# Patient Record
Sex: FEMALE | Race: WHITE | NOT HISPANIC OR LATINO | Employment: OTHER | URBAN - METROPOLITAN AREA
[De-identification: names, ages, dates, MRNs, and addresses within clinical notes are randomized per-mention and may not be internally consistent; named-entity substitution may affect disease eponyms.]

---

## 2017-09-26 ENCOUNTER — RECORDS - HEALTHEAST (OUTPATIENT)
Dept: LAB | Facility: CLINIC | Age: 37
End: 2017-09-26

## 2017-09-26 LAB — HIV 1+2 AB+HIV1 P24 AG SERPL QL IA: NEGATIVE

## 2018-11-05 ENCOUNTER — RECORDS - HEALTHEAST (OUTPATIENT)
Dept: LAB | Facility: CLINIC | Age: 38
End: 2018-11-05

## 2018-11-05 LAB — HIV 1+2 AB+HIV1 P24 AG SERPL QL IA: NEGATIVE

## 2018-11-06 LAB
HPV SOURCE: ABNORMAL
HUMAN PAPILLOMA VIRUS 16 DNA: NEGATIVE
HUMAN PAPILLOMA VIRUS 18 DNA: NEGATIVE
HUMAN PAPILLOMA VIRUS FINAL DIAGNOSIS: ABNORMAL
HUMAN PAPILLOMA VIRUS OTHER HR: POSITIVE
SPECIMEN DESCRIPTION: ABNORMAL

## 2018-11-14 LAB
BKR LAB AP ABNORMAL BLEEDING: NO
BKR LAB AP BIRTH CONTROL/HORMONES: NORMAL
BKR LAB AP CERVICAL APPEARANCE: NORMAL
BKR LAB AP GYN ADEQUACY: NORMAL
BKR LAB AP GYN INTERPRETATION: NORMAL
BKR LAB AP HPV REFLEX: NORMAL
BKR LAB AP LMP: NORMAL
BKR LAB AP PATIENT STATUS: NORMAL
BKR LAB AP PREVIOUS ABNORMAL: NORMAL
BKR LAB AP PREVIOUS NORMAL: 2015
HIGH RISK?: NO
PATH REPORT.COMMENTS IMP SPEC: NORMAL
RESULT FLAG (HE HISTORICAL CONVERSION): NORMAL

## 2019-11-27 LAB
HPV SOURCE: NORMAL
HUMAN PAPILLOMA VIRUS 16 DNA: NEGATIVE
HUMAN PAPILLOMA VIRUS 18 DNA: NEGATIVE
HUMAN PAPILLOMA VIRUS FINAL DIAGNOSIS: NORMAL
HUMAN PAPILLOMA VIRUS OTHER HR: NEGATIVE
SPECIMEN DESCRIPTION: NORMAL

## 2019-12-04 ENCOUNTER — RECORDS - HEALTHEAST (OUTPATIENT)
Dept: ADMINISTRATIVE | Facility: OTHER | Age: 39
End: 2019-12-04

## 2019-12-04 LAB
BKR LAB AP ABNORMAL BLEEDING: NO
BKR LAB AP BIRTH CONTROL/HORMONES: NORMAL
BKR LAB AP CERVICAL APPEARANCE: NORMAL
BKR LAB AP GYN ADEQUACY: NORMAL
BKR LAB AP GYN INTERPRETATION: NORMAL
BKR LAB AP HPV REFLEX: NORMAL
BKR LAB AP LMP: NORMAL
BKR LAB AP PATIENT STATUS: NO
BKR LAB AP PREVIOUS ABNORMAL: NORMAL
BKR LAB AP PREVIOUS NORMAL: NORMAL
HIGH RISK?: NO
PATH REPORT.COMMENTS IMP SPEC: NORMAL
RESULT FLAG (HE HISTORICAL CONVERSION): NORMAL

## 2020-11-23 ENCOUNTER — RECORDS - HEALTHEAST (OUTPATIENT)
Dept: ADMINISTRATIVE | Facility: OTHER | Age: 40
End: 2020-11-23

## 2020-11-27 ENCOUNTER — RECORDS - HEALTHEAST (OUTPATIENT)
Dept: ADMINISTRATIVE | Facility: OTHER | Age: 40
End: 2020-11-27

## 2020-11-27 ENCOUNTER — RECORDS - HEALTHEAST (OUTPATIENT)
Dept: LAB | Facility: CLINIC | Age: 40
End: 2020-11-27

## 2020-11-27 LAB
ALBUMIN SERPL-MCNC: 4.3 G/DL (ref 3.5–5)
ALP SERPL-CCNC: 56 U/L (ref 45–120)
ALT SERPL W P-5'-P-CCNC: 19 U/L (ref 0–45)
ANION GAP SERPL CALCULATED.3IONS-SCNC: 11 MMOL/L (ref 5–18)
AST SERPL W P-5'-P-CCNC: 16 U/L (ref 0–40)
BILIRUB SERPL-MCNC: 0.4 MG/DL (ref 0–1)
BUN SERPL-MCNC: 21 MG/DL (ref 8–22)
CALCIUM SERPL-MCNC: 8.9 MG/DL (ref 8.5–10.5)
CHLORIDE BLD-SCNC: 105 MMOL/L (ref 98–107)
CHOLEST SERPL-MCNC: 173 MG/DL
CO2 SERPL-SCNC: 23 MMOL/L (ref 22–31)
CREAT SERPL-MCNC: 0.71 MG/DL (ref 0.6–1.1)
FASTING STATUS PATIENT QL REPORTED: NORMAL
GFR SERPL CREATININE-BSD FRML MDRD: >60 ML/MIN/1.73M2
GLUCOSE BLD-MCNC: 101 MG/DL (ref 70–125)
HDLC SERPL-MCNC: 75 MG/DL
HIV 1+2 AB+HIV1 P24 AG SERPL QL IA: NEGATIVE
LDLC SERPL CALC-MCNC: 81 MG/DL
POTASSIUM BLD-SCNC: 3.9 MMOL/L (ref 3.5–5)
PROT SERPL-MCNC: 7.4 G/DL (ref 6–8)
SODIUM SERPL-SCNC: 139 MMOL/L (ref 136–145)
TRIGL SERPL-MCNC: 85 MG/DL

## 2020-11-28 LAB — T PALLIDUM AB SER QL: NEGATIVE

## 2020-12-01 LAB
C TRACH DNA SPEC QL PROBE+SIG AMP: NEGATIVE
N GONORRHOEA DNA SPEC QL NAA+PROBE: NEGATIVE

## 2020-12-03 ENCOUNTER — RECORDS - HEALTHEAST (OUTPATIENT)
Dept: ADMINISTRATIVE | Facility: OTHER | Age: 40
End: 2020-12-03

## 2021-01-29 ENCOUNTER — RECORDS - HEALTHEAST (OUTPATIENT)
Dept: LAB | Facility: CLINIC | Age: 41
End: 2021-01-29

## 2021-01-29 ENCOUNTER — RECORDS - HEALTHEAST (OUTPATIENT)
Dept: ADMINISTRATIVE | Facility: OTHER | Age: 41
End: 2021-01-29

## 2021-01-29 LAB — HIV 1+2 AB+HIV1 P24 AG SERPL QL IA: NEGATIVE

## 2021-01-30 LAB — T PALLIDUM AB SER QL: NEGATIVE

## 2021-02-02 LAB
C TRACH DNA SPEC QL PROBE+SIG AMP: NEGATIVE
N GONORRHOEA DNA SPEC QL NAA+PROBE: NEGATIVE

## 2021-05-25 ENCOUNTER — RECORDS - HEALTHEAST (OUTPATIENT)
Dept: RADIOLOGY | Facility: CLINIC | Age: 41
End: 2021-05-25

## 2021-05-25 ENCOUNTER — RECORDS - HEALTHEAST (OUTPATIENT)
Dept: ADMINISTRATIVE | Facility: OTHER | Age: 41
End: 2021-05-25

## 2021-05-25 ENCOUNTER — AMBULATORY - HEALTHEAST (OUTPATIENT)
Dept: ADMINISTRATIVE | Facility: CLINIC | Age: 41
End: 2021-05-25
Payer: COMMERCIAL

## 2021-05-25 DIAGNOSIS — C50.919 INFILTRATING DUCT CARCINOMA (H): ICD-10-CM

## 2021-05-28 ENCOUNTER — HOSPITAL ENCOUNTER (OUTPATIENT)
Dept: SURGERY | Facility: CLINIC | Age: 41
Discharge: HOME | End: 2021-05-28
Attending: SPECIALIST
Payer: COMMERCIAL

## 2021-05-28 ENCOUNTER — AMBULATORY - HEALTHEAST (OUTPATIENT)
Dept: SURGERY | Facility: AMBULATORY SURGERY CENTER | Age: 41
End: 2021-05-28

## 2021-05-28 DIAGNOSIS — Z11.59 ENCOUNTER FOR SCREENING FOR OTHER VIRAL DISEASES: ICD-10-CM

## 2021-05-28 DIAGNOSIS — F12.11 MILD CANNABIS ABUSE IN SUSTAINED REMISSION IN CONTROLLED ENVIRONMENT: ICD-10-CM

## 2021-05-28 DIAGNOSIS — D22.5 PIGMENTED HAIRY EPIDERMAL NEVUS: ICD-10-CM

## 2021-05-28 DIAGNOSIS — C50.112 MALIGNANT NEOPLASM OF CENTRAL PORTION OF LEFT BREAST IN FEMALE, ESTROGEN RECEPTOR POSITIVE (H): ICD-10-CM

## 2021-05-28 DIAGNOSIS — Z17.0 MALIGNANT NEOPLASM OF CENTRAL PORTION OF LEFT BREAST IN FEMALE, ESTROGEN RECEPTOR POSITIVE (H): ICD-10-CM

## 2021-05-28 ASSESSMENT — MIFFLIN-ST. JEOR: SCORE: 1359.88

## 2021-06-01 ENCOUNTER — COMMUNICATION - HEALTHEAST (OUTPATIENT)
Dept: SURGERY | Facility: CLINIC | Age: 41
End: 2021-06-01

## 2021-06-07 ENCOUNTER — COMMUNICATION - HEALTHEAST (OUTPATIENT)
Dept: TELEHEALTH | Facility: CLINIC | Age: 41
End: 2021-06-07

## 2021-06-07 ENCOUNTER — AMBULATORY - HEALTHEAST (OUTPATIENT)
Dept: LAB | Facility: CLINIC | Age: 41
End: 2021-06-07

## 2021-06-07 DIAGNOSIS — Z11.59 ENCOUNTER FOR SCREENING FOR OTHER VIRAL DISEASES: ICD-10-CM

## 2021-06-07 LAB
SARS-COV-2 PCR COMMENT: NORMAL
SARS-COV-2 RNA SPEC QL NAA+PROBE: NEGATIVE
SARS-COV-2 VIRUS SPECIMEN SOURCE: NORMAL

## 2021-06-08 ENCOUNTER — ANESTHESIA - HEALTHEAST (OUTPATIENT)
Dept: SURGERY | Facility: AMBULATORY SURGERY CENTER | Age: 41
End: 2021-06-08

## 2021-06-08 ENCOUNTER — COMMUNICATION - HEALTHEAST (OUTPATIENT)
Dept: SCHEDULING | Facility: CLINIC | Age: 41
End: 2021-06-08

## 2021-06-08 ENCOUNTER — RECORDS - HEALTHEAST (OUTPATIENT)
Dept: ADMINISTRATIVE | Facility: OTHER | Age: 41
End: 2021-06-08

## 2021-06-08 ASSESSMENT — MIFFLIN-ST. JEOR: SCORE: 1359.42

## 2021-06-09 ENCOUNTER — RECORDS - HEALTHEAST (OUTPATIENT)
Dept: ADMINISTRATIVE | Facility: OTHER | Age: 41
End: 2021-06-09

## 2021-06-09 ENCOUNTER — HOSPITAL ENCOUNTER (OUTPATIENT)
Dept: MAMMOGRAPHY | Facility: CLINIC | Age: 41
Discharge: HOME | End: 2021-06-09
Attending: SPECIALIST
Payer: COMMERCIAL

## 2021-06-09 ENCOUNTER — SURGERY - HEALTHEAST (OUTPATIENT)
Dept: SURGERY | Facility: AMBULATORY SURGERY CENTER | Age: 41
End: 2021-06-09
Payer: COMMERCIAL

## 2021-06-09 ENCOUNTER — HOSPITAL ENCOUNTER (OUTPATIENT)
Dept: NUCLEAR MEDICINE | Facility: HOSPITAL | Age: 41
Discharge: HOME | End: 2021-06-09
Attending: SPECIALIST
Payer: COMMERCIAL

## 2021-06-09 DIAGNOSIS — C50.112 MALIGNANT NEOPLASM OF CENTRAL PORTION OF LEFT BREAST IN FEMALE, ESTROGEN RECEPTOR POSITIVE (H): ICD-10-CM

## 2021-06-09 DIAGNOSIS — Z17.0 MALIGNANT NEOPLASM OF CENTRAL PORTION OF LEFT BREAST IN FEMALE, ESTROGEN RECEPTOR POSITIVE (H): ICD-10-CM

## 2021-06-09 ASSESSMENT — MIFFLIN-ST. JEOR: SCORE: 1359.42

## 2021-06-10 ENCOUNTER — OFFICE VISIT - HEALTHEAST (OUTPATIENT)
Dept: ONCOLOGY | Facility: CLINIC | Age: 41
End: 2021-06-10

## 2021-06-10 ENCOUNTER — RECORDS - HEALTHEAST (OUTPATIENT)
Dept: ADMINISTRATIVE | Facility: OTHER | Age: 41
End: 2021-06-10

## 2021-06-10 DIAGNOSIS — Z80.0 FAMILY HISTORY OF PANCREATIC CANCER: ICD-10-CM

## 2021-06-10 DIAGNOSIS — Z80.3 FAMILY HISTORY OF MALIGNANT NEOPLASM OF BREAST: ICD-10-CM

## 2021-06-10 DIAGNOSIS — Z17.0 MALIGNANT NEOPLASM OF CENTRAL PORTION OF LEFT BREAST IN FEMALE, ESTROGEN RECEPTOR POSITIVE (H): ICD-10-CM

## 2021-06-10 DIAGNOSIS — C50.112 MALIGNANT NEOPLASM OF CENTRAL PORTION OF LEFT BREAST IN FEMALE, ESTROGEN RECEPTOR POSITIVE (H): ICD-10-CM

## 2021-06-10 DIAGNOSIS — Z71.83 ENCOUNTER FOR NONPROCREATIVE GENETIC COUNSELING: ICD-10-CM

## 2021-06-10 DIAGNOSIS — Z80.0 FAMILY HISTORY OF COLON CANCER: ICD-10-CM

## 2021-06-11 ENCOUNTER — AMBULATORY - HEALTHEAST (OUTPATIENT)
Dept: SURGERY | Facility: CLINIC | Age: 41
End: 2021-06-11

## 2021-06-11 ENCOUNTER — COMMUNICATION - HEALTHEAST (OUTPATIENT)
Dept: ADMINISTRATIVE | Facility: HOSPITAL | Age: 41
End: 2021-06-11

## 2021-06-14 ENCOUNTER — SURGERY - HEALTHEAST (OUTPATIENT)
Dept: SURGERY | Facility: AMBULATORY SURGERY CENTER | Age: 41
End: 2021-06-14
Payer: COMMERCIAL

## 2021-06-14 ENCOUNTER — ANESTHESIA - HEALTHEAST (OUTPATIENT)
Dept: SURGERY | Facility: AMBULATORY SURGERY CENTER | Age: 41
End: 2021-06-14

## 2021-06-14 ENCOUNTER — RECORDS - HEALTHEAST (OUTPATIENT)
Dept: ADMINISTRATIVE | Facility: OTHER | Age: 41
End: 2021-06-14

## 2021-06-14 ENCOUNTER — HOSPITAL ENCOUNTER (EMERGENCY)
Dept: EMERGENCY MEDICINE | Facility: CLINIC | Age: 41
Discharge: HOME | End: 2021-06-14
Attending: EMERGENCY MEDICINE
Payer: COMMERCIAL

## 2021-06-14 ENCOUNTER — AMBULATORY - HEALTHEAST (OUTPATIENT)
Dept: INFUSION THERAPY | Facility: CLINIC | Age: 41
End: 2021-06-14

## 2021-06-14 ENCOUNTER — COMMUNICATION - HEALTHEAST (OUTPATIENT)
Dept: SURGERY | Facility: CLINIC | Age: 41
End: 2021-06-14

## 2021-06-14 ENCOUNTER — HOSPITAL ENCOUNTER (OUTPATIENT)
Dept: SURGERY | Facility: CLINIC | Age: 41
Discharge: HOME | End: 2021-06-14
Attending: SPECIALIST
Payer: COMMERCIAL

## 2021-06-14 DIAGNOSIS — C50.112 MALIGNANT NEOPLASM OF CENTRAL PORTION OF LEFT BREAST IN FEMALE, ESTROGEN RECEPTOR POSITIVE (H): ICD-10-CM

## 2021-06-14 DIAGNOSIS — Z17.0 MALIGNANT NEOPLASM OF CENTRAL PORTION OF LEFT BREAST IN FEMALE, ESTROGEN RECEPTOR POSITIVE (H): ICD-10-CM

## 2021-06-14 DIAGNOSIS — M96.840 POSTOPERATIVE HEMATOMA OF MUSCULOSKELETAL STRUCTURE FOLLOWING MUSCULOSKELETAL PROCEDURE: ICD-10-CM

## 2021-06-14 LAB
ATRIAL RATE - MUSE: 83 BPM
DIASTOLIC BLOOD PRESSURE - MUSE: NORMAL
ERYTHROCYTE [DISTWIDTH] IN BLOOD BY AUTOMATED COUNT: 12.4 % (ref 11–14.5)
HCT VFR BLD AUTO: 39.5 % (ref 35–47)
HGB BLD-MCNC: 13.4 G/DL (ref 12–16)
INTERPRETATION ECG - MUSE: NORMAL
MCH RBC QN AUTO: 30.7 PG (ref 27–34)
MCHC RBC AUTO-ENTMCNC: 33.9 G/DL (ref 32–36)
MCV RBC AUTO: 91 FL (ref 80–100)
P AXIS - MUSE: 48 DEGREES
PLATELET # BLD AUTO: 244 THOU/UL (ref 140–440)
PMV BLD AUTO: 10 FL (ref 8.5–12.5)
PR INTERVAL - MUSE: 128 MS
QRS DURATION - MUSE: 84 MS
QT - MUSE: 388 MS
QTC - MUSE: 455 MS
R AXIS - MUSE: 59 DEGREES
RBC # BLD AUTO: 4.36 MILL/UL (ref 3.8–5.4)
SYSTOLIC BLOOD PRESSURE - MUSE: NORMAL
T AXIS - MUSE: 27 DEGREES
VENTRICULAR RATE- MUSE: 83 BPM
WBC: 4.1 THOU/UL (ref 4–11)

## 2021-06-14 ASSESSMENT — MIFFLIN-ST. JEOR
SCORE: 1364.42
SCORE: 1359.42

## 2021-06-17 ENCOUNTER — AMBULATORY - HEALTHEAST (OUTPATIENT)
Dept: INFUSION THERAPY | Facility: CLINIC | Age: 41
End: 2021-06-17

## 2021-06-17 ENCOUNTER — TRANSFERRED RECORDS (OUTPATIENT)
Dept: HEALTH INFORMATION MANAGEMENT | Facility: CLINIC | Age: 41
End: 2021-06-17

## 2021-06-17 ENCOUNTER — COMMUNICATION - HEALTHEAST (OUTPATIENT)
Dept: SURGERY | Facility: CLINIC | Age: 41
End: 2021-06-17
Payer: COMMERCIAL

## 2021-06-17 ENCOUNTER — HOSPITAL ENCOUNTER (OUTPATIENT)
Dept: SURGERY | Facility: CLINIC | Age: 41
Discharge: HOME | End: 2021-06-17
Attending: SPECIALIST
Payer: COMMERCIAL

## 2021-06-17 DIAGNOSIS — Z80.0 FAMILY HISTORY OF PANCREATIC CANCER: ICD-10-CM

## 2021-06-17 DIAGNOSIS — Z80.0 FAMILY HISTORY OF COLON CANCER: ICD-10-CM

## 2021-06-17 DIAGNOSIS — Z48.89 POSTOPERATIVE VISIT: ICD-10-CM

## 2021-06-17 DIAGNOSIS — C50.112 MALIGNANT NEOPLASM OF CENTRAL PORTION OF LEFT BREAST IN FEMALE, ESTROGEN RECEPTOR POSITIVE (H): ICD-10-CM

## 2021-06-17 DIAGNOSIS — Z17.0 MALIGNANT NEOPLASM OF CENTRAL PORTION OF LEFT BREAST IN FEMALE, ESTROGEN RECEPTOR POSITIVE (H): ICD-10-CM

## 2021-06-17 DIAGNOSIS — Z80.3 FAMILY HISTORY OF MALIGNANT NEOPLASM OF BREAST: ICD-10-CM

## 2021-06-18 ENCOUNTER — COMMUNICATION - HEALTHEAST (OUTPATIENT)
Dept: ONCOLOGY | Facility: HOSPITAL | Age: 41
End: 2021-06-18

## 2021-06-19 ENCOUNTER — COMMUNICATION - HEALTHEAST (OUTPATIENT)
Dept: SURGERY | Facility: CLINIC | Age: 41
End: 2021-06-19
Payer: COMMERCIAL

## 2021-06-21 ENCOUNTER — COMMUNICATION - HEALTHEAST (OUTPATIENT)
Dept: SURGERY | Facility: CLINIC | Age: 41
End: 2021-06-21
Payer: COMMERCIAL

## 2021-06-21 ENCOUNTER — RECORDS - HEALTHEAST (OUTPATIENT)
Dept: ADMINISTRATIVE | Facility: OTHER | Age: 41
End: 2021-06-21

## 2021-06-22 ENCOUNTER — COMMUNICATION - HEALTHEAST (OUTPATIENT)
Dept: SURGERY | Facility: CLINIC | Age: 41
End: 2021-06-22
Payer: COMMERCIAL

## 2021-06-24 ENCOUNTER — COMMUNICATION - HEALTHEAST (OUTPATIENT)
Dept: SURGERY | Facility: CLINIC | Age: 41
End: 2021-06-24

## 2021-06-25 NOTE — TELEPHONE ENCOUNTER
Patient returned call to schedule genetics labs.  Scheduled on 6/14/21 at 9:15 at [facility].

## 2021-06-25 NOTE — PROGRESS NOTES
"Antonella presents to [facility] today for a surgical consult with Dr. Ramirez  regarding her newly diagnosed left breast cancer.  She is accompanied by her sister in law for consult.  Patient states she has a 7 year old daughter.  Talked with her about resources for young survivors as well as resources for her daughter.  RN assessment and EMR update. Resp 16   Ht 5' 9\" (1.753 m)   Wt 139 lb (63 kg)   Breastfeeding No   BMI 20.53 kg/m    Patient given a Breast Cancer Packet, contents reviewed.  She met with Dr. Ramirez  see dictation for details of visit. She will plan wire loc lumpectomy with sentinel node biopsy .  Pre and post op teaching complete.   Lili to call her for surgery scheduling.  Support provided, invited calls.  RN time 22 mins.  "

## 2021-06-25 NOTE — TELEPHONE ENCOUNTER
Patient called, has a history of a hairy nevus as a child that was removed from her check when she was little.  I added this to her medical history list and will update Dr. Ramirez.

## 2021-06-25 NOTE — PROGRESS NOTES
"This is a 40 y.o.  female who I'm asked to see by Rosemary Mata MD for evaluation of a left breast cancer.  This was picked up  on screening mammogram.  The patient cannot feel a mass.  A needle biopsy was done which shows an invasive ductal carcinoma.  It is estrogen receptor positive, progesterone receptor positive and HER-2 negative.    She has a significant family history and that her aunt had breast cancer and her mother  of pancreatic cancer.      PAST MEDICAL HISTORY:  Patient is otherwise healthy.  Medications:  None    Allergies:  Allergies   Allergen Reactions     Cat Dander      Dog Dander        Social History:  Patient does not smoke        Physical Exam  Resp 16   Ht 5' 9\" (1.753 m)   Wt 139 lb (63 kg)   Breastfeeding No   BMI 20.53 kg/m    General:alert, appears stated age and cooperative  Lungs:clear to auscultation bilaterally  CV:regular rate and rhythm, S1, S2 normal, no murmur, click, rub or gallop  Breasts: Small palpable mass in the somewhat superior lateral but central left breast.  Measures no more than about 1 to 1-1/2 cm in size.  No skin changes.  Lymph Nodes:no axillary nodes palpated  Neuro:Grossly normal  Musculoskeletal: Normal range of motion of her upper extremities.  Skin: Normal skin turgor.    Reviewed her mammograms and ultrasound and pathology.     Impression: Left Breast Cancer. Clinically T1, N0.  Discussed the surgical options for treatment of breast cancer which generally are a lumpectomy (partial mastectomy) combined with radiation versus a mastectomy.  Explained that the survival benefit is the same for both.  The difference is in local recurrence risk.  The patient is a Excellent candidate for a lumpectomy.  The only thing that gives me any pause at all is her family history.  However, this is a low-grade tumor so it does not fit that this would be a genetic type cancer.  I think she should get genetic testing but I think we can proceed with a  lumpectomy if " that is what she prefers to do.  Discussed SLN biopsy.  The procedure and rationale were explained.  Discussed that at this point we do not know yet whether or not she will need chemotherapy and we may not know until we get all of the results of surgery back.      Plan: We'll schedule for a left  lumpectomy with sentinel lymph node biopsy.  This is typically an outpatient procedure under local MAC anesthetic.  The risks and benefits of surgery were explained.  Also talked about expected recovery time.  I have also put in a referral to genetic counseling.        55 minutes spent on the date of the encounter doing chart review, history and exam, documentation and further activities per the note

## 2021-06-25 NOTE — ED TRIAGE NOTES
Left breast lumpectomy last Wednesday.  Here at cancer [facility] for a blood draw today and began having left chest pain and swelling at incision site. Denies nausea, vomiting or shortness of breath. Did have a headache this am and took ibuprofen this morning. GCS 15

## 2021-06-25 NOTE — TELEPHONE ENCOUNTER
Antonella called, was in the ER this am after developing a sudden onset hematoma.  Said she was just waiting to have her blood drawn for genetic testing and she suddenly felt increased pain, swelling and a lump forming in her breast.  She was stable in the ER and is now home. States she is laying down. Feels that her hematoma is stable, no increased pain or swelling for now. Was given Percocet in the ER and that is helping. Said the size of the hematoma is larger than a plum but smaller than an orange.   Told her to keep pressure and an ice pack on her breast and I will connect with Dr. Ramirez for a plan.      Called patient to tell her Dr. Ramirez wanted to  her appointment up to tomorrow, but patient states she is with her nurse friend and her hematoma seems to be increasing again. She is getting swelling up towards her clavicle.  Dr. Ramirez notified again. Wants patient to come to office for eval right now.  Patient said her friend can drive her.  Once again, told her to keep pressure over the breast and use her icepack.

## 2021-06-26 ENCOUNTER — HEALTH MAINTENANCE LETTER (OUTPATIENT)
Age: 41
End: 2021-06-26

## 2021-06-26 NOTE — ANESTHESIA CARE TRANSFER NOTE
Last vitals:   Vitals:    06/14/21 1521   BP: 105/65   Pulse: 85   Resp: 16   Temp: 36.2  C (97.2  F)   SpO2: 98%     Pt brought to phase 2 on room air. Monitors applied. VSS.    Patient's level of consciousness is drowsy  Spontaneous respirations: yes  Maintains airway independently: yes  Dentition unchanged: yes  Oropharynx: oropharynx clear of all foreign objects    QCDR Measures:  ASA# 20 - Surgical Safety Checklist: WHO surgical safety checklist completed prior to induction    PQRS# 430 - Adult PONV Prevention: 4558F - Pt received => 2 anti-emetic agents (different classes) preop & intraop  ASA# 8 - Peds PONV Prevention: NA - Not pediatric patient, not GA or 2 or more risk factors NOT present  PQRS# 424 - Florecita-op Temp Management: NA - MAC anesthesia or case < 60 minutes  PQRS# 426 - PACU Transfer Protocol: - Transfer of care checklist used  ASA# 14 - Acute Post-op Pain: ASA14B - Patient did NOT experience pain >= 7 out of 10

## 2021-06-26 NOTE — PROGRESS NOTES
Antonella presents to [facility] for a post op appointment with Dr. Ramirez .  She is accompanied by her friend for appointment.  She states she is doing well, minimal pain.  She has good ROM, reviewed ROM exercises with her.  Patient met with Dr. Ramirez .  See dictation for details of visit.  Oncotype has been ordered and results are pending.  She will plan to be referred onto Medical Oncology and Radiation Oncology and will plan to follow up with Dr. Ramirez  in one year with bilateral mammograms.  Invited calls sooner if she has any questions. Support provided.  RN time 15 mins.

## 2021-06-26 NOTE — ANESTHESIA PREPROCEDURE EVALUATION
Anesthesia Evaluation      Patient summary reviewed     Airway   Mallampati: II  Neck ROM: full   Pulmonary - negative ROS and normal exam                          Cardiovascular - negative ROS and normal exam   Neuro/Psych - negative ROS     Endo/Other - negative ROS      GI/Hepatic/Renal            Dental - normal exam                          Anesthesia Plan  Planned anesthetic: MAC    ASA 1   Induction: intravenous   Anesthetic plan and risks discussed with: patient    Post-op plan: routine recovery

## 2021-06-26 NOTE — ED PROVIDER NOTES
10:05 AM.  This patient was seen by the physician's assistant, Sabra Aparicio.  She discussed the patient's history and physical examination, plan evaluation, and disposition and possible treatment.  I also saw the patient performed history physical examination myself.  Patient is status post left breast lumpectomy and axillary node dissection.  She notes increased swelling in the area of the breast incision consistent with possible swelling or hematoma.  This has the appearance of a possible hematoma near.  We will check a CBC and chest x-ray to start with.  Patient may need further evaluation after discussion with the surgeon, Dr. Sigrid Ramirez.     Sohail Huggins MD  06/14/21 8848

## 2021-06-26 NOTE — ED PROVIDER NOTES
"EMERGENCY DEPARTMENT ENCOUNTER      NAME: Antonella Jamison  AGE: 40 y.o. female  YOB: 1980  MRN: [id number]  EVALUATION DATE & TIME: 6/14/2021  9:41 AM    PCP: Rosemary Mata MD    ED PROVIDER: Sabra Aparicio PA-C      Chief Complaint   Patient presents with     Chest Pain         FINAL IMPRESSION:  1. Postoperative hematoma of musculoskeletal structure following musculoskeletal procedure          MEDICAL DECISION MAKING:    Pertinent Labs & Imaging studies reviewed. (See chart for details)  40 y.o. female with a h/o ER+ br CA with recent left breast lumpectomy and sentinel node biopsy 5 days ago by Dr. Ramirez presents to the Emergency Department for evaluation of left chest pain with area of swelling     On exam she is alert, uncomfortable appearing but no acute distress.  Vital signs are WNL.  The area of swelling overlies her surgical incision which is tender to palpation and fluctuant no surrounding erythema, warmth. Diameter of swelling measures approximately 4 cm across. The surgical incision is well-appearing without any active drainage.    Area appears to be postsurgical hematoma, as there is no sign of infection to suggest cutaneous abscess or surgical site compromise. Patient feels increased pressure with leaning forward, and notes that the area seems that it is increasing over the last couple hours.  CBC does not reveal anemia or leukocytosis. CXR without effusion, infiltrates or pneumothorax.    Dr. Haney team at the breast center was contacted, I spoke to Dr. Cain who recommends pain control and follow-up in the breast clinic as scheduled in 3 days for consideration of hematoma drainage.  Recommended she refrain from leaning forward or any motion that exacerbates the pain.  We had a long discussion regarding the nature of the swelling, and reassured her that it is not dangerous and that there is nothing to \"pop\" as was her fear.      She is concerned that she will be in " significant pain for 3 days until her follow-up in the breast clinic, I encouraged her to call their triage line for possibility of being seen sooner.  I offered to provide more oral pain medication, patient received never took her Vicodin after her recent procedure and she does have all of this at home.  I did give a Percocet during her ED course which helped to relieve some of the pain.    There is no evidence of acute or emergent process requiring intervention at this time. Pt is appropriate for outpatient management. Provisional nature of today's diagnosis was discussed and strict return precautions were given. Pt expressed understanding and She was discharged to home in good condition.     0 minutes of critical care time     ED COURSE  10:00 AM  Met and evaluated patient. Discussed ED plan.   10:24 AM  Staffed the patient with Dr. Huggins  10:28 AM Spoke with Dr. Key  11:00 AM Updated the patient.  11:20 AM discharged to home in good condition by RN.      MEDICATIONS GIVEN IN THE EMERGENCY:  Medications   oxyCODONE-acetaminophen 5-325 mg 1 tablet (PERCOCET/ENDOCET) (1 tablet Oral Given 6/14/21 1055)       NEW PRESCRIPTIONS STARTED AT TODAY'S ER VISIT  Discharge Medication List as of 6/14/2021 11:20 AM             =================================================================    HPI    Patient information was obtained from: Patient    Use of Intrepreter: N/A         Antonella Jamison is a 40 y.o. female with past medical history of breast cancer, who presents to the emergency department for evaluation of left sided breast pain.    Per chart review, patient completed Left Lumpectomy (With MSC u/s): [place] Lymph Node Biopsy surgery last Wednesday (6/9/21) with Dr. Ramirez at [facility].     Patient reports her left breast has felt fine his her surgery last Wednesday, but beginning this morning she felt sudden, constant pain near her incision site and removed the tape. Upon removing tape, she reports  "she felt a \"hard ball forming.\" Prior to sudden onset of pain, patient reports normal breast tissue on both breasts. She reports 30 minutes prior to ED arrival, \"ball\" in her left breast felt like it was increasing in size while she was in the waiting room at the cancer center so she came to the ED.     Patient reports taking ibuprofen for pain this morning for her headache, which she mentions is common for her. She denies nausea. Vomiting, abdominal pain, fever, and any other recent illness. She has no other complaints at this time.    REVIEW OF SYSTEMS   Review of Systems   Constitutional: Negative for activity change, fatigue, fever and unexpected weight change.   HENT: Negative for congestion, sore throat and voice change.    Eyes: Negative for visual disturbance.   Respiratory: Negative for cough, chest tightness, shortness of breath and wheezing.    Cardiovascular: Positive for chest pain (left breast). Negative for palpitations and leg swelling.   Gastrointestinal: Negative for abdominal pain, blood in stool, constipation, diarrhea, nausea and vomiting.   Genitourinary: Negative for difficulty urinating, dysuria and hematuria.   Musculoskeletal: Negative for arthralgias and joint swelling.   Skin: Negative for pallor and rash.   Neurological: Negative for dizziness, tremors, weakness, light-headedness and headaches.   Hematological: Negative for adenopathy.   Psychiatric/Behavioral: Negative for agitation and confusion. The patient is not nervous/anxious.    All other systems reviewed and are negative.        PAST MEDICAL HISTORY:  Past Medical History:   Diagnosis Date     Cancer (H)      Infectious viral hepatitis        PAST SURGICAL HISTORY:  Past Surgical History:   Procedure Laterality Date     Hairy nevis       TX MASTECTOMY, PARTIAL Left 6/9/2021    Procedure: Left Lumpectomy (With MSC u/s): [place] Lymph Node Biopsy;  Surgeon: Sigrid Ramirez MD;  Location: [facility];  Service: General     " US SENTINEL NODE INJECTION  2021     VAGINAL DELIVERY       WISDOM TOOTH EXTRACTION             CURRENT MEDICATIONS:    No current facility-administered medications on file prior to encounter.      Current Outpatient Medications on File Prior to Encounter   Medication Sig     HYDROcodone-acetaminophen 5-325 mg per tablet Take 1 tablet by mouth every 4 (four) hours as needed for pain.     loratadine (CLARITIN) 10 mg tablet Take 10 mg by mouth.       ALLERGIES:  Allergies   Allergen Reactions     Cat Dander      Dog Dander        FAMILY HISTORY:  History reviewed. No pertinent family history.    SOCIAL HISTORY:   Social History     Socioeconomic History     Marital status:      Spouse name: Not on file     Number of children: Not on file     Years of education: Not on file     Highest education level: Not on file   Occupational History     Not on file   Social Needs     Financial resource strain: Not on file     Food insecurity     Worry: Not on file     Inability: Not on file     Transportation needs     Medical: Not on file     Non-medical: Not on file   Tobacco Use     Smoking status: Former Smoker     Quit date:      Years since quittin.4     Smokeless tobacco: Never Used   Substance and Sexual Activity     Alcohol use: Yes     Comment: 5 X week     Drug use: Never     Sexual activity: Not on file   Lifestyle     Physical activity     Days per week: Not on file     Minutes per session: Not on file     Stress: Not on file   Relationships     Social connections     Talks on phone: Not on file     Gets together: Not on file     Attends Nondenominational service: Not on file     Active member of club or organization: Not on file     Attends meetings of clubs or organizations: Not on file     Relationship status: Not on file     Intimate partner violence     Fear of current or ex partner: Not on file     Emotionally abused: Not on file     Physically abused: Not on file     Forced sexual activity: Not on  "file   Other Topics Concern     Not on file   Social History Narrative     Not on file         VITALS:  Patient Vitals for the past 24 hrs:   BP Temp Temp src Pulse Resp SpO2 Height Weight   06/14/21 0948 131/64 98.5  F (36.9  C) Oral 84 20 97 % 5' 9\" (1.753 m) 140 lb (63.5 kg)       PHYSICAL EXAM      Physical Exam   Constitutional: She is oriented to person, place, and time. She appears well-developed and well-nourished. No distress.   Uncomfortable appearing. Thin.    HENT:   Head: Normocephalic and atraumatic.   Eyes: Conjunctivae are normal. No scleral icterus.   Neck: Normal range of motion.   Cardiovascular: Normal rhythm.   No murmur heard.  Pulmonary/Chest: Effort normal and breath sounds normal. No respiratory distress. She has no wheezes.   Abdominal: Soft. She exhibits no distension. There is no abdominal tenderness. There is no rebound.   Musculoskeletal: Normal range of motion.         General: No tenderness, deformity or edema.      Comments: Upper outer quadrant of left breast area of swelling, induration about surgical incident.    Neurological: She is alert and oriented to person, place, and time. No cranial nerve deficit.   Skin: Skin is warm and dry. No rash noted. She is not diaphoretic. No erythema.   Psychiatric: She has a normal mood and affect. Her behavior is normal.   Vitals reviewed.      LAB:  All pertinent labs reviewed and interpreted.    Labs Reviewed   HM2(CBC W/O DIFFERENTIAL) - Normal       Result Value    WBC 4.1      RBC 4.36      Hemoglobin 13.4      Hematocrit 39.5      MCV 91      MCH 30.7      MCHC 33.9      RDW 12.4      Platelets 244      MPV 10.0     RAINBOW DRAW    Narrative:     The following orders were created for panel order [facility] Draw.  Procedure                               Abnormality         Status                     ---------                               -----------         ------                     Light Green Top[[id number]]                                 "  Final result                 Please view results for these tests on the individual orders.   GREEN TOP(LI HEP 4ML)         RADIOLOGY:  Reviewed all pertinent imaging. Please see official radiology report    XR Chest 2 Views   Final Result   Negative chest.             EKG:    Performed at: 09:33  Impression: normal sinus  Dr. Juan Huggins and I have independently reviewed and interpreted the EKG(s) documented above.    PROCEDURES:   None      I, Rachelle Bset, am serving as a scribe to document services personally performed by Sabra Aparicio PA-C based on my observation and the provider's statements to me. I, Sabra Aparicio PA-C attest that Rachelle Best is acting in a scribe capacity, has observed my performance of the services and has documented them in accordance with my direction.      Sabra Aparicio PA-C  Emergency Medicine  [facility] EMERGENCY ROOM  [address] [place] [address]  Dept: [phone]  Loc: [phone]       Sabra Aparicio PA-C  06/15/21 0758

## 2021-06-26 NOTE — PROGRESS NOTES
"6/10/2021     Antonella Jamison is a 40 y.o. female who is being evaluated via a billable telephone visit.      The patient has been notified of following:     \"This telephone visit will be conducted via a call between you and your physician/provider. We have found that certain health care needs can be provided without the need for a physical exam.  This service lets us provide the care you need with a short phone conversation.  If a prescription is necessary we can send it directly to your pharmacy.  If lab work is needed we can place an order for that and you can then stop by our lab to have the test done at a later time.    Telephone visits are billed at different rates depending on your insurance coverage. During this emergency period, for some insurers they may be billed the same as an in-person visit.  Please reach out to your insurance provider with any questions.    If during the course of the call the physician/provider feels a telephone visit is not appropriate, you will not be charged for this service.\"    Patient has given verbal consent to a Telephone visit? Yes    What phone number would you like to be contacted at? [phone]    Patient would like to receive their AVS by AVS Preference: Mail a copy.    Referring Provider: Sigrid Ramirez MD    Present for Today's Visit: Antonella    Presenting Information:   I met with Antonella Jamison today for genetic counseling (telephone visit due to covid19) to discuss her personal and family history of breast and gastrointestinal cancer.  She is here today to review this history, cancer screening recommendations, and available genetic testing options.    Personal History:  Antonella is a 40 y.o. female. She was recently diagnosed with a left breast cancer picked up on screening mammogram. Biopsy completed 5/19/2021 revealed an invasive ductal carcinoma with associated DCIS; ER positive, DC positive, and HER2 negative. She underwent a left lumpectomy on 6/9/2021 and has " follow-up scheduled with Dr. Harvey scheduled for 6/17/2021 to review final pathology and next steps.     She reports that she has a history of a precancerous hairy nevus removed from her right cheek when she was a child (<10 years).       She had her first menstrual period at age 11 or 12, her first child at age 33, and is premenopausal.  Antonella has her ovaries, fallopian tubes and uterus in place, and she has had no ovarian cancer screening to date.  She reports past history of oral contraceptive use on and off from approximately ages 17 to 31. and that she has never been on hormone replacement therapy.      Her most recent OB-GYN exam and Pap smear in November 2020 were normal. She has not yet had a colonoscopy given her age. She does not regularly do any other cancer screening at this time.  Antonella reported past social tobacco use (in college; quit in 2007), and consuming alcohol about 5 times per week.    Family History: (Please see scanned pedigree for detailed family history information)  Children/Siblings    Daughter, age 7, healthy.    Two brothers, ages 45 and 47, both healthy with no known cancer histories.  Her 45-year-old brother's son, age 13, has a history of precancerous cells removed from his leg at age 7; treated with cream per Antonella.  Maternal    Mother passed at age 55 from pancreatic cancer diagnosed at age 54.  Arely reports that her mother had a hysterectomy but is unsure of that reason or status of the ovaries.    Maternal aunt, in her early 60s, with a history of a breast cancer (IDC) diagnosed in her late 50s; she underwent a unilateral mastectomy and is on hormone therapy.    Maternal aunt, age 73, with a history of skin cancer (unknown type/location) diagnosed in her 60s.    No cancer history reported in either of her maternal grandparents or any of her maternal first cousins.  Paternal    Father, age 73, healthy with no cancer history.    Paternal grandmother passed in her 70s from colon  cancer diagnosed in her 70s.    No cancer history reported in two paternal uncles, one paternal aunt, her paternal grandfather, or any of her paternal first cousins.  She does report one paternal first cousin, in his 30's, with recent history of precancerous colon polyps.      Her maternal ethnicity is Croatian, [place], Estonian, and English. Her paternal ethnicity is Guyanese.  There is no known Ashkenazi Anglican ancestry on either side of her family. There is no reported consanguinity.    Discussion:    Antonella's personal and family history of breast cancer and family history of pancreatic cancer is suggestive of a hereditary cancer syndrome.    We reviewed the features of sporadic, familial, and hereditary cancers. In looking at Antonella's family history, it is possible that a cancer susceptibility gene is present due to her early onset breast cancer diagnosis, her mother's pancreatic cancer history, and her maternal aunt's breast cancer history.    We discussed the natural history and genetics of hereditary cancers. A detailed handout regarding the information we discussed will be sent to Antonella in [place] mail. Topics included: inheritance pattern, cancer risks, cancer screening recommendations, and also risks, benefits and limitations of testing.    We discussed the BRCA1 and BRCA2 genes, which are associated with a condition known as Hereditary Breast and Ovarian Cancer syndrome (HBOC). Women with a mutation in either of these genes are at increased risk for breast and ovarian cancer. There is also an increased risk for a second primary breast cancer for women. Men with a mutation in either BRCA1 or BRCA2 are at increased risk for male breast and prostate cancer. Both women and men may also be at increased risk for pancreatic cancer and melanoma.     Based on her personal and family history, Antonella meets current National Comprehensive Cancer Network (NCCN) criteria for genetic testing of high penetrance breast and/or ovarian  cancer susceptibility genes as well as pancreatic cancer susceptibility genes.      We discussed that there are additional genes that could cause increased risk for breast and gastrointestinal cancers. As many of these genes present with overlapping features in a family and accurate cancer risk cannot always be established based upon the pedigree analysis alone, it would be reasonable for Antonella to consider panel genetic testing to analyze multiple genes at once.    We discussed genetic testing options for Antonella given her personal and family history including genes associated with breast cancer risk ([facility] Breast and Gyn Cancer Panel; 23 genes) and including genes associated with breast and/or gastrointestinal cancer risk  ([facility] Common Hereditary Cancers Panel; 47 genes).  After discussion Arely opted to proceed with genetic testing via the Common Hereditary Cancers panel through [facility].  Genetic testing is available for 47 genes associated with cancers of the breast, ovary, uterus, prostate and gastrointestinal system: [facility] Common Hereditary Cancers panel (APC, TRAVIS, AXIN2, BARD1, BMPR1A, BRCA1, BRCA2, BRIP1, CDH1, CDK4, CDKN2A, CHEK2, CTNNA1, DICER1, EPCAM, GREM1, HOXB13, KIT, MEN1, MLH1, MSH2, MSH3, MSH6, MUTYH, NBN, NF1, NTHL1, PALB2, PDGFRA, PMS2, POLD1, POLE, PTEN,RAD50, RAD51C, RAD51D, SDHA, SDHB, SDHC, SDHD, SMAD4, SMARCA4, STK11, TP53,TSC1, TSC2, VHL).    We discussed that many of these genes are associated with specific hereditary cancer syndromes and published management guidelines: Hereditary Breast and Ovarian Cancer syndrome (BRCA1, BRCA2), Ludwig syndrome (MLH1, MSH2, MSH6, PMS2, EPCAM), Familial Adenomatous Polyposis (APC), Hereditary Diffuse Gastric Cancer (CDH1), Familial Atypical Multiple Mole Melanoma syndrome (CDK4, CDKN2A), Multiple Endocrine Neoplasia type 1 (MEN1), Juvenile Polyposis syndrome (BMPR1A, SMAD4), Cowden syndrome (PTEN), Li Fraumeni syndrome (TP53), Hereditary Paraganglioma and  Pheochromocytoma syndrome (SDHA, SDHB, SDHC, SDHD), Peutz-Jeghers syndrome (STK11), MUTYH Associated Polyposis (MUTYH), Tuberous sclerosis complex (TSC1, TSC2), Von Hippel-Lindau disease (VHL), and Neurofibromatosis type 1 (NF1).   The TRAVIS, AXIN2, BRIP1, CHEK2, GREM1, MSH3, NBN, NTHL1, PALB2, POLD1, POLE, RAD51C, and RAD51D genes are associated with increased cancer risk and have published management guidelines for certain cancers.   The remaining genes (BARD1, CTNNA1, DICER1, HOXB13, KIT, PDGFRA, RAD50, and SMARCA4) are associated with increased cancer risk and may allow us to make medical recommendations when mutations are identified.     Consent was obtained over the phone with no witness required due to the current covid19 global pandemic.    Medical Management: For Antonella, we reviewed that the information from genetic testing may determine:    additional cancer screening for which Antonella may qualify (i.e. mammogram and breast MRI, more frequent colonoscopies, more frequent dermatologic exams, etc.),    options for risk reducing surgeries Antonella could consider (i.e. bilateral mastectomy, surgery to remove her ovaries and/or uterus, etc.),      and targeted chemotherapies for Antonella's active cancer, or if she were to develop certain cancers in the future (i.e. immunotherapy for individuals with Ludwig syndrome, PARP inhibitors, etc.).     These recommendations and possible targeted chemotherapies will be discussed in detail once genetic testing is completed.    I explained that Antonella will be contacted by our scheduling team to set up a lab appointment to get her blood drawn for genetic testing at either [facility] cancer care lab or [facility] cancer care lab at her earliest convenience.   We discussed that Antonella's treatment planning are pending genetic testing results. Follow-up from her lumpectomy is schedule 6/17. For that reason, I will place a STAT request on the genes that may impact further surgical consideration  (TRAVIS, BRCA1, BRCA2, CDH1, CHEK2, PALB2, PTEN, STK11 and TP53) in order to get these results back as soon as possible.     Plan:  1) Today Antonella elected to proceed with Common Hereditary Cancers panel genetic testing (47 genes) through [facility].  2) A STAT request was placed on the 9 genes that may impact treatment planning (including BRCA1 and BRCA2) and we will get these results back in 7-10 days. I will call Antonella with these results as soon as they are available.     Time spent over the phone: 55 minutes    Jadyn Carbajal MS, [facility]  Licensed Genetic Counselor  [facility]  [phone]

## 2021-06-26 NOTE — PROGRESS NOTES
Antonella comes in today because of increased swelling in her left breast.  She states that she was doing great until she was actually sitting at the lab this morning waiting to get her blood drawn for her genetic testing when she only noticed pain and swelling in her left breast.  She went to the emergency room and they basically did nothing.  They checked a hemoglobin and did a chest x-ray.  They did not even wrap her.  She went home and then felt like she noticed the swelling was increasing.  She then wrapped in an Ace bandage and came to my office.    Physical exam:  Looks well.  Mild swelling in the left breast.  Its not massive by any means.    Impression: Mild to moderate hematoma in the left breast.  I gave her the option of just leaving this alone versus evacuation.  The problem is that on her pathology from her lumpectomy, she does have a positive margin and so needs a reexcision lumpectomy.  She feels like this is still growing and so was uncomfortable just watching it so for that reason I will take her back and evacuate the hematoma and then if the tissues are relatively healthy will go ahead and do a reexcision of the medial margin that was positive.  The other margin that was positive was the deep margin but this is basically chest wall and so I do not think there is anything more I can do there.    Plan: She will go over to [facility] right now for reexcision lumpectomy and evacuation of the hematoma.

## 2021-06-26 NOTE — TELEPHONE ENCOUNTER
I called Antonella back and explained that she is cancer free via pathology and that she is due to see RO and MO and they will use words like adjuvant and I explained what that means. She expressed understanding. We talked about even though cancer treatment is pretty black and white there still can be different schools of thought or opinions about terms, cancer free, cured, remission. I told her that when she sees RO and MO that they will explain why they would recommend hormone therapy or radiation. As well as, how long she would be on HRT and how long she would follow with MO after her 5 years on HRT. She thanked me for the information and welcomed her visit with RO and MO. I encouraged her to call if she had more questions. NABILA Tierney OCN, CBCN

## 2021-06-26 NOTE — PROGRESS NOTES
In for follow-up of her left lumpectomy  with sentinel lymph node biopsy followed by a reexcision lumpectomy with hematoma evacuation 3 days ago.  She is feeling well.  She is having very minimal pain.      Physical exam:  Appears well.  Does not appear in any discomfort.  Breasts: Incisions are healing nicely with no signs of infection.  No swelling.  No bruising.    Pathology: The tumor was 1.5 cm.  The margins were negative on the reexcision, there was no further tumor seen.  Her sentinel lymph node is negative.    Impression: Postop visit. Doing well.  Reviewed her final pathology.  At this point everything looks really good.  Her margins are widely negative.  Her tumor has already been sent for Oncotype and explained how that is used to help determine whether or not she needs chemotherapy or not.  We talked about the options for oncologist and she feels [facility] would be the best location for her.    Plan:  [facility].  Follow-up with me in 1 year with bilateral mammograms.  I will also call her when the Oncotype is back.

## 2021-06-26 NOTE — ANESTHESIA CARE TRANSFER NOTE
Last vitals:   Vitals:    06/09/21 0909   BP: 93/52   Pulse: 60   Resp: 14   Temp: 37  C (98.6  F)   SpO2: 100%     Patient's level of consciousness is drowsy  Spontaneous respirations: yes  Maintains airway independently: yes  Dentition unchanged: yes  Oropharynx: oropharynx clear of all foreign objects    QCDR Measures:  ASA# 20 - Surgical Safety Checklist: WHO surgical safety checklist completed prior to induction    PQRS# 430 - Adult PONV Prevention: 4558F - Pt received => 2 anti-emetic agents (different classes) preop & intraop  ASA# 8 - Peds PONV Prevention: NA - Not pediatric patient, not GA or 2 or more risk factors NOT present  PQRS# 424 - Florecita-op Temp Management: 4559F - At least one body temp DOCUMENTED => 35.5C or 95.9F within required timeframe  PQRS# 426 - PACU Transfer Protocol: - Transfer of care checklist used  ASA# 14 - Acute Post-op Pain: ASA14B - Patient did NOT experience pain >= 7 out of 10

## 2021-06-26 NOTE — PROGRESS NOTES
Per Dr. Ramirez's  request, order for Oncotype submitted through [email or web address] online portal.

## 2021-06-26 NOTE — TELEPHONE ENCOUNTER
New Patient Oncology Nurse Navigator Note     Referring provider: Dr. Sigrid Ramirez     Referring Clinic/Organization: [facility]     Referred to (specialty): Medical and radiation oncology    Requested provider (if applicable): N/A     Date Referral Received: 6/18/2021     Evaluation for : Breast cancer     Clinical History (per Nurse review of records provided):      5/18/21 - [facility]-Bilateral diagnostic mammogram performed at [facility] to evaluate palpable lump in the left breast.  Left superior posterior breast 12 o clock palpable area of concern corresponds with a spiculated mass measuring approximately 9 mm in greatest dimension  No other area of concern in either breast.  The ultrasound also confirmed palpable lump left breast 12 o clock location corresponds with a taller than wide hypoechoic mass with internal vascularity and irregular margins.  Dimensions at sonography approximately 6 x 8 x 6 mm.  Left axillary lymph nodes appear sonographically normal.    5/19/21 - Left breast US-guided core biopsy performed at [facility].  Is was analyzed to be invasive ductal carcinoma, measuring 0.7 cm in core biopsy, Grade 1, ER/NC+, HER2 neg, and DCIS, Grade 2    6/9/2021 Dr Ramirez performed left lumpectomy with sentinel lymph node biopsy  A) LEFT BREAST, ORIENTED LUMPECTOMY:        1) INVASIVE DUCTAL CARCINOMA             a) Grade: Mathis Grade I (of III)             b) Size:  15 x 11 x 9 mm (measured and calculated in slides)   c) Margins: Uninvolved, but very close to, at 0.8 mm from the nearest posterior margin, and 4 mm from anterior margin        2) DUCTAL CARCINOMA IN SITU: EIC Negative (SEE COMMENT)             a) Nuclear grade: Low to intermediate             b) Patterns: Cribriform and solid, with focal comedonecrosis             c) Size:  20% of the tumor, and measured up to 18 x 4 mm in sections away from invasive carcinoma             d) Margins:  Positive, at posterior (multifocal) and medial (unifocal) margins        3) ADDITIONAL FINDINGS:             Background breast with focal secretory activities and duct ectasia, no atypia             Focal lymph-vascular tumor involvement in block A10        4) Previous biopsy site present, with organizing changes and foreign body giant cell reaction     B) LEFT SENTINEL LYMPH NODE, BIOPSY:        - TWO BENIGN LYMPH NODES (0/2) WITH NO EVIDENCE OF METASTATIC CARCINOMA     PATHOLOGIC STAGE: pT1c, (sn)pN0, pM-Not applicable     COMMENT:   The CD34 and HMWC/p63 immunostains were performed in multiple representative sections to investigate the proportion of in situ and   invasive carcinoma, and possibilities of lymph-vascular invasion.  There is no convincing evidence of lymph-vascular tumor involvement identified   in blocks A14 and A17.  However, focal lymph-vascular invasion is noted in block A10.     The HMWC/p63 highlights good portion of DCIS adjacent to the invasive carcinoma, and the DCIS is also identified away from the invasive   carcinoma.  Although the amount of DCIS identified in the area associated with invasive carcinoma does not fit the criteria (less than   25%) of extensive presence of DCIS, there are scattered foci of DCIS with low grade nuclei in multiple sections (primarily inferior to the   invasive ductal carcinoma lesion).  The DCIS measured up to 18 x 4 mm in block A20.  DCIS is at the posterior margin in multiple sections and at   the medial margin in at least one section.  Appropriate clinical correlation and follow up is recommended.    6/10/21 - Patient met with Jadyn Carbajal, Genetic Counselor and Antonella elected to proceed with Common Hereditary Cancers panel genetic testing (47 genes) through [email or web address].  A STAT request was placed on the 9 genes that may impact treatment planning (including BRCA1 and BRCA2) and we will get these results back in 7-10 days.  Lab draw occurred on 6/17/21 and  results predicted to be available 6/25-6/28.       6/14/21 - Dr. Ramirez performed a re-excision lumpectomy and evacuation of hematoma.  A) Left breast, additional medial margin, oriented biopsy:        - no evidence of in situ or invasive carcinoma        - focal changes consistent with previous biopsy cavity   - Benign adipose tissue and breast with focal adenosis and duct ectasia, no atypia        - benign additional biopsy margins   B) Left breast, deep fascia, biopsy:        - no evidence of in situ or invasive carcinoma        - focal changes consistent with previous lumpectomy cavity, associated with inflammation, histiocytic infiltrate and foreign body giant cell reaction      Clinical Assessment / Barriers to Care (Per Nurse):N/A     Records Location (Care Everywhere, Media, etc.): Media     Records Needed: Oncotype is pending with estimated report date of 6/29/21.  Genetic testing results are pending and should be available 6/25-6/28.     Additional testing needed prior to consult: N/A    Telephoned spoke with Antonella and consults scheduled with Dr. Rosa and Dr. Mayer on 6/30/21.  Antonella verbalized understanding of appointment location, details, and visitor policy.  She gave permission for welcome letters for these appointments to be sent via [employer] and those have gone out.  Antonella has writer's number and colleague Juanis Galdamez RN if questions arise before her appointments. Gretchen Maradiaga RN

## 2021-06-26 NOTE — ANESTHESIA POSTPROCEDURE EVALUATION
Patient: Antonella Jamison  Procedure(s):  Left Lumpectomy (With [facility] u/s): [place] Lymph Node Biopsy (Left)  Anesthesia type: MAC    Patient location: PACU  Last vitals:   Vitals Value Taken Time   /67 06/09/21 0930   Temp 37  C (98.6  F) 06/09/21 0909   Pulse 76 06/09/21 0932   Resp 16 06/09/21 0930   SpO2 100 % 06/09/21 0932   Vitals shown include unvalidated device data.  Post vital signs: stable  Level of consciousness: awake and responds to simple questions  Post-anesthesia pain: pain controlled  Post-anesthesia nausea and vomiting: no  Pulmonary: unassisted, return to baseline  Cardiovascular: stable and blood pressure at baseline  Hydration: adequate  Anesthetic events: no    QCDR Measures:  ASA# 11 - Florecita-op Cardiac Arrest: ASA11B - Patient did NOT experience unanticipated cardiac arrest  ASA# 12 - Florecita-op Mortality Rate: ASA12B - Patient did NOT die  ASA# 13 - PACU Re-Intubation Rate: ASA13B - Patient did NOT require a new airway mgmt  ASA# 10 - Composite Anes Safety: ASA10A - No serious adverse event    Additional Notes:

## 2021-06-26 NOTE — PROGRESS NOTES
Pt waiting to be checked in for her lab only visit. Pt verbalized chest pain to IC. IC alerted nurse who came to assess pt. Pt c/o pain in her left breast and hard lump at lumpectomy site. She states pain is intense and came on all of a sudden. Dr. Traore arrived to assess pt and felt pt should be brought to ED. Pt escorted to ED via WC for evaluation.

## 2021-06-26 NOTE — ANESTHESIA POSTPROCEDURE EVALUATION
Patient: Antonella Jamison  Procedure(s):  Evacuation Hematoma, Re-excision Lumpectomy (Left)  Anesthesia type: MAC    Patient location: PACU  Last vitals:   Vitals Value Taken Time   /65 06/14/21 1521   Temp 36.2  C (97.2  F) 06/14/21 1521   Pulse 85 06/14/21 1521   Resp 16 06/14/21 1521   SpO2 98 % 06/14/21 1521     Post vital signs: stable  Level of consciousness: awake and responds to simple questions  Post-anesthesia pain: pain controlled  Post-anesthesia nausea and vomiting: no  Pulmonary: unassisted, return to baseline  Cardiovascular: stable and blood pressure at baseline  Hydration: adequate  Anesthetic events: no    QCDR Measures:  ASA# 11 - Florecita-op Cardiac Arrest: ASA11B - Patient did NOT experience unanticipated cardiac arrest  ASA# 12 - Florecita-op Mortality Rate: ASA12B - Patient did NOT die  ASA# 13 - PACU Re-Intubation Rate: ASA13B - Patient did NOT require a new airway mgmt  ASA# 10 - Composite Anes Safety: ASA10A - No serious adverse event    Additional Notes:

## 2021-06-26 NOTE — PROGRESS NOTES
Antonella presents to [facility] today to see Dr. Ramirez for a post op with sudden onset, enlarging hematoma.  She went to the ER early today after suddenly developing symptoms and was discharged to home when it was stable.  She and her nurse friend were concerned it was continuing to feel that it was getting bigger. RN assessment and EMR update.  Patient met with Dr. Ramirez, see dictation for details of visit.  Walked patient and her dad over to the [facility] for hematoma evacuation. RN time 10 mins

## 2021-06-26 NOTE — TELEPHONE ENCOUNTER
Epic Records    6/17/21 - General Surgery progress note - Dr. Ramirez  6/14/21 - Operative note - Dr. Ramirez  6/14/21 - General Surgery progress note - Dr. Ramirez  6/9/21 - Operative note - Dr. Ramirez  5/28/21 - General Surgery consult note - Dr. Ramirez    6/14/21 - ED provider note - Dr. Sohail Huggins  6/14/21 - ED Encounter note - Sabra Aparicio PA-C    6/10/21 - Genetics telephone encounter - Jadyn Kowalski    Lab/Pathology  6/14/21 - Surgical Pathology  6/9/21 - Surgical Pathology    Radiology -  All images available in Nil  6/14/21 - CXR  6/9/21 - [place] lymph node injection    Epic Media   6/21/21 - Oncotype results    Radiology - CDI - All images available in Nil  5/19/21 - Left Breast US guided core biopsy  5/18/21 - Bilateral breast diagnostic Mammo with Left Breast US.    1/29/21, 11/27/20 & 11/23/20 - [facility] PCP office notes.

## 2021-07-01 ENCOUNTER — COMMUNICATION - HEALTHEAST (OUTPATIENT)
Dept: ONCOLOGY | Facility: CLINIC | Age: 41
End: 2021-07-01
Payer: COMMERCIAL

## 2021-07-04 NOTE — LETTER
Letter by Gretchen Maradiaga RN at      Author: Gretchen Maradiaga RN Service: -- Author Type: --    Filed:  Encounter Date: 6/18/2021 Status: (Other)       Dear Antonella:   MEDICAL ONCOLOGY CONSULT    Thank you for choosing [facility] for your care.  We are committed to providing you with the highest quality and compassionate healthcare services.  The following information pertains to your first medical oncology appointment with our clinic.    Date/Time of appointment:  Wednesday, June 30, 2021--please arrive no later than 10:30am for your 11:00am consult with Dr. Rosa    Name of your Physician:  Sheri Rosa MD    What to bring to your appointment    Your current insurance card(s).    One visitor may accompany you to this appointment    Parking:    Please refer to the map included below to direct you to [facility]    The Cancer Center parking lot is west of the main hospital entrance. This is free parking and is located right next to the Cancer Center entrance.    Come in the Cancer Center entrance and check in at the .    We hope these instructions are helpful to you.  If you have any questions or concerns, please call us at [phone].  It is our pleasure to assist you.    Warm Regards,  Gretchen Maradiaga RN, BSN, OCN, CBCN  Cancer Care Navigator  [phone]

## 2021-07-04 NOTE — ADDENDUM NOTE
Addendum Note by Lombardi, Susan L, RN at 6/14/2021  2:00 PM     Author: Lombardi, Susan L, RN Service: -- Author Type: RN, Care Manager    Filed: 6/14/2021  2:47 PM Date of Service: 6/14/2021  2:00 PM Status: Signed    : Lombardi, Susan L, RN (RN, Care Manager)    Encounter addended by: Lombardi, Susan L, RN on: 6/14/2021  2:47 PM      Actions taken: Chief Complaint modified, Visit Navigator SmartForm   Flowsheet section accepted, Allergies reviewed, Order Reconciliation   Section accessed, Medication List reviewed, Clinical Note Signed, Charge   Capture section accepted

## 2021-07-04 NOTE — LETTER
"Letter by Jadyn Carbajal, Genetic Counselor at      Author: Jadyn Carbajal, Genetic Counselor Service: -- Author Type: --    Filed:  Encounter Date: 6/10/2021 Status: (Other)         Sigrid Ramirez MD  [address] [address]                                  June 11, 2021    Patient: Antonella Jamison   MR Number: [id number]   YOB: 1980   Date of Visit: 6/10/2021     Dear Dr. Ashley MD:    Thank you for referring Antonella Jamison to me for evaluation. Below are the relevant portions of my assessment and plan of care.    If you have questions, please do not hesitate to call me. I look forward to following Antonella along with you.    Sincerely,        Jadyn Carbajal, Genetic Counselor          CC  MD Raven Lopez Anna R, Genetic Counselor  6/11/2021 11:50 AM  Sign when Signing Visit  6/10/2021     Antonella Jamison is a 40 y.o. female who is being evaluated via a billable telephone visit.      The patient has been notified of following:     \"This telephone visit will be conducted via a call between you and your physician/provider. We have found that certain health care needs can be provided without the need for a physical exam.  This service lets us provide the care you need with a short phone conversation.  If a prescription is necessary we can send it directly to your pharmacy.  If lab work is needed we can place an order for that and you can then stop by our lab to have the test done at a later time.    Telephone visits are billed at different rates depending on your insurance coverage. During this emergency period, for some insurers they may be billed the same as an in-person visit.  Please reach out to your insurance provider with any questions.    If during the course of the call the physician/provider feels a telephone visit is not appropriate, you will not be charged for this service.\"    Patient has given verbal consent to a Telephone visit? Yes    What phone " number would you like to be contacted at? [phone]    Patient would like to receive their AVS by AVS Preference: Mail a copy.    Referring Provider: Sigrid Ramirez MD    Present for Today's Visit: Antonella    Presenting Information:   I met with Antonella Jamison today for genetic counseling (telephone visit due to covid19) to discuss her personal and family history of breast and gastrointestinal cancer.  She is here today to review this history, cancer screening recommendations, and available genetic testing options.    Personal History:  Antonella is a 40 y.o. female. She was recently diagnosed with a left breast cancer picked up on screening mammogram. Biopsy completed 5/19/2021 revealed an invasive ductal carcinoma with associated DCIS; ER positive, AL positive, and HER2 negative. She underwent a left lumpectomy on 6/9/2021 and has follow-up scheduled with Dr. Harvey scheduled for 6/17/2021 to review final pathology and next steps.     She reports that she has a history of a precancerous hairy nevus removed from her right cheek when she was a child (<10 years).       She had her first menstrual period at age 11 or 12, her first child at age 33, and is premenopausal.  Antonella has her ovaries, fallopian tubes and uterus in place, and she has had no ovarian cancer screening to date.  She reports past history of oral contraceptive use on and off from approximately ages 17 to 31. and that she has never been on hormone replacement therapy.      Her most recent OB-GYN exam and Pap smear in November 2020 were normal. She has not yet had a colonoscopy given her age. She does not regularly do any other cancer screening at this time.  Antonella reported past social tobacco use (in college; quit in 2007), and consuming alcohol about 5 times per week.    Family History: (Please see scanned pedigree for detailed family history information)  Children/Siblings    Daughter, age 7, healthy.    Two brothers, ages 45 and 47, both healthy with no  known cancer histories.  Her 45-year-old brother's son, age 13, has a history of precancerous cells removed from his leg at age 7; treated with cream per Antonella.  Maternal    Mother passed at age 55 from pancreatic cancer diagnosed at age 54.  Arely reports that her mother had a hysterectomy but is unsure of that reason or status of the ovaries.    Maternal aunt, in her early 60s, with a history of a breast cancer (IDC) diagnosed in her late 50s; she underwent a unilateral mastectomy and is on hormone therapy.    Maternal aunt, age 73, with a history of skin cancer (unknown type/location) diagnosed in her 60s.    No cancer history reported in either of her maternal grandparents or any of her maternal first cousins.  Paternal    Father, age 73, healthy with no cancer history.    Paternal grandmother passed in her 70s from colon cancer diagnosed in her 70s.    No cancer history reported in two paternal uncles, one paternal aunt, her paternal grandfather, or any of her paternal first cousins.  She does report one paternal first cousin, in his 30's, with recent history of precancerous colon polyps.      Her maternal ethnicity is English, Telugu, Portuguese, and English. Her paternal ethnicity is [place].  There is no known Ashkenazi Spiritism ancestry on either side of her family. There is no reported consanguinity.    Discussion:    Antonella's personal and family history of breast cancer and family history of pancreatic cancer is suggestive of a hereditary cancer syndrome.    We reviewed the features of sporadic, familial, and hereditary cancers. In looking at Antonella's family history, it is possible that a cancer susceptibility gene is present due to her early onset breast cancer diagnosis, her mother's pancreatic cancer history, and her maternal aunt's breast cancer history.    We discussed the natural history and genetics of hereditary cancers. A detailed handout regarding the information we discussed will be sent to Antonella in [place]  mail. Topics included: inheritance pattern, cancer risks, cancer screening recommendations, and also risks, benefits and limitations of testing.    We discussed the BRCA1 and BRCA2 genes, which are associated with a condition known as Hereditary Breast and Ovarian Cancer syndrome (HBOC). Women with a mutation in either of these genes are at increased risk for breast and ovarian cancer. There is also an increased risk for a second primary breast cancer for women. Men with a mutation in either BRCA1 or BRCA2 are at increased risk for male breast and prostate cancer. Both women and men may also be at increased risk for pancreatic cancer and melanoma.     Based on her personal and family history, Antonella meets current National Comprehensive Cancer Network (NCCN) criteria for genetic testing of high penetrance breast and/or ovarian cancer susceptibility genes as well as pancreatic cancer susceptibility genes.      We discussed that there are additional genes that could cause increased risk for breast and gastrointestinal cancers. As many of these genes present with overlapping features in a family and accurate cancer risk cannot always be established based upon the pedigree analysis alone, it would be reasonable for Antonella to consider panel genetic testing to analyze multiple genes at once.    We discussed genetic testing options for Antonella given her personal and family history including genes associated with breast cancer risk ([facility] Breast and Gyn Cancer Panel; 23 genes) and including genes associated with breast and/or gastrointestinal cancer risk  ([facility] Common Hereditary Cancers Panel; 47 genes).  After discussion Arely opted to proceed with genetic testing via the Common Hereditary Cancers panel through [facility].  Genetic testing is available for 47 genes associated with cancers of the breast, ovary, uterus, prostate and gastrointestinal system: [facility] Common Hereditary Cancers panel (APC, TRAVIS, AXIN2, BARD1, BMPR1A,  BRCA1, BRCA2, BRIP1, CDH1, CDK4, CDKN2A, CHEK2, CTNNA1, DICER1, EPCAM, GREM1, HOXB13, KIT, MEN1, MLH1, MSH2, MSH3, MSH6, MUTYH, NBN, NF1, NTHL1, PALB2, PDGFRA, PMS2, POLD1, POLE, PTEN,RAD50, RAD51C, RAD51D, SDHA, SDHB, SDHC, SDHD, SMAD4, SMARCA4, STK11, TP53,TSC1, TSC2, VHL).    We discussed that many of these genes are associated with specific hereditary cancer syndromes and published management guidelines: Hereditary Breast and Ovarian Cancer syndrome (BRCA1, BRCA2), Ludwig syndrome (MLH1, MSH2, MSH6, PMS2, EPCAM), Familial Adenomatous Polyposis (APC), Hereditary Diffuse Gastric Cancer (CDH1), Familial Atypical Multiple Mole Melanoma syndrome (CDK4, CDKN2A), Multiple Endocrine Neoplasia type 1 (MEN1), Juvenile Polyposis syndrome (BMPR1A, SMAD4), Cowden syndrome (PTEN), Li Fraumeni syndrome (TP53), Hereditary Paraganglioma and Pheochromocytoma syndrome (SDHA, SDHB, SDHC, SDHD), Peutz-Jeghers syndrome (STK11), MUTYH Associated Polyposis (MUTYH), Tuberous sclerosis complex (TSC1, TSC2), Von Hippel-Lindau disease (VHL), and Neurofibromatosis type 1 (NF1).   The TRAVIS, AXIN2, BRIP1, CHEK2, GREM1, MSH3, NBN, NTHL1, PALB2, POLD1, POLE, RAD51C, and RAD51D genes are associated with increased cancer risk and have published management guidelines for certain cancers.   The remaining genes (BARD1, CTNNA1, DICER1, HOXB13, KIT, PDGFRA, RAD50, and SMARCA4) are associated with increased cancer risk and may allow us to make medical recommendations when mutations are identified.     Consent was obtained over the phone with no witness required due to the current covid19 global pandemic.    Medical Management: For Antonella, we reviewed that the information from genetic testing may determine:    additional cancer screening for which Antonella may qualify (i.e. mammogram and breast MRI, more frequent colonoscopies, more frequent dermatologic exams, etc.),    options for risk reducing surgeries Antonella could consider (i.e. bilateral mastectomy, surgery  to remove her ovaries and/or uterus, etc.),      and targeted chemotherapies for Antonella's active cancer, or if she were to develop certain cancers in the future (i.e. immunotherapy for individuals with Ludwig syndrome, PARP inhibitors, etc.).     These recommendations and possible targeted chemotherapies will be discussed in detail once genetic testing is completed.    I explained that Antonella will be contacted by our scheduling team to set up a lab appointment to get her blood drawn for genetic testing at either [facility] cancer care lab or [facility] cancer [facility] lab at her earliest convenience.   We discussed that Antonella's treatment planning are pending genetic testing results. Follow-up from her lumpectomy is schedule 6/17. For that reason, I will place a STAT request on the genes that may impact further surgical consideration (TRAVIS, BRCA1, BRCA2, CDH1, CHEK2, PALB2, PTEN, STK11 and TP53) in order to get these results back as soon as possible.     Plan:  1) Today Antonella elected to proceed with Common Hereditary Cancers panel genetic testing (47 genes) through [facility].  2) A STAT request was placed on the 9 genes that may impact treatment planning (including BRCA1 and BRCA2) and we will get these results back in 7-10 days. I will call Antonella with these results as soon as they are available.     Time spent over the phone: 55 minutes    Jadyn Carbajal MS, [facility]  Licensed Genetic Counselor  [facility]  [phone]

## 2021-07-04 NOTE — LETTER
Letter by Gretchen Maradiaga RN at      Author: Gretchen Maradiaga RN Service: -- Author Type: --    Filed:  Encounter Date: 6/18/2021 Status: (Other)       Dear Antonella:   RADIATION ONCOLOGY CONSULT    Thank you for choosing [facility] for your care.  We are committed to providing you with the highest quality and compassionate healthcare services.  The following information pertains to your first radiation oncology appointment with our clinic.    Date/Time of appointment:  Wednesday, June 30, 2021--Please arrive no later than 1:00pm for your 1:30pm consult with Dr. Mayer    Name of your Physician:  Kim Mayer MD    What to bring to your appointment:    Your current insurance card(s).    One visitor may accompany you to this appointment    Parking:    Please refer to the map included below to direct you to [facility]    The Cancer Center parking lot is west of the main hospital entrance. This is free parking and is located right next to the Cancer Center entrance.    Come in the Cancer Center entrance and check in at the .    We hope these instructions are helpful to you.  If you have any questions or concerns, please call us at [phone].  It is our pleasure to assist you.    Warm Regards,  Gretchen Maradiaga RN, BSN, OCN, CBCN  Cancer Care Navigator  [phone]

## 2021-07-04 NOTE — LETTER
Letter by Jadyn Carbajal, Genetic Counselor at      Author: Jadyn Carbajal, Genetic Counselor Service: -- Author Type: --    Filed:  Encounter Date: 6/10/2021 Status: (Other)         Antonella Jamison  [address] [facility] [address]      June 11, 2021      Dear MsJames Jamison,    It was a pleasure speaking with you on the phone on 6/10/2021.  Here is a copy of the progress note from our discussion.  If you have any additional questions, please feel free to call.    Referring Provider: Sigrid Ramirez MD    Present for Today's Visit: Antonella    Presenting Information:   I met with Antonella Gerardbrennoncathy today for genetic counseling (telephone visit due to covid19) to discuss her personal and family history of breast and gastrointestinal cancer.  She is here today to review this history, cancer screening recommendations, and available genetic testing options.    Personal History:  Antonella is a 40 y.o. female. She was recently diagnosed with a left breast cancer picked up on screening mammogram. Biopsy completed 5/19/2021 revealed an invasive ductal carcinoma with associated DCIS; ER positive, LA positive, and HER2 negative. She underwent a left lumpectomy on 6/9/2021 and has follow-up scheduled with Dr. Harvey scheduled for 6/17/2021 to review final pathology and next steps.     She reports that she has a history of a precancerous hairy nevus removed from her right cheek when she was a child (<10 years).       She had her first menstrual period at age 11 or 12, her first child at age 33, and is premenopausal.  Antonella has her ovaries, fallopian tubes and uterus in place, and she has had no ovarian cancer screening to date.  She reports past history of oral contraceptive use on and off from approximately ages 17 to 31. and that she has never been on hormone replacement therapy.      Her most recent OB-GYN exam and Pap smear in November 2020 were normal. She has not yet had a colonoscopy given her age. She does not regularly do any  other cancer screening at this time.  Antonella reported past social tobacco use (in college; quit in 2007), and consuming alcohol about 5 times per week.    Family History: (Please see scanned pedigree for detailed family history information)  Children/Siblings    Daughter, age 7, healthy.    Two brothers, ages 45 and 47, both healthy with no known cancer histories.  Her 45-year-old brother's son, age 13, has a history of precancerous cells removed from his leg at age 7; treated with cream per Antonella.  Maternal    Mother passed at age 55 from pancreatic cancer diagnosed at age 54.  Arely reports that her mother had a hysterectomy but is unsure of that reason or status of the ovaries.    Maternal aunt, in her early 60s, with a history of a breast cancer (IDC) diagnosed in her late 50s; she underwent a unilateral mastectomy and is on hormone therapy.    Maternal aunt, age 73, with a history of skin cancer (unknown type/location) diagnosed in her 60s.    No cancer history reported in either of her maternal grandparents or any of her maternal first cousins.  Paternal    Father, age 73, healthy with no cancer history.    Paternal grandmother passed in her 70s from colon cancer diagnosed in her 70s.    No cancer history reported in two paternal uncles, one paternal aunt, her paternal grandfather, or any of her paternal first cousins.  She does report one paternal first cousin, in his 30's, with recent history of precancerous colon polyps.      Her maternal ethnicity is Palestinian, Filipino, [place], and English. Her paternal ethnicity is Martiniquais.  There is no known Ashkenazi Catholic ancestry on either side of her family. There is no reported consanguinity.    Discussion:    Antonella's personal and family history of breast cancer and family history of pancreatic cancer is suggestive of a hereditary cancer syndrome.    We reviewed the features of sporadic, familial, and hereditary cancers. In looking at Antonella's family history, it is  possible that a cancer susceptibility gene is present due to her early onset breast cancer diagnosis, her mother's pancreatic cancer history, and her maternal aunt's breast cancer history.    We discussed the natural history and genetics of hereditary cancers. A detailed handout regarding the information we discussed will be sent to Antonella in [place] mail. Topics included: inheritance pattern, cancer risks, cancer screening recommendations, and also risks, benefits and limitations of testing.    We discussed the BRCA1 and BRCA2 genes, which are associated with a condition known as Hereditary Breast and Ovarian Cancer syndrome (HBOC). Women with a mutation in either of these genes are at increased risk for breast and ovarian cancer. There is also an increased risk for a second primary breast cancer for women. Men with a mutation in either BRCA1 or BRCA2 are at increased risk for male breast and prostate cancer. Both women and men may also be at increased risk for pancreatic cancer and melanoma.     Based on her personal and family history, Antonella meets current National Comprehensive Cancer Network (NCCN) criteria for genetic testing of high penetrance breast and/or ovarian cancer susceptibility genes as well as pancreatic cancer susceptibility genes.      We discussed that there are additional genes that could cause increased risk for breast and gastrointestinal cancers. As many of these genes present with overlapping features in a family and accurate cancer risk cannot always be established based upon the pedigree analysis alone, it would be reasonable for Antonella to consider panel genetic testing to analyze multiple genes at once.    We discussed genetic testing options for Antonella given her personal and family history including genes associated with breast cancer risk ([facility] Breast [facility] G[facility] Cancer Panel; 23 genes) and including genes associated with breast and/or gastrointestinal cancer risk  ([facility] Common Hereditary Cancers  Panel; 47 genes).  After discussion Arely opted to proceed with genetic testing via the Common Hereditary Cancers panel through [facility].  Genetic testing is available for 47 genes associated with cancers of the breast, ovary, uterus, prostate and gastrointestinal system: [facility] Common Hereditary Cancers panel (APC, TRAVIS, AXIN2, BARD1, BMPR1A, BRCA1, BRCA2, BRIP1, CDH1, CDK4, CDKN2A, CHEK2, CTNNA1, DICER1, EPCAM, GREM1, HOXB13, KIT, MEN1, MLH1, MSH2, MSH3, MSH6, MUTYH, NBN, NF1, NTHL1, PALB2, PDGFRA, PMS2, POLD1, POLE, PTEN,RAD50, RAD51C, RAD51D, SDHA, SDHB, SDHC, SDHD, SMAD4, SMARCA4, STK11, TP53,TSC1, TSC2, VHL).    We discussed that many of these genes are associated with specific hereditary cancer syndromes and published management guidelines: Hereditary Breast and Ovarian Cancer syndrome (BRCA1, BRCA2), Ludwig syndrome (MLH1, MSH2, MSH6, PMS2, EPCAM), Familial Adenomatous Polyposis (APC), Hereditary Diffuse Gastric Cancer (CDH1), Familial Atypical Multiple Mole Melanoma syndrome (CDK4, CDKN2A), Multiple Endocrine Neoplasia type 1 (MEN1), Juvenile Polyposis syndrome (BMPR1A, SMAD4), Cowden syndrome (PTEN), Li Fraumeni syndrome (TP53), Hereditary Paraganglioma and Pheochromocytoma syndrome (SDHA, SDHB, SDHC, SDHD), Peutz-Jeghers syndrome (STK11), MUTYH Associated Polyposis (MUTYH), Tuberous sclerosis complex (TSC1, TSC2), Von Hippel-Lindau disease (VHL), and Neurofibromatosis type 1 (NF1).   The TRAVIS, AXIN2, BRIP1, CHEK2, GREM1, MSH3, NBN, NTHL1, PALB2, POLD1, POLE, RAD51C, and RAD51D genes are associated with increased cancer risk and have published management guidelines for certain cancers.   The remaining genes (BARD1, CTNNA1, DICER1, HOXB13, KIT, PDGFRA, RAD50, and SMARCA4) are associated with increased cancer risk and may allow us to make medical recommendations when mutations are identified.     Consent was obtained over the phone with no witness required due to the current covid19 global pandemic.    Medical  Management: For Antonella, we reviewed that the information from genetic testing may determine:    additional cancer screening for which Antonella may qualify (i.e. mammogram and breast MRI, more frequent colonoscopies, more frequent dermatologic exams, etc.),    options for risk reducing surgeries Antonella could consider (i.e. bilateral mastectomy, surgery to remove her ovaries and/or uterus, etc.),      and targeted chemotherapies for Antonella's active cancer, or if she were to develop certain cancers in the future (i.e. immunotherapy for individuals with Ludwig syndrome, PARP inhibitors, etc.).     These recommendations and possible targeted chemotherapies will be discussed in detail once genetic testing is completed.    I explained that Antonella will be contacted by our scheduling team to set up a lab appointment to get her blood drawn for genetic testing at either [facility] cancer care lab or [facility] cancer [facility] lab at her earliest convenience.   We discussed that Antonella's treatment planning are pending genetic testing results. Follow-up from her lumpectomy is schedule 6/17. For that reason, I will place a STAT request on the genes that may impact further surgical consideration (TRAVIS, BRCA1, BRCA2, CDH1, CHEK2, PALB2, PTEN, STK11 and TP53) in order to get these results back as soon as possible.     Plan:  1) Today Antonella elected to proceed with Common Hereditary Cancers panel genetic testing (47 genes) through [facility].  2) A STAT request was placed on the 9 genes that may impact treatment planning (including BRCA1 and BRCA2) and we will get these results back in 7-10 days. I will call Antonella with these results as soon as they are available.     Jadyn Carbajal MS, [facility]  Licensed Genetic Counselor  [facility]  [phone]

## 2021-07-04 NOTE — ADDENDUM NOTE
Addendum Note by Lombardi, Susan L, RN at 5/28/2021  9:20 AM     Author: Lombardi, Susan L, RN Service: -- Author Type: RN, Care Manager    Filed: 6/1/2021  8:51 AM Date of Service: 5/28/2021  9:20 AM Status: Signed    : Lombardi, Susan L, RN (RN, Care Manager)    Encounter addended by: Lombardi, Susan L, RN on: 6/1/2021  8:51 AM      Actions taken: Visit diagnoses modified

## 2021-07-04 NOTE — LETTER
Letter by Jadyn Carbajal, Genetic Counselor at      Author: Jadyn Carbajal, Genetic Counselor Service: -- Author Type: --    Filed:  Encounter Date: 6/10/2021 Status: (Other)       [facility]  Hereditary Breast and Gynecologic Cancers  Assessing Cancer Risk  Only about 5-10% of cancers are thought to be due to an inherited cancer susceptibility gene.    These families often have:    Several people with the same or related types of cancer    Cancers diagnosed at a young age (before age 50)    Individuals with more than one primary cancer    Multiple generations of the family affected with cancer    Some people may be candidates for genetic testing of more than one gene.  For these families, genetic testing using a cancer panel may be offered.  These panels will test different genes known to increase the risk for breast, ovarian, uterine, and/or other cancers. All of the genes discussed below have published clinical management guidelines for individuals who are found to carry a mutation. The purpose of this handout is to serve as a brief summary of the genes analyzed by the panels used to inquire about hereditary breast and gynecologic cancer:  TRAVIS, BRCA1, BRCA2, BRIP1, CDH1, CHEK2, MLH1, MSH2, MSH6, PMS2, EPCAM, PTEN, PALB2, RAD51C, RAD51D, and TP53.  ______________________________________________________________________________  Hereditary Breast and Ovarian Cancer Syndrome   (BRCA1 and BRCA2)  A single mutation in one of the copies of BRCA1 or BRCA2 increases the risk for breast and ovarian cancer, among others.  The risk for pancreatic cancer and melanoma may also be slightly increased in some families.  The chart below shows the chance that someone with a BRCA mutation would develop cancer in his or her lifetime1,2,3,4.      Lifetime Cancer Risks    General Population BRCA   Breast 12% ~80%   Ovarian 1-2% 11-40%   Male Breast <1% 7-8%   Prostate 16% 20%       A persons ethnic background is  also important to consider, as individuals of Ashkenazi Mosque ancestry have a higher chance of having a BRCA gene mutation.  There are three BRCA mutations that occur more frequently in this population.    Ludwig Syndrome   (MLH1, MSH2, MSH6, PMS2, and EPCAM)  Currently five genes are known to cause Ludwig Syndrome: MLH1, MSH2, MSH6, PMS2, and EPCAM.  A single mutation in one of the Ludwig Syndrome genes increases the risk for colon, endometrial, ovarian, and stomach cancers.  Other cancers that occur less commonly in Ludwig Syndrome include urinary tract, skin, and brain cancers.  The chart below shows the chance that a person with Ludwig syndrome would develop cancer in his or her lifetime5.      Lifetime Cancer Risks    General Population Ludwig Syndrome   Colon 5.5% ~80%   Endometrial 2.7% 15-60%   Stomach <1% 1-13%   Ovarian 1-2% 4-24%       Cowden Syndrome   (PTEN)  Cowden syndrome is a hereditary condition that increases the risk for breast, thyroid, endometrial, colon, and kidney cancer.  Cowden syndrome is caused by a mutation in the PTEN gene.  A single mutation in one of the copies of PTEN causes Cowden syndrome and increases cancer risk.  The chart below shows the chance that someone with a PTEN mutation would develop cancer in their lifetime6,7.  Other benign features seen in some individuals with Cowden syndrome include benign skin lesions (facial papules, keratoses, lipomas), learning disability, autism, thyroid nodules, colon polyps, and larger head size.      Lifetime Cancer Risks    General Population Cowden Synrome   Breast 12% 25-50%   Thyroid 1% Up to 35%   Renal 1-2% Up to 35%   Endometrial 2.7% Up to 28%   Colon  5.5% 9%   Melanoma 2-3% 6%   ** One recent study found breast cancer risk to be increased to 85%     Li-Fraumeni Syndrome   (TP53)  Li-Fraumeni Syndrome (LFS) is a cancer predisposition syndrome caused by a mutation in the TP53 gene. A single mutation in one of the copies of TP53 increases  the risk for multiple cancers. Individuals with LFS are at an increased risk for developing cancer at a young age. The lifetime risk for development of a LFS-associated cancer is 50% by age 30 and 90% by age 60.   Core Cancers: Sarcomas, Breast, Brain, Lung, Leukemias/Lymphomas, Adrenocortical carcinomas  Other Cancers: Gastrointestinal, Thyroid, Skin, Genitourinary    Hereditary Diffuse Gastric Cancer   (CDH1)  Currently, one gene is known to cause hereditary diffuse gastric cancer (HDGC): CDH1.  Individuals with HDGC are at increased risk for diffuse gastric cancer and lobular breast cancer. Of people diagnosed with HDGC, 30-50% have a mutation in the CDH1 gene.  This suggests there are likely other genes that may cause HDGC that have not been identified yet.      Lifetime Cancer Risks    General Population HDGC    Diffuse Gastric  <1% ~80%   Breast 12% 39-52%         Additional Genes  TRAVIS  TRAVIS is a moderate-risk breast cancer gene. Women who have a mutation in TRAVIS can have between a 2-4 fold increased risk for breast cancer compared to the general population8. TRAVIS mutations have also been associated with increased risk for pancreatic cancer, however an estimate of this cancer risk is not well understood9. Individuals who inherit two TRAVIS mutations have a condition called ataxia-telangiectasia (AT).  This rare autosomal recessive condition affects the nervous system and immune system, and is associated with progressive cerebellar ataxia beginning in childhood.  Individuals with ataxia-telangiectasia often have a weakened immune system and have an increased risk for childhood cancers.    PALB2  Mutations in PALB2 have been shown to increase the risk of breast cancer up to 33-58% in some families; where individuals fall within this risk range is dependent upon family [id number]. PALB2 mutations have also been associated with increased risk for pancreatic cancer, although this risk has not been quantified yet.   Individuals who inherit two PALB2 mutations--one from their mother and one from their father--have a condition called Fanconi Anemia.  This rare autosomal recessive condition is associated with short stature, developmental delay, bone marrow failure, and increased risk for childhood cancers.    CHEK2   CHEK2 is a moderate-risk breast cancer gene.  Women who have a mutation in CHEK2 have around a 2-fold increased risk for breast cancer compared to the general population, and this risk may be higher depending upon family history.11,12,13 Mutations in CHEK2 have also been shown to increase the risk of a number of other cancers, including colon and prostate, however these cancer risks are currently not well understood.    BRIP1, RAD51C and RAD51D  Mutations in BRIP1, RAD51C, and RAD51D have been shown to increase the risk of ovarian cancer and possibly female breast cancer as well14,15 .       Lifetime Cancer Risk    General Population BRIP1 RAD51C RAD51D   Ovarian 1-2% ~5-8% ~5-9% ~7-15%         Inheritance  All of the cancer syndromes reviewed above are inherited in an autosomal dominant pattern.  This means that if a parent has a mutation, each of his or her children will have a 50% chance of inheriting that same mutation.  Therefore, each child--male or female--would have a 50% chance of being at increased risk for developing cancer.    Mutations in some genes can occur de juliocesar, which means that a persons mutation occurred for the first time in them and was not inherited from a parent.  Now that they have the mutation, however, it can be passed on to future generations.    Genetic Testing  Genetic testing involves a blood test and will look at the genetic information in the TRAVIS, BRCA1, BRCA2, BRIP1, CDH1, CHEK2, MLH1, MSH2, MSH6, PMS2, EPCAM, PTEN, PALB2, RAD51C, RAD51D, and TP53 genes for any harmful mutations that are associated with increased cancer risk.  If possible, it is recommended that the person(s) who has  had cancer be tested before other family members.  That person will give us the most useful information about whether or not a specific gene is associated with the cancer in the family.    Results  There are three possible results of genetic testing:    Positive--a harmful mutation was identified in one or more of the genes    Negative--no mutation was identified in any of the genes on this panel    Variant of unknown significance--a variation in one of the genes was identified, but it is unclear how this impacts cancer risk in the family    Advantages and Disadvantages   There are advantages and disadvantages to genetic testing.    Advantages    May clarify your cancer risk    Can help you make medical decisions    May explain the cancers in your family    May give useful information to your family members (if you share your results)    Disadvantages    Possible negative emotional impact of learning about inherited cancer risk    Uncertainty in interpreting a negative test result in some situations    Possible genetic discrimination concerns (see below)    Genetic Information Nondiscrimination Act (JOY)  JOY is a federal law that protects individuals from health insurance or employment discrimination based on a genetic test result alone.  Although rare, there are currently no legal discrimination protections in terms of life insurance, long term care, or disability insurances.  Visit the National Human Genome Research [place] website to learn more.    Reducing Cancer Risk  All of the genes described above have nationally recognized cancer screening guidelines that would be recommended for individuals who test positive.  In addition to increased cancer screening, surgeries may be offered or recommended to reduce cancer risk.  Recommendations are based upon an individuals genetic test result as well as their personal and family history of cancer.    Questions to Think About Regarding Genetic Testing:    What  effect will the test result have on me and my relationship with my family members if I have an inherited gene mutation?  If I dont have a gene mutation?    Should I share my test results, and how will my family react to this news, which may also affect them?    Are my children ready to learn new information that may one day affect their own health?    Hereditary Cancer Resources    FORCE: Facing Our Risk of Cancer Empowered facingourrisk.org   Bright Pink bebrightpink.org   Li-Fraumeni Syndrome Association lfsassociation.org   PTEN World PTENworld.com   No stomach for cancer, Inc. nostomachforcancer.org   Stomach cancer relief network Scrnet.org   Collaborative Group of the Americas on Inherited Colorectal Cancer (CGA) cgaicc.com    Cancer Care cancercare.org   American Cancer Society (ACS) cancer.org   National Cancer [place] (NCI) cancer.gov     Please call us if you have any questions or concerns.   Cancer Risk Management Program  q Jagdish Turner, MS, [facility] [phone]  q Bell Bang, MS, [facility] [phone]  q Cecilia James, MS, [facility] [phone]  q Teressa Brown, MS, [facility] [phone]  q Jadyn Carbajal, MS, [facility] [phone]  q Wu Johns, MS, [facility] [phone]  q Mildred Nascimento, MS, [facility] [phone]      References  1. Simone Ramon PDP, Humberto S, Ravindra SPRAGUE, Tyrel JE, Elo JL, Kalyaniman N, Nikita H, Yeny O, Klever A, Gudelia B, Jus P, Caprice S, Delma DM, Tello N, Toro E, Crow H, Quinn E, Gamainski J, Gronricardo J, Sukhdev B, Aria H, Idrislabee S, Ada H, Em H, Edgardo K, Luiza OP. Average risks of breast and ovarian cancer associated with BRCA1 or BRCA2 mutations detected in case series unselected for family history: a combined analysis of 222 studies. Am J Hum Paige. 2003;72:1117-30.  2. Paul Grecoy M, Joanne MAXWELL.  BRCA1 and BRCA2 Hereditary Breast and Ovarian Cancer. Gene Reviews online. 2013.  3. David YC, Rosa S, Aurora G, Shyann S. Breast cancer risk among male BRCA1 and  BRCA2 mutation carriers. J Natl Cancer Inst. 2007;99:1811-4.  4. Vince HILTON, Margot I, Nahun J, Otoniel E, Delfina ER, Jeff F. Risk of breast cancer in male BRCA2 carriers. J Med Paige. 2010;47:710-1.  5. National Comprehensive Cancer Network. Clinical practice guidelines in oncology, colorectal cancer screening. Available online (registration required). 2015.  6. Hartley MH, Gifty J, Joo J, Sylvia RAMESH, Jadyn MS, Julianne C. Lifetime cancer risks in individuals with germline PTEN mutations. Clin Cancer Res. 2012;18:400-7.  7. Pilarski R. Cowden Syndrome: A Critical Review of the Clinical Literature. J Paige . 2009:18:13-27.  8. Floridalma RASMUSSEN, Jaun D, Andrew S, Susan P, Sonia T, Brooklynn M, Ovi B, Miriam H, Hair R, Michell K, Milady L, Vince HILTON, Delma D, Yandel DF, Luanne MR, The Breast Cancer Susceptibility Collaboration (UK) & Fabio SIMMONS. TRAVIS mutations that cause ataxia-telangiectasia are breast cancer susceptibility alleles. Nature Genetics. 2006;38:873-875  9. Gilbert N , Coty Y, Shruthi J, Daphne L, Tamara GM , Paco ML, Gallinger S, Johnson AG, Syngal S, Lynsey ML, Chun J , Violette R, Shayan SZ, Estina JR, Yuri VE, Bisi M, Vogelstein B, Zaida N, Jeredn RH, Tiffany KW, and Miner AP. TRAVIS mutations in patients with hereditary pancreatic cancer. Cancer Discover. 2012;2:41-46  10. Gladis POSADAS, et al. Breast-Cancer Risk in Families with Mutations in PALB2. NEJM. 2014; 371(6):497-506.  11. CHEK2 Breast Cancer Case-Control Consortium. CHEK2*1100delC and susceptibility to breast cancer: A collaborative analysis involving 10,860 breast cancer cases and 9,065 controls from 10 studies. Am J Hum Paige, 74 (2004), pp. 8746-3397  12. Carisa T, Janee S, Mary K, et al. Spectrum of Mutations in BRCA1, BRCA2, CHEK2, and TP53 in Families at High Risk of Breast Cancer. JAIRO. 2006;295(12):8076-2010.   13. Ailyn RITTER, Kimani PRESLEY, Joshua RASMUSSEN, et al. Risk of breast cancer in women with a  CHEK2 mutation with and without a family history of breast cancer. J Clin Oncol. 2011;29:0343-7964.  14. Randall H, Neema E, Shaara SJ, et al. Contribution of germline mutations in the RAD51B, RAD51C, and RAD51D genes to ovarian cancer in the population. J Clin Oncol. 2015;33(26):5526-2348. Doi:10.1200/JCO.2015.61.2408.  15. Avila T, Isak MERINO, Susana P, et al. Mutations in BRIP1 confer high risk of ovarian cancer. Arlin Paige. 2011;43(11):6558-8755. doi:10.1038/ng.955.

## 2021-07-04 NOTE — ADDENDUM NOTE
Addendum Note by Sigrid Ramirez MD at 6/17/2021  9:20 AM     Author: Sigrdi Ramirez MD Service: -- Author Type: Physician    Filed: 6/25/2021  9:00 AM Date of Service: 6/17/2021  9:20 AM Status: Signed    : Sigrid Ramirez MD (Physician)    Encounter addended by: Sigrid Ramirez MD on: 6/25/2021  9:00 AM      Actions taken: Clinical Note Signed

## 2021-07-05 ENCOUNTER — COMMUNICATION - HEALTHEAST (OUTPATIENT)
Dept: ONCOLOGY | Facility: HOSPITAL | Age: 41
End: 2021-07-05

## 2021-07-05 DIAGNOSIS — Z80.3 FAMILY HISTORY OF MALIGNANT NEOPLASM OF BREAST: ICD-10-CM

## 2021-07-05 DIAGNOSIS — Z17.0 MALIGNANT NEOPLASM OF CENTRAL PORTION OF LEFT BREAST IN FEMALE, ESTROGEN RECEPTOR POSITIVE (H): ICD-10-CM

## 2021-07-05 DIAGNOSIS — Z71.83 ENCOUNTER FOR NONPROCREATIVE GENETIC COUNSELING: ICD-10-CM

## 2021-07-05 DIAGNOSIS — Z80.0 FAMILY HISTORY OF COLON CANCER: ICD-10-CM

## 2021-07-05 DIAGNOSIS — Z80.0 FAMILY HISTORY OF PANCREATIC CANCER: ICD-10-CM

## 2021-07-05 DIAGNOSIS — C50.112 MALIGNANT NEOPLASM OF CENTRAL PORTION OF LEFT BREAST IN FEMALE, ESTROGEN RECEPTOR POSITIVE (H): ICD-10-CM

## 2021-07-05 NOTE — TELEPHONE ENCOUNTER
Telephone Encounter by Jadyn Carbajal, Genetic Counselor at 7/5/2021  9:58 AM     Author: Jadyn Carbajal, Genetic Counselor Service: -- Author Type: Genetic Counselor    Filed: 7/5/2021 10:38 AM Encounter Date: 7/5/2021 Status: Signed    : Jadyn Carbajal, Genetic Counselor (Genetic Counselor)       7/5/2021    Referring Provider: Dr. Ramirez    Presenting Information:  I spoke to Antonella by phone today to discuss her genetic testing results. Her blood was drawn on 6/27/2021. The Common Hereditary Cancers panel was ordered  from [facility]. This testing was done because of Antonella's personal and family history of breast cancer and family history of pancreatic and colon cancer. We discussed that we initially planned on reviewing her initial BRCA STAT panel results first. However, I explained that in some cases, the laboratory reports the full results out with the STAT results if they are available. This was the case for Antonella's results, so full results were reviewed today.       Genetic Testing Result: NEGATIVE  Antonella is negative for mutations in APC, TRAVIS, AXIN2, BARD1, BMPR1A, BRCA1, BRCA2, BRIP1, CDH1, CDK4, CDKN2A, CHEK2, CTNNA1, DICER1, EPCAM, GREM1, HOXB13, KIT, MEN1, MLH1, MSH2, MSH3, MSH6, MUTYH, NBN, NF1, NTHL1, PALB2, PDGFRA, PMS2, POLD1, POLE, PTEN, RAD50, RAD51C, RAD51D, SDHA, SDHB, SDHC, SDHD, SMAD4, SMARCA4, STK11, TP53, TSC1, TSC2, and VHL. No mutations were found in any of the 47 genes analyzed. This test involved sequencing and deletion/duplication analysis of all genes with the exceptions of EPCAM and GREM1 (deletions/duplications only) and SDHA (sequencing only).    Testing did not detect an identifiable mutation associated with Hereditary Breast and Ovarian Cancer syndrome (BRCA1, BRCA2), Ludwig syndrome (MLH1, MSH2, MSH6, PMS2, EPCAM), Familial Adenomatous Polyposis (APC), Hereditary Diffuse Gastric Cancer (CDH1), Cowden syndrome (PTEN), Li Fraumeni syndrome (TP53), Peutz-Jeghers syndrome  "(STK11), Familial Atypical Multiple Mole Melanoma syndrome (CDK4, CDKN2A), Juvenile Polyposis syndrome (BMPR1A, SMAD4), MUTYH Associated Polyposis (MUTYH), Tuberous Sclerosis complex (TSC1, TSC2), Neurofibromatosis type 1 (NF1), Multiple Endocrine Neoplasia type 1 (MEN1), Hereditary Paraganglioma and Pheochromocytoma (SDHA, SDHB, SDHC, SDHD), von Hippel-Lindau (VHL), or Neurofibromatosis type 1 (NF1).    A copy of the test report can be found in the Media tab and named \"Genetics [facility]\". The report is scanned in as a linked document.    Interpretation:  We discussed several different interpretations of this negative test result.    1. One explanation may be that there is a different gene or combination of genes and environment that are associated with the cancers in Antonella and/or her relatives.    2. It is possible that her other relatives with breast or gastrointestinal cancers did have a mutation in one of the genes Antonella was tested for, and she did not inherit it.  3. There is also a small possibility that there is a mutation in one of these genes, and we could not find it with our current testing methods.       Screening:  Based on this negative test result, it is important for Antonella and her relatives to refer back to the family history for appropriate cancer screening.      Antonella should continue to follow her oncology teams' recommendations for the treatment and follow-up for her breast cancer. Given her negative genetic testing results, there is no genetic reason for Antonella to need to consider further breast surgery at this time. We did discuss that these negative genetic testing results do not eliminate a genetic risk factor all together as knowledge and technology continue to expand over time.     Julia close female relatives remain at increased risk for breast cancer given their family history. Breast cancer screening is generally recommended to begin approximately 10 years younger than the earliest age of " breast cancer diagnosis in the family, or at age 40, whichever comes first. In this family, screening may begin at age 31 given Antonella's diagnosis at age 40. Breast screening options should be discussed with an individual's primary care provider and a genetic counselor, to determine at what age to begin screening, what screening is appropriate, and if additional screening (such as breast MRI) is necessary based on personal/family history factors.    We discussed that Antonella likely has some increased risk for pancreatic cancer given her mother's history, but routine screening is typically not recommended.  Antonella is encouraged to discuss this history with her care providers.    Per National Comprehensive Cancer Network (NCCN) guidelines, individuals with a second-degree relative with colon cancer diagnosed at any age should start colonoscopy at age 45-50, and should be repeated every 10 years, or per colonoscopy findings. Per the American Cancer Society, colon screening in the average population should begin at age 45. The age at which to begin colon screening should be discussed with Antonella's/Antonella's relatives' physicians, who should make final screening recommendations.   Other population cancer screening options, such as those recommended by the American Cancer Society and the National Comprehensive Cancer Network (NCCN), are also appropriate for Antonella and her family. These screening recommendations may change if there are changes to Antonella's personal and/or family history. Final screening recommendations should be made by each individual's managing physician.    Inheritance:  We reviewed the autosomal dominant inheritance of mutations in these 47 genes.  We discussed that Antonella cannot/did not pass on an identifiable mutation in these genes to her children based on this test result.  Mutations in these genes do not skip generations.      Additional Testing Considerations:  Although Antonella's genetic testing result was negative,  other relatives may still carry a gene mutation associated with breast and/or gastrointestinal cancer. Genetic counseling is recommended for her bothers and maternal aunts to discuss genetic testing options. If any of these relatives do pursue genetic testing, Antonella is encouraged to contact me so that we may review the impact of their test results on her.     Summary:  We do not have an explanation for Antonella's personal and family history of cancer.  Because of that, it is important that she continue with cancer screening based on her personal and family history as discussed above.    Genetic testing is rapidly advancing, and new cancer susceptibility genes will most likely be identified in the future.  Therefore, I encouraged Antonella to contact me annually or if there are changes in her personal or family history.  This may change how we assess her cancer risk, screening, and the testing we would offer.    Plan:  1. A copy of the test results will be mailed to Antonella.  2. She plans to follow-up with Dr. Mayer for radiation therapy.  3. She should contact me annually, or sooner if her family history changes.    If Antonella has any further questions, I encouraged her to contact me at [phone].    Time spent on the phone: 10 minutes.    Jadyn Carbajal MS, [facility] [place]  Licensed Genetic Counselor  [facility]  [phone]

## 2021-07-06 VITALS
BODY MASS INDEX: 20.67 KG/M2 | HEIGHT: 69 IN | BODY MASS INDEX: 20.67 KG/M2 | WEIGHT: 140 LBS | HEIGHT: 69 IN | BODY MASS INDEX: 20.67 KG/M2 | BODY MASS INDEX: 20.67 KG/M2 | WEIGHT: 140 LBS

## 2021-07-06 VITALS — WEIGHT: 140 LBS | BODY MASS INDEX: 20.73 KG/M2 | HEIGHT: 69 IN

## 2021-07-06 VITALS — BODY MASS INDEX: 20.67 KG/M2 | WEIGHT: 140 LBS | BODY MASS INDEX: 20.67 KG/M2 | HEIGHT: 69 IN

## 2021-07-06 VITALS — WEIGHT: 139 LBS | HEIGHT: 69 IN | BODY MASS INDEX: 20.59 KG/M2

## 2021-07-12 ENCOUNTER — ALLIED HEALTH/NURSE VISIT (OUTPATIENT)
Dept: RADIATION ONCOLOGY | Facility: HOSPITAL | Age: 41
End: 2021-07-12
Attending: RADIOLOGY
Payer: COMMERCIAL

## 2021-07-12 DIAGNOSIS — Z17.0 MALIGNANT NEOPLASM OF CENTRAL PORTION OF LEFT BREAST IN FEMALE, ESTROGEN RECEPTOR POSITIVE (H): Primary | ICD-10-CM

## 2021-07-12 DIAGNOSIS — C50.112 MALIGNANT NEOPLASM OF CENTRAL PORTION OF LEFT BREAST IN FEMALE, ESTROGEN RECEPTOR POSITIVE (H): Primary | ICD-10-CM

## 2021-07-12 PROCEDURE — 77334 RADIATION TREATMENT AID(S): CPT | Mod: 26 | Performed by: RADIOLOGY

## 2021-07-12 PROCEDURE — 77334 RADIATION TREATMENT AID(S): CPT | Mod: 26

## 2021-07-12 PROCEDURE — 77290 THER RAD SIMULAJ FIELD CPLX: CPT | Mod: 26 | Performed by: RADIOLOGY

## 2021-07-12 PROCEDURE — 77290 THER RAD SIMULAJ FIELD CPLX: CPT | Mod: 26

## 2021-07-12 PROCEDURE — 77263 THER RADIOLOGY TX PLNG CPLX: CPT | Performed by: RADIOLOGY

## 2021-07-12 NOTE — PROCEDURES
SIMULATION NOTE:       DIAGNOSIS: Left breast cancer, pathologic stage T1cN0(sn) M0, ER/CO positive, HER-2 negative, Oncotype DX score 11, status post lumpectomy and sentinel resection with negative margin.     INDICATION:  Postoperative radiation therapy is recommended for patient as second part of the breast preservation protocol to further reduce the likelihood of cancer recurrence.    CONSENT:  The possible risks and the side effects of radiation therapy have been discussed with patient in detail and at great length.  Questions are answered to patient's satisfaction.  Written consent was obtained.    SIMULATION:  The patient is in a supine position with a wing breast board to help keep the same position during the daily radiation therapy.  Tentative isocenter is set up in the center of the thoracic region.  We will acquire CT information to help us to better locate target and design radiation therapy field.    We are also going to obtain 4-D CT and use respiratory gating (or breath holdidng) technique to help us to reduce the radiation dose to the heart and lung.        BLOCKS:  Custom blocks will be drawn to minimize radiation to normal tissues and to protect normal organs including, but not limited to, lungs, heart, liver, bone and soft tissue.    DOSAGE:  I plan to give her radiation therapy at 265 cGy each fraction to a total of 4240 cGy in 16 treatments targeted to the left breast only.  An additional 1000 cGy in 4 fractions will be given to the primary tumor bed using an electron. I will consider to use 3D conformer technique to help us better locate target and to protect normal tissue.          Kim Mayer MD, PhD  Department of Radiation Oncology   [facility]  Tel: [phone]  Page: [phone]    [facility]  [address] [place] [address]     [place] [id number] [facility]   [place] [address]

## 2021-07-12 NOTE — PROCEDURES
Clinical Treatment Planning Note    The complex radiotherapy planning will be completed for the patient to plan the treatment for her breast cancer.  The patient had a planning CT earlier today for planning.  The treatment aids were used for planning, including headrest and Wing Board to help keep the same position during the daily radiation therapy.  The therapy planning is necessary to reduce radiotherapy dose to the normal critical organs which are not possible with simple treatment.  In addition, dose to the target and the critical structures requires three-dimensional analysis of the isodose distribution.  The planning will be done to reduce dose to the lungs, spinal cord, liver, and heart.     I will contour the clinical tumor volume  CTV , with expanded volume of planning treatment volume  PTV  on the treatment planning system.  The critical structures will be outlined, including spinal cord, lungs, heart, liver, bone and soft tissue.     Treatment planning will be done on the computer treatment planning system.  The tangential field will be used to achieve optimal coverage of the target volume.  Dose distribution to the above critical structures will be reviewed.  Isodose distribution along with the X, Y, Z plan will also be reviewed.  Custom blocking will be used to shield normal structures.  The beam s eye views will be reviewed and the digital reconstructed image will be reviewed on the planning software.      The patient will receive 4240 cGy in 16 treatments targeted to the left chest/breast region using 6-MV or 10-MV photons.  An additional 1000 cGy in 4 fractions will be planned to give to the primary tumor bed using electron.        Kim Mayer MD, PhD  Department of Radiation Oncology   [facility]  Tel: [phone]  Page: [phone]    [facility]  [address] [place] [address]     [place] [id number] [facility] [place] [address]

## 2021-07-20 ENCOUNTER — APPOINTMENT (OUTPATIENT)
Dept: RADIATION ONCOLOGY | Facility: HOSPITAL | Age: 41
End: 2021-07-20
Attending: RADIOLOGY
Payer: COMMERCIAL

## 2021-07-20 PROCEDURE — 77300 RADIATION THERAPY DOSE PLAN: CPT | Mod: 26 | Performed by: RADIOLOGY

## 2021-07-20 PROCEDURE — 77300 RADIATION THERAPY DOSE PLAN: CPT | Mod: 26

## 2021-07-20 PROCEDURE — 77295 3-D RADIOTHERAPY PLAN: CPT

## 2021-07-20 PROCEDURE — 77295 3-D RADIOTHERAPY PLAN: CPT | Mod: 26 | Performed by: RADIOLOGY

## 2021-07-20 PROCEDURE — 77334 RADIATION TREATMENT AID(S): CPT | Mod: 26 | Performed by: RADIOLOGY

## 2021-07-20 PROCEDURE — 77334 RADIATION TREATMENT AID(S): CPT | Mod: 26

## 2021-07-22 NOTE — LETTER
Letter by Jadyn Carbajal, Genetic Counselor at      Author: Jadyn Carbajal, Genetic Counselor Service: -- Author Type: --    Filed:  Encounter Date: 7/5/2021 Status: (Other)         Antonella Jamison  [address] [address]      July 5, 2021      Dear Ms. Jamison,    It was a pleasure speaking with you on the phone on 7/5/2021.  Here is a copy of the progress note from our discussion.  If you have any additional questions, please feel free to call.    Referring Provider: Dr. Ramirez    Presenting Information:  I spoke to Antonella by phone today to discuss her genetic testing results. Her blood was drawn on 6/17/2021. The Common Hereditary Cancers panel was ordered  from [facility]. This testing was done because of Antonella's personal and family history of breast cancer and family history of pancreatic and colon cancer. We discussed that we initially planned on reviewing her initial BRCA STAT panel results first. However, I explained that in some cases, the laboratory reports the full results out with the STAT results if they are available. This was the case for Antonella's results, so full results were reviewed today.     Genetic Testing Result: NEGATIVE  Antonella is negative for mutations in APC, TRAVIS, AXIN2, BARD1, BMPR1A, BRCA1, BRCA2, BRIP1, CDH1, CDK4, CDKN2A, CHEK2, CTNNA1, DICER1, EPCAM, GREM1, HOXB13, KIT, MEN1, MLH1, MSH2, MSH3, MSH6, MUTYH, NBN, NF1, NTHL1, PALB2, PDGFRA, PMS2, POLD1, POLE, PTEN, RAD50, RAD51C, RAD51D, SDHA, SDHB, SDHC, SDHD, SMAD4, SMARCA4, STK11, TP53, TSC1, TSC2, and VHL. No mutations were found in any of the 47 genes analyzed. This test involved sequencing and deletion/duplication analysis of all genes with the exceptions of EPCAM and GREM1 (deletions/duplications only) and SDHA (sequencing only).    Testing did not detect an identifiable mutation associated with Hereditary Breast and Ovarian Cancer syndrome (BRCA1, BRCA2), Ludwig syndrome (MLH1, MSH2, MSH6, PMS2, EPCAM), Familial  "Adenomatous Polyposis (APC), Hereditary Diffuse Gastric Cancer (CDH1), Cowden syndrome (PTEN), Li Fraumeni syndrome (TP53), Peutz-Jeghers syndrome (STK11), Familial Atypical Multiple Mole Melanoma syndrome (CDK4, CDKN2A), Juvenile Polyposis syndrome (BMPR1A, SMAD4), MUTYH Associated Polyposis (MUTYH), Tuberous Sclerosis complex (TSC1, TSC2), Neurofibromatosis type 1 (NF1), Multiple Endocrine Neoplasia type 1 (MEN1), Hereditary Paraganglioma and Pheochromocytoma (SDHA, SDHB, SDHC, SDHD), von Hippel-Lindau (VHL), or Neurofibromatosis type 1 (NF1).    A copy of the test report can be found in the Media tab and named \"Genetics Scan[facility]\". The report is scanned in as a linked document.    Interpretation:  We discussed several different interpretations of this negative test result.    1. One explanation may be that there is a different gene or combination of genes and environment that are associated with the cancers in Antonella and/or her relatives.    2. It is possible that her other relatives with breast or gastrointestinal cancers did have a mutation in one of the genes Antonella was tested for, and she did not inherit it.  3. There is also a small possibility that there is a mutation in one of these genes, and we could not find it with our current testing methods.       Screening:  Based on this negative test result, it is important for Antonella and her relatives to refer back to the family history for appropriate cancer screening.      Antonella should continue to follow her oncology teams' recommendations for the treatment and follow-up for her breast cancer. Given her negative genetic testing results, there is no genetic reason for Antonella to need to consider further breast surgery at this time. We did discuss that these negative genetic testing results do not eliminate a genetic risk factor all together as knowledge and technology continue to expand over time.     Julia close female relatives remain at increased risk for breast " cancer given their family history. Breast cancer screening is generally recommended to begin approximately 10 years younger than the earliest age of breast cancer diagnosis in the family, or at age 40, whichever comes first. In this family, screening may begin at age 31 given Antonella's diagnosis at age 40. Breast screening options should be discussed with an individual's primary care provider and a genetic counselor, to determine at what age to begin screening, what screening is appropriate, and if additional screening (such as breast MRI) is necessary based on personal/family history factors.    We discussed that Antonella likely has some increased risk for pancreatic cancer given her mother's history, but routine screening is typically not recommended.  Antonella is encouraged to discuss this history with her care providers.    Per National Comprehensive Cancer Network (NCCN) guidelines, individuals with a second-degree relative with colon cancer diagnosed at any age should start colonoscopy at age 45-50, and should be repeated every 10 years, or per colonoscopy findings. Per the American Cancer Society, colon screening in the average population should begin at age 45. The age at which to begin colon screening should be discussed with Antonella's/Antonella's relatives' physicians, who should make final screening recommendations.   Other population cancer screening options, such as those recommended by the American Cancer Society and the National Comprehensive Cancer Network (NCCN), are also appropriate for Antonella and her family. These screening recommendations may change if there are changes to Antonella's personal and/or family history. Final screening recommendations should be made by each individual's managing physician.    Inheritance:  We reviewed the autosomal dominant inheritance of mutations in these 47 genes.  We discussed that Antonella cannot/did not pass on an identifiable mutation in these genes to her children based on this test  result.  Mutations in these genes do not skip generations.      Additional Testing Considerations:  Although Antonella's genetic testing result was negative, other relatives may still carry a gene mutation associated with breast and/or gastrointestinal cancer. Genetic counseling is recommended for her bothers and maternal aunts to discuss genetic testing options. If any of these relatives do pursue genetic testing, Antonella is encouraged to contact me so that we may review the impact of their test results on her.     Summary:  We do not have an explanation for Antonella's personal and family history of cancer.  Because of that, it is important that she continue with cancer screening based on her personal and family history as discussed above.    Genetic testing is rapidly advancing, and new cancer susceptibility genes will most likely be identified in the future.  Therefore, I encouraged Antonella to contact me annually or if there are changes in her personal or family history.  This may change how we assess her cancer risk, screening, and the testing we would offer.    Plan:  1. A copy of the test results will be mailed to Antonella.  2. She plans to follow-up with Dr. Mayer for radiation therapy.  3. She should contact me annually, or sooner if her family history changes.    If Antonella has any further questions, I encouraged her to contact me at [phone].    Jadyn Carbajal MS, [facility]  Licensed Genetic Counselor  [facility]  [phone]      Negative Genetic Test Result    Genetic Testing  You had a blood test that looked at the genetic information in one or more genes associated with increased cancer risk.  The testing looked for any harmful changes that would stop this particular gene from working like it should. If an individual does not have any harmful changes or variants of unknown significance found from their blood test, their genetic test result is reported as negative.       Results  The  genetic test did not identify any pathogenic (harmful) changes in the genes that were tested. There are several possible explanations for a negative test result. Without knowing the gene mutation in your family, the cause of the cancer in you or your relatives is still unknown. Your genetic counselor can help interpret the result for you and your relatives. In this case, there are several reasons that may explain the negative test result:    There may be a gene mutation in the family that you did not inherit.     You may have a gene mutation in a different gene that was not included in the test, or has not yet been discovered.     The cancers in you or your family may be due to a combination of genetic factors and environment (multifactorial/familial).    The cancers in you or your family may be sporadic/random cancers.    There is very small chance that a mutation was not found by current testing methods.  As testing technology evolves over time, it may still be possible to identify a mutation in a gene that was not found on this test.    It is important to note which genes were included in your test. A list of these genes can be found on your test result.    Screening Recommendations  Due to this negative test result, cancer screening recommendations should be based on your personal and family history. This may include increased cancer screening for you and/or your family members. Your genetic counselor and health care provider can help make appropriate recommendations.      Please call us if you have any questions or concerns.   [facility] Jadyn Carbajal MS, [facility]  [phone]

## 2021-07-24 ENCOUNTER — LAB (OUTPATIENT)
Dept: FAMILY MEDICINE | Facility: CLINIC | Age: 41
End: 2021-07-24
Attending: RADIOLOGY
Payer: COMMERCIAL

## 2021-07-24 DIAGNOSIS — Z17.0 MALIGNANT NEOPLASM OF CENTRAL PORTION OF LEFT BREAST IN FEMALE, ESTROGEN RECEPTOR POSITIVE (H): ICD-10-CM

## 2021-07-24 DIAGNOSIS — C50.112 MALIGNANT NEOPLASM OF CENTRAL PORTION OF LEFT BREAST IN FEMALE, ESTROGEN RECEPTOR POSITIVE (H): ICD-10-CM

## 2021-07-24 PROCEDURE — U0005 INFEC AGEN DETEC AMPLI PROBE: HCPCS

## 2021-07-24 PROCEDURE — U0003 INFECTIOUS AGENT DETECTION BY NUCLEIC ACID (DNA OR RNA); SEVERE ACUTE RESPIRATORY SYNDROME CORONAVIRUS 2 (SARS-COV-2) (CORONAVIRUS DISEASE [COVID-19]), AMPLIFIED PROBE TECHNIQUE, MAKING USE OF HIGH THROUGHPUT TECHNOLOGIES AS DESCRIBED BY CMS-2020-01-R: HCPCS

## 2021-07-25 LAB — SARS-COV-2 RNA RESP QL NAA+PROBE: NEGATIVE

## 2021-07-26 DIAGNOSIS — Z17.0 MALIGNANT NEOPLASM OF CENTRAL PORTION OF LEFT BREAST IN FEMALE, ESTROGEN RECEPTOR POSITIVE (H): Primary | ICD-10-CM

## 2021-07-26 DIAGNOSIS — C50.112 MALIGNANT NEOPLASM OF CENTRAL PORTION OF LEFT BREAST IN FEMALE, ESTROGEN RECEPTOR POSITIVE (H): Primary | ICD-10-CM

## 2021-07-28 ENCOUNTER — APPOINTMENT (OUTPATIENT)
Dept: RADIATION ONCOLOGY | Facility: CLINIC | Age: 41
End: 2021-07-28
Payer: COMMERCIAL

## 2021-07-28 PROCEDURE — 77387 GUIDANCE FOR RADJ TX DLVR: CPT

## 2021-07-28 PROCEDURE — G6017 INTRAFRACTION TRACK MOTION: HCPCS | Performed by: RADIOLOGY

## 2021-07-28 PROCEDURE — 77280 THER RAD SIMULAJ FIELD SMPL: CPT | Mod: 26

## 2021-07-28 PROCEDURE — 77280 THER RAD SIMULAJ FIELD SMPL: CPT | Mod: 26 | Performed by: RADIOLOGY

## 2021-07-28 PROCEDURE — 77412 RADIATION TX DELIVERY LVL 3: CPT

## 2021-07-29 ENCOUNTER — APPOINTMENT (OUTPATIENT)
Dept: RADIATION ONCOLOGY | Facility: CLINIC | Age: 41
End: 2021-07-29
Attending: RADIOLOGY
Payer: COMMERCIAL

## 2021-07-29 PROCEDURE — 77387 GUIDANCE FOR RADJ TX DLVR: CPT

## 2021-07-29 PROCEDURE — G6017 INTRAFRACTION TRACK MOTION: HCPCS | Performed by: RADIOLOGY

## 2021-07-29 PROCEDURE — 77412 RADIATION TX DELIVERY LVL 3: CPT

## 2021-07-30 ENCOUNTER — APPOINTMENT (OUTPATIENT)
Dept: RADIATION ONCOLOGY | Facility: CLINIC | Age: 41
End: 2021-07-30
Attending: RADIOLOGY
Payer: COMMERCIAL

## 2021-07-30 ENCOUNTER — TELEPHONE (OUTPATIENT)
Dept: RADIATION ONCOLOGY | Facility: HOSPITAL | Age: 41
End: 2021-07-30

## 2021-07-30 PROCEDURE — 77387 GUIDANCE FOR RADJ TX DLVR: CPT

## 2021-07-30 PROCEDURE — 77336 RADIATION PHYSICS CONSULT: CPT

## 2021-07-30 PROCEDURE — 77427 RADIATION TX MANAGEMENT X5: CPT | Performed by: RADIOLOGY

## 2021-07-30 PROCEDURE — 77412 RADIATION TX DELIVERY LVL 3: CPT

## 2021-07-30 NOTE — TELEPHONE ENCOUNTER
Called pt, no answer, LM that she will not get a daily co pay charge that service is lumped together. Informed that if she needs more clarification that our manager, Gwen, could speak with her on Monday or direct her to billing. Informed to call with any further questions or concerns.

## 2021-08-02 ENCOUNTER — APPOINTMENT (OUTPATIENT)
Dept: RADIATION ONCOLOGY | Facility: CLINIC | Age: 41
End: 2021-08-02
Attending: RADIOLOGY
Payer: COMMERCIAL

## 2021-08-02 PROCEDURE — G6017 INTRAFRACTION TRACK MOTION: HCPCS | Performed by: RADIOLOGY

## 2021-08-02 PROCEDURE — 77412 RADIATION TX DELIVERY LVL 3: CPT

## 2021-08-02 PROCEDURE — 77387 GUIDANCE FOR RADJ TX DLVR: CPT

## 2021-08-03 ENCOUNTER — APPOINTMENT (OUTPATIENT)
Dept: RADIATION ONCOLOGY | Facility: CLINIC | Age: 41
End: 2021-08-03
Attending: RADIOLOGY
Payer: COMMERCIAL

## 2021-08-03 ENCOUNTER — OFFICE VISIT (OUTPATIENT)
Dept: RADIATION ONCOLOGY | Facility: CLINIC | Age: 41
End: 2021-08-03
Payer: COMMERCIAL

## 2021-08-03 VITALS
WEIGHT: 134.8 LBS | RESPIRATION RATE: 16 BRPM | BODY MASS INDEX: 19.91 KG/M2 | OXYGEN SATURATION: 100 % | SYSTOLIC BLOOD PRESSURE: 105 MMHG | TEMPERATURE: 98.2 F | DIASTOLIC BLOOD PRESSURE: 60 MMHG | HEART RATE: 67 BPM

## 2021-08-03 DIAGNOSIS — C50.112 MALIGNANT NEOPLASM OF CENTRAL PORTION OF LEFT BREAST IN FEMALE, ESTROGEN RECEPTOR POSITIVE (H): Primary | ICD-10-CM

## 2021-08-03 DIAGNOSIS — Z17.0 MALIGNANT NEOPLASM OF CENTRAL PORTION OF LEFT BREAST IN FEMALE, ESTROGEN RECEPTOR POSITIVE (H): Primary | ICD-10-CM

## 2021-08-03 PROCEDURE — 77412 RADIATION TX DELIVERY LVL 3: CPT

## 2021-08-03 PROCEDURE — 77387 GUIDANCE FOR RADJ TX DLVR: CPT

## 2021-08-03 PROCEDURE — G6017 INTRAFRACTION TRACK MOTION: HCPCS | Performed by: RADIOLOGY

## 2021-08-03 RX ORDER — BIOTIN 5 MG
1 TABLET ORAL
COMMUNITY

## 2021-08-03 RX ORDER — HYDROCODONE BITARTRATE AND ACETAMINOPHEN 5; 325 MG/1; MG/1
TABLET ORAL
COMMUNITY
Start: 2021-06-09 | End: 2021-09-21

## 2021-08-03 RX ORDER — IBUPROFEN 200 MG
200-400 TABLET ORAL
COMMUNITY

## 2021-08-03 RX ORDER — LORATADINE 10 MG/1
TABLET ORAL
COMMUNITY

## 2021-08-03 RX ORDER — ACETAMINOPHEN 325 MG/1
TABLET ORAL
COMMUNITY

## 2021-08-03 NOTE — LETTER
8/3/2021         RE: Antonella Jamison  [address] [facility] [address]        Dear Colleague,    Thank you for referring your patient, Antonella Jamison, to the [facility] RADIATION ONCOLOGY [place]. Please see a copy of my visit note below.      RADIATION ONCOLOGY WEEKLY TREATMENT VISIT NOTE      Assessment / Impression     Malignant neoplasm of central portion of left breast in female, estrogen receptor positive (H) [C50.112, Z17.0]    Tolerating radiation therapy well.  All questions and concerns addressed.    Plan:     Continue radiation treatment as prescribed.    Subjective:      HPI: Antonella Jamison is a 40 year old female with  Malignant neoplasm of central portion of left breast in female, estrogen receptor positive (H) [C50.112, Z17.0]    The following portions of the patient's history were reviewed and updated as appropriate: allergies, current medications, past family history, past medical history, past social history, past surgical history and problem list.    Assessment                  Body Site:  Breast                           Site: Left Bresat  Stereotactic Radiosurgery: No  Concurrent Therapy: No  Today's Dose: 1325  Total Dose for Breast: 5240  Today's Fraction/Total Fraction Breast:   Drainage: 0: Absent                                     Sexuality Alteration                    Emotional Alteration    Copin: Effective  Comfort Alteration   KPS: 90% Can perform normal activity, minor signs of disease  Fatigue (ONS scale): 6: Moderate Fatigue  Pain Location: occasional shooting pains   Nutrition Alteration   Anorexia: 0: None  Nausea: 0: None  Vomitin: None  Weight: 61.1 kg (134 lb 12.8 oz)  Skin Alteration   Skin Sensation: 1:Pruitis  Skin Reaction: 1: Faint erythema or dry desquamation (radiaplex given w/instructions)  AUA Assessment                                           Accompanied by       Objective:     Exam: Examination reviewed no significant  changes.    Vitals:    08/03/21 1518   BP: 105/60   Pulse: 67   Resp: 16   Temp: 98.2  F (36.8  C)   TempSrc: Oral   SpO2: 100%   Weight: 61.1 kg (134 lb 12.8 oz)       Wt Readings from Last 8 Encounters:   08/03/21 61.1 kg (134 lb 12.8 oz)   06/30/21 62.3 kg (137 lb 6.4 oz)   06/14/21 63.5 kg (140 lb)   06/09/21 63.5 kg (140 lb)   06/08/21 63.5 kg (140 lb)   06/12/03 62.4 kg (137 lb 8 oz)       General: Alert and oriented, in no acute distress  Antonella has no Erythema.  Aria chart and setup information reviewed    Kim Mayer MD        Again, thank you for allowing me to participate in the care of your patient.        Sincerely,        Kim Mayer MD

## 2021-08-03 NOTE — PROGRESS NOTES
RADIATION ONCOLOGY WEEKLY TREATMENT VISIT NOTE      Assessment / Impression     Malignant neoplasm of central portion of left breast in female, estrogen receptor positive (H) [C50.112, Z17.0]    Tolerating radiation therapy well.  All questions and concerns addressed.    Plan:     Continue radiation treatment as prescribed.    Subjective:      HPI: Antonella Jamison is a 40 year old female with  Malignant neoplasm of central portion of left breast in female, estrogen receptor positive (H) [C50.112, Z17.0]    The following portions of the patient's history were reviewed and updated as appropriate: allergies, current medications, past family history, past medical history, past social history, past surgical history and problem list.    Assessment                  Body Site:  Breast                           Site: Left Bresat  Stereotactic Radiosurgery: No  Concurrent Therapy: No  Today's Dose: 1325  Total Dose for Breast: 5240  Today's Fraction/Total Fraction Breast:   Drainage: 0: Absent                                     Sexuality Alteration                    Emotional Alteration    Copin: Effective  Comfort Alteration   KPS: 90% Can perform normal activity, minor signs of disease  Fatigue (ONS scale): 6: Moderate Fatigue  Pain Location: occasional shooting pains   Nutrition Alteration   Anorexia: 0: None  Nausea: 0: None  Vomitin: None  Weight: 61.1 kg (134 lb 12.8 oz)  Skin Alteration   Skin Sensation: 1:Pruitis  Skin Reaction: 1: Faint erythema or dry desquamation (radiaplex given w/instructions)  AUA Assessment                                           Accompanied by       Objective:     Exam: Examination reviewed no significant changes.    Vitals:    21 1518   BP: 105/60   Pulse: 67   Resp: 16   Temp: 98.2  F (36.8  C)   TempSrc: Oral   SpO2: 100%   Weight: 61.1 kg (134 lb 12.8 oz)       Wt Readings from Last 8 Encounters:   21 61.1 kg (134 lb 12.8 oz)   21 62.3 kg  (137 lb 6.4 oz)   06/14/21 63.5 kg (140 lb)   06/09/21 63.5 kg (140 lb)   06/08/21 63.5 kg (140 lb)   06/12/03 62.4 kg (137 lb 8 oz)       General: Alert and oriented, in no acute distress  Antonella has no Erythema.  Aria chart and setup information reviewed    Kim Mayer MD

## 2021-08-04 ENCOUNTER — APPOINTMENT (OUTPATIENT)
Dept: RADIATION ONCOLOGY | Facility: CLINIC | Age: 41
End: 2021-08-04
Attending: RADIOLOGY
Payer: COMMERCIAL

## 2021-08-04 PROCEDURE — 77412 RADIATION TX DELIVERY LVL 3: CPT

## 2021-08-04 PROCEDURE — G6017 INTRAFRACTION TRACK MOTION: HCPCS | Performed by: RADIOLOGY

## 2021-08-04 PROCEDURE — 77387 GUIDANCE FOR RADJ TX DLVR: CPT

## 2021-08-05 ENCOUNTER — APPOINTMENT (OUTPATIENT)
Dept: RADIATION ONCOLOGY | Facility: CLINIC | Age: 41
End: 2021-08-05
Attending: RADIOLOGY
Payer: COMMERCIAL

## 2021-08-05 PROCEDURE — G6017 INTRAFRACTION TRACK MOTION: HCPCS | Performed by: STUDENT IN AN ORGANIZED HEALTH CARE EDUCATION/TRAINING PROGRAM

## 2021-08-05 PROCEDURE — 77387 GUIDANCE FOR RADJ TX DLVR: CPT

## 2021-08-05 PROCEDURE — 77412 RADIATION TX DELIVERY LVL 3: CPT

## 2021-08-06 ENCOUNTER — APPOINTMENT (OUTPATIENT)
Dept: RADIATION ONCOLOGY | Facility: CLINIC | Age: 41
End: 2021-08-06
Attending: RADIOLOGY
Payer: COMMERCIAL

## 2021-08-06 PROCEDURE — 77387 GUIDANCE FOR RADJ TX DLVR: CPT

## 2021-08-06 PROCEDURE — 77412 RADIATION TX DELIVERY LVL 3: CPT

## 2021-08-06 PROCEDURE — G6017 INTRAFRACTION TRACK MOTION: HCPCS | Performed by: STUDENT IN AN ORGANIZED HEALTH CARE EDUCATION/TRAINING PROGRAM

## 2021-08-09 ENCOUNTER — APPOINTMENT (OUTPATIENT)
Dept: RADIATION ONCOLOGY | Facility: CLINIC | Age: 41
End: 2021-08-09
Attending: RADIOLOGY
Payer: COMMERCIAL

## 2021-08-09 PROCEDURE — 77387 GUIDANCE FOR RADJ TX DLVR: CPT

## 2021-08-09 PROCEDURE — G6017 INTRAFRACTION TRACK MOTION: HCPCS | Performed by: STUDENT IN AN ORGANIZED HEALTH CARE EDUCATION/TRAINING PROGRAM

## 2021-08-09 PROCEDURE — 77412 RADIATION TX DELIVERY LVL 3: CPT

## 2021-08-10 ENCOUNTER — APPOINTMENT (OUTPATIENT)
Dept: RADIATION ONCOLOGY | Facility: CLINIC | Age: 41
End: 2021-08-10
Attending: RADIOLOGY
Payer: COMMERCIAL

## 2021-08-10 ENCOUNTER — OFFICE VISIT (OUTPATIENT)
Dept: RADIATION ONCOLOGY | Facility: CLINIC | Age: 41
End: 2021-08-10
Payer: COMMERCIAL

## 2021-08-10 VITALS
HEART RATE: 62 BPM | SYSTOLIC BLOOD PRESSURE: 105 MMHG | BODY MASS INDEX: 19.91 KG/M2 | WEIGHT: 134.8 LBS | DIASTOLIC BLOOD PRESSURE: 64 MMHG | TEMPERATURE: 98.3 F | RESPIRATION RATE: 16 BRPM | OXYGEN SATURATION: 100 %

## 2021-08-10 DIAGNOSIS — Z17.0 MALIGNANT NEOPLASM OF CENTRAL PORTION OF LEFT BREAST IN FEMALE, ESTROGEN RECEPTOR POSITIVE (H): Primary | ICD-10-CM

## 2021-08-10 DIAGNOSIS — C50.112 MALIGNANT NEOPLASM OF CENTRAL PORTION OF LEFT BREAST IN FEMALE, ESTROGEN RECEPTOR POSITIVE (H): Primary | ICD-10-CM

## 2021-08-10 PROCEDURE — 77387 GUIDANCE FOR RADJ TX DLVR: CPT

## 2021-08-10 PROCEDURE — 77427 RADIATION TX MANAGEMENT X5: CPT | Performed by: RADIOLOGY

## 2021-08-10 PROCEDURE — 77336 RADIATION PHYSICS CONSULT: CPT

## 2021-08-10 PROCEDURE — 77412 RADIATION TX DELIVERY LVL 3: CPT

## 2021-08-10 NOTE — LETTER
8/10/2021         RE: Antonella Jamison  [address] [address]        Dear Colleague,    Thank you for referring your patient, Antonella Jamison, to the [facility] RADIATION ONCOLOGY [place]. Please see a copy of my visit note below.      RADIATION ONCOLOGY WEEKLY TREATMENT VISIT NOTE      Assessment / Impression     Malignant neoplasm of central portion of left breast in female, estrogen receptor positive (H) [C50.112, Z17.0]     Tolerating radiation therapy well.  All questions and concerns addressed.    Plan:     Continue radiation treatment as prescribed.    Discussed with patient about skin care.    Subjective:      HPI: Antonella Jamison is a 40 year old female with  Malignant neoplasm of central portion of left breast in female, estrogen receptor positive (H) [C50.112, Z17.0]    The following portions of the patient's history were reviewed and updated as appropriate: allergies, current medications, past family history, past medical history, past social history, past surgical history and problem list.    Assessment                  Body Site:  Breast                           Site: Left Breast  Stereotactic Radiosurgery: No  Concurrent Therapy: No  Today's Dose: 2650  Total Dose for Breast: 5240  Today's Fraction/Total Fraction Breast: 10/20  Drainage: 0: Absent                                     Sexuality Alteration                    Emotional Alteration    Copin: Effective  Comfort Alteration   KPS: 90% Can perform normal activity, minor signs of disease  Fatigue (ONS scale): 6: Moderate Fatigue  Pain Location: occasional shooting pain   Nutrition Alteration   Anorexia: 0: None  Nausea: 0: None  Vomitin: None  Weight: 61.1 kg (134 lb 12.8 oz)  Skin Alteration   Skin Sensation: 1:Pruitis  Skin Reaction: 2: Bright erythema (aquaphor given for nipple tenderness)  AUA Assessment                                           Accompanied by       Objective:     Exam: mild  Erythema.    Vitals:    08/10/21 1113   BP: 105/64   Pulse: 62   Resp: 16   Temp: 98.3  F (36.8  C)   TempSrc: Oral   SpO2: 100%   Weight: 61.1 kg (134 lb 12.8 oz)       Wt Readings from Last 8 Encounters:   08/10/21 61.1 kg (134 lb 12.8 oz)   08/03/21 61.1 kg (134 lb 12.8 oz)   06/30/21 62.3 kg (137 lb 6.4 oz)   06/14/21 63.5 kg (140 lb)   06/09/21 63.5 kg (140 lb)   06/08/21 63.5 kg (140 lb)   06/12/03 62.4 kg (137 lb 8 oz)       General: Alert and oriented, in no acute distress  Antonella has mild Erythema.  Aria chart and setup information reviewed    Kim Mayer MD        Again, thank you for allowing me to participate in the care of your patient.        Sincerely,        Kim Mayer MD

## 2021-08-10 NOTE — PROGRESS NOTES
RADIATION ONCOLOGY WEEKLY TREATMENT VISIT NOTE      Assessment / Impression     Malignant neoplasm of central portion of left breast in female, estrogen receptor positive (H) [C50.112, Z17.0]     Tolerating radiation therapy well.  All questions and concerns addressed.    Plan:     Continue radiation treatment as prescribed.    Discussed with patient about skin care.    Subjective:      HPI: Antonella Jamison is a 40 year old female with  Malignant neoplasm of central portion of left breast in female, estrogen receptor positive (H) [C50.112, Z17.0]    The following portions of the patient's history were reviewed and updated as appropriate: allergies, current medications, past family history, past medical history, past social history, past surgical history and problem list.    Assessment                  Body Site:  Breast                           Site: Left Breast  Stereotactic Radiosurgery: No  Concurrent Therapy: No  Today's Dose: 2650  Total Dose for Breast: 5240  Today's Fraction/Total Fraction Breast: 10/20  Drainage: 0: Absent                                     Sexuality Alteration                    Emotional Alteration    Copin: Effective  Comfort Alteration   KPS: 90% Can perform normal activity, minor signs of disease  Fatigue (ONS scale): 6: Moderate Fatigue  Pain Location: occasional shooting pain   Nutrition Alteration   Anorexia: 0: None  Nausea: 0: None  Vomitin: None  Weight: 61.1 kg (134 lb 12.8 oz)  Skin Alteration   Skin Sensation: 1:Pruitis  Skin Reaction: 2: Bright erythema (aquaphor given for nipple tenderness)  AUA Assessment                                           Accompanied by       Objective:     Exam: mild Erythema.    Vitals:    08/10/21 1113   BP: 105/64   Pulse: 62   Resp: 16   Temp: 98.3  F (36.8  C)   TempSrc: Oral   SpO2: 100%   Weight: 61.1 kg (134 lb 12.8 oz)       Wt Readings from Last 8 Encounters:   08/10/21 61.1 kg (134 lb 12.8 oz)   21 61.1 kg  (134 lb 12.8 oz)   06/30/21 62.3 kg (137 lb 6.4 oz)   06/14/21 63.5 kg (140 lb)   06/09/21 63.5 kg (140 lb)   06/08/21 63.5 kg (140 lb)   06/12/03 62.4 kg (137 lb 8 oz)       General: Alert and oriented, in no acute distress  Antonella has mild Erythema.  Aria chart and setup information reviewed    Kim Mayer MD

## 2021-08-11 ENCOUNTER — APPOINTMENT (OUTPATIENT)
Dept: RADIATION ONCOLOGY | Facility: CLINIC | Age: 41
End: 2021-08-11
Attending: RADIOLOGY
Payer: COMMERCIAL

## 2021-08-11 ENCOUNTER — TELEPHONE (OUTPATIENT)
Dept: ONCOLOGY | Facility: CLINIC | Age: 41
End: 2021-08-11

## 2021-08-11 DIAGNOSIS — C50.112 MALIGNANT NEOPLASM OF CENTRAL PORTION OF LEFT BREAST IN FEMALE, ESTROGEN RECEPTOR POSITIVE (H): Primary | ICD-10-CM

## 2021-08-11 DIAGNOSIS — Z17.0 MALIGNANT NEOPLASM OF CENTRAL PORTION OF LEFT BREAST IN FEMALE, ESTROGEN RECEPTOR POSITIVE (H): Primary | ICD-10-CM

## 2021-08-11 PROCEDURE — 77412 RADIATION TX DELIVERY LVL 3: CPT

## 2021-08-11 PROCEDURE — 77290 THER RAD SIMULAJ FIELD CPLX: CPT | Mod: 26 | Performed by: RADIOLOGY

## 2021-08-11 PROCEDURE — 77290 THER RAD SIMULAJ FIELD CPLX: CPT | Mod: 26

## 2021-08-11 PROCEDURE — 77387 GUIDANCE FOR RADJ TX DLVR: CPT

## 2021-08-11 PROCEDURE — G6017 INTRAFRACTION TRACK MOTION: HCPCS | Performed by: RADIOLOGY

## 2021-08-11 RX ORDER — TAMOXIFEN CITRATE 20 MG/1
20 TABLET ORAL DAILY
Qty: 30 TABLET | Refills: 0 | Status: SHIPPED | OUTPATIENT
Start: 2021-08-11 | End: 2021-09-07

## 2021-08-11 NOTE — TELEPHONE ENCOUNTER
Antonella stopped by the 's desk today to inquire about how she will get her Tamoxifen and when she should be scheduled for follow up with Dr. Rosa.  Called Antonella and MILENA that writer was calling to follow up with her questions. Contact information was provided and invited return phone call. Will follow up. (Of note, Dr. Rosa would like her to start treatment now. Tamoxifen will be sent to her pharmacy on file. Follow up in 3 months after starting Tamoxifen)/Juanis Galdamez/NABILA

## 2021-08-12 ENCOUNTER — APPOINTMENT (OUTPATIENT)
Dept: RADIATION ONCOLOGY | Facility: CLINIC | Age: 41
End: 2021-08-12
Attending: RADIOLOGY
Payer: COMMERCIAL

## 2021-08-12 PROCEDURE — G6017 INTRAFRACTION TRACK MOTION: HCPCS | Performed by: STUDENT IN AN ORGANIZED HEALTH CARE EDUCATION/TRAINING PROGRAM

## 2021-08-12 PROCEDURE — 77412 RADIATION TX DELIVERY LVL 3: CPT

## 2021-08-12 PROCEDURE — 77387 GUIDANCE FOR RADJ TX DLVR: CPT

## 2021-08-12 NOTE — TELEPHONE ENCOUNTER
Called Antonella to follow up  and relayed that Dr. Rosa has sent a prescription for Tamoxifen to her pharmacy on file. She was instructed to start the Tamoxifen when she receives it. She is aware that both Dr. Rosa and Dr. Mayer are aware of the plan of her starting the Tamoxifen at this time and not waiting until radiation completion. She was instructed to schedule a follow up in 3 months with scheduling and can stop by their desk after her radiation appointment/ She verbalized understanding and appreciation.

## 2021-08-13 ENCOUNTER — APPOINTMENT (OUTPATIENT)
Dept: RADIATION ONCOLOGY | Facility: CLINIC | Age: 41
End: 2021-08-13
Attending: RADIOLOGY
Payer: COMMERCIAL

## 2021-08-13 PROCEDURE — 77307 TELETHX ISODOSE PLAN CPLX: CPT

## 2021-08-13 PROCEDURE — 77387 GUIDANCE FOR RADJ TX DLVR: CPT

## 2021-08-13 PROCEDURE — 77387 GUIDANCE FOR RADJ TX DLVR: CPT | Performed by: STUDENT IN AN ORGANIZED HEALTH CARE EDUCATION/TRAINING PROGRAM

## 2021-08-13 PROCEDURE — 77412 RADIATION TX DELIVERY LVL 3: CPT

## 2021-08-13 PROCEDURE — 77307 TELETHX ISODOSE PLAN CPLX: CPT | Performed by: STUDENT IN AN ORGANIZED HEALTH CARE EDUCATION/TRAINING PROGRAM

## 2021-08-13 PROCEDURE — 77334 RADIATION TREATMENT AID(S): CPT | Mod: 26 | Performed by: STUDENT IN AN ORGANIZED HEALTH CARE EDUCATION/TRAINING PROGRAM

## 2021-08-13 PROCEDURE — 77334 RADIATION TREATMENT AID(S): CPT | Mod: 26

## 2021-08-16 ENCOUNTER — APPOINTMENT (OUTPATIENT)
Dept: RADIATION ONCOLOGY | Facility: CLINIC | Age: 41
End: 2021-08-16
Attending: RADIOLOGY
Payer: COMMERCIAL

## 2021-08-16 PROCEDURE — 77412 RADIATION TX DELIVERY LVL 3: CPT

## 2021-08-16 PROCEDURE — 77387 GUIDANCE FOR RADJ TX DLVR: CPT

## 2021-08-16 PROCEDURE — G6017 INTRAFRACTION TRACK MOTION: HCPCS | Performed by: STUDENT IN AN ORGANIZED HEALTH CARE EDUCATION/TRAINING PROGRAM

## 2021-08-17 ENCOUNTER — APPOINTMENT (OUTPATIENT)
Dept: RADIATION ONCOLOGY | Facility: CLINIC | Age: 41
End: 2021-08-17
Attending: RADIOLOGY
Payer: COMMERCIAL

## 2021-08-17 VITALS
HEART RATE: 69 BPM | WEIGHT: 133.9 LBS | OXYGEN SATURATION: 100 % | BODY MASS INDEX: 19.77 KG/M2 | DIASTOLIC BLOOD PRESSURE: 65 MMHG | TEMPERATURE: 98.5 F | SYSTOLIC BLOOD PRESSURE: 108 MMHG | RESPIRATION RATE: 16 BRPM

## 2021-08-17 DIAGNOSIS — C50.112 MALIGNANT NEOPLASM OF CENTRAL PORTION OF LEFT BREAST IN FEMALE, ESTROGEN RECEPTOR POSITIVE (H): Primary | ICD-10-CM

## 2021-08-17 DIAGNOSIS — Z17.0 MALIGNANT NEOPLASM OF CENTRAL PORTION OF LEFT BREAST IN FEMALE, ESTROGEN RECEPTOR POSITIVE (H): Primary | ICD-10-CM

## 2021-08-17 PROCEDURE — 77387 GUIDANCE FOR RADJ TX DLVR: CPT

## 2021-08-17 PROCEDURE — 77412 RADIATION TX DELIVERY LVL 3: CPT

## 2021-08-17 PROCEDURE — G6017 INTRAFRACTION TRACK MOTION: HCPCS | Performed by: STUDENT IN AN ORGANIZED HEALTH CARE EDUCATION/TRAINING PROGRAM

## 2021-08-17 PROCEDURE — 77336 RADIATION PHYSICS CONSULT: CPT

## 2021-08-17 PROCEDURE — 77427 RADIATION TX MANAGEMENT X5: CPT | Performed by: STUDENT IN AN ORGANIZED HEALTH CARE EDUCATION/TRAINING PROGRAM

## 2021-08-17 NOTE — PROGRESS NOTES
RADIATION ONCOLOGY WEEKLY TREATMENT VISIT NOTE      Assessment / Impression     Malignant neoplasm of central portion of left breast in female, estrogen receptor positive (H) [C50.112, Z17.0]    Cancer Staging  No matching staging information was found for the patient.     Left breast cancer, pathologic stage T1cN0(sn) M0, ER/MO positive, HER-2 negative, Oncotype DX score 11, status post lumpectomy and sentinel resection with negative margin.     Tolerating radiation therapy well.  All questions and concerns addressed.    Grade 2 radiation dermatitis    Plan:     Continue radiation treatment as prescribed.  Radiation:   Site: Left Breast  Stereotactic Radiosurgery: No  Concurrent Therapy: No  Today's Dose: 3975  Total Dose for Breast: 5240  Today's Fraction/Total Fraction Breast: 15/20    Discussed skin care, Mepilex lite and hydrogel dressings given to try    Subjective:      HPI: Antonella Jamison is a 40 year old female with  Malignant neoplasm of central portion of left breast in female, estrogen receptor positive (H) [C50.112, Z17.0]    She continues to tolerate radiation therapy well.  She has increasing skin irritation with some pruritus.  No significant skin peeling.  Has increasing pain and tenderness around the nipple areolar complex.  Has been using Aquaphor regularly.  No lymphedema.    The following portions of the patient's history were reviewed and updated as appropriate: allergies, current medications, past family history, past medical history, past social history, past surgical history and problem list.    Assessment                  Body Site:  Breast                           Site: Left Breast  Stereotactic Radiosurgery: No  Concurrent Therapy: No  Today's Dose: 3975  Total Dose for Breast: 5240  Today's Fraction/Total Fraction Breast: 15/20  Drainage: 0: Absent                                     Sexuality Alteration                    Emotional Alteration       Comfort Alteration        Nutrition Alteration      Skin Alteration      AUA Assessment                                           Accompanied by       Objective:     Exam:     There were no vitals filed for this visit.    Wt Readings from Last 8 Encounters:   08/10/21 61.1 kg (134 lb 12.8 oz)   08/03/21 61.1 kg (134 lb 12.8 oz)   06/30/21 62.3 kg (137 lb 6.4 oz)   06/14/21 63.5 kg (140 lb)   06/09/21 63.5 kg (140 lb)   06/08/21 63.5 kg (140 lb)   06/12/03 62.4 kg (137 lb 8 oz)       General: Alert and oriented, in no acute distress  Antonella has moderate Erythema.  No lymphedema upper extremity or chest    Treatment Summary to Date    Aria chart and setup information reviewed    Mable Funk MD

## 2021-08-17 NOTE — LETTER
8/17/2021         RE: Antonella Jamison  [address] [facility] [address]        Dear Colleague,    Thank you for referring your patient, Antonella Jamison, to the [facility] RADIATION ONCOLOGY [place]. Please see a copy of my visit note below.      RADIATION ONCOLOGY WEEKLY TREATMENT VISIT NOTE      Assessment / Impression     Malignant neoplasm of central portion of left breast in female, estrogen receptor positive (H) [C50.112, Z17.0]    Cancer Staging  No matching staging information was found for the patient.     Left breast cancer, pathologic stage T1cN0(sn) M0, ER/IL positive, HER-2 negative, Oncotype DX score 11, status post lumpectomy and sentinel resection with negative margin.     Tolerating radiation therapy well.  All questions and concerns addressed.    Grade 2 radiation dermatitis    Plan:     Continue radiation treatment as prescribed.  Radiation:   Site: Left Breast  Stereotactic Radiosurgery: No  Concurrent Therapy: No  Today's Dose: 3975  Total Dose for Breast: 5240  Today's Fraction/Total Fraction Breast: 15/20    Discussed skin care, Mepilex lite and hydrogel dressings given to try    Subjective:      HPI: Antonella Jamison is a 40 year old female with  Malignant neoplasm of central portion of left breast in female, estrogen receptor positive (H) [C50.112, Z17.0]    She continues to tolerate radiation therapy well.  She has increasing skin irritation with some pruritus.  No significant skin peeling.  Has increasing pain and tenderness around the nipple areolar complex.  Has been using Aquaphor regularly.  No lymphedema.    The following portions of the patient's history were reviewed and updated as appropriate: allergies, current medications, past family history, past medical history, past social history, past surgical history and problem list.    Assessment                  Body Site:  Breast                           Site: Left Breast  Stereotactic Radiosurgery: No  Concurrent Therapy:  No  Today's Dose: 3975  Total Dose for Breast: 5240  Today's Fraction/Total Fraction Breast: 15/20  Drainage: 0: Absent                                     Sexuality Alteration                    Emotional Alteration       Comfort Alteration       Nutrition Alteration      Skin Alteration      AUA Assessment                                           Accompanied by       Objective:     Exam:     There were no vitals filed for this visit.    Wt Readings from Last 8 Encounters:   08/10/21 61.1 kg (134 lb 12.8 oz)   08/03/21 61.1 kg (134 lb 12.8 oz)   06/30/21 62.3 kg (137 lb 6.4 oz)   06/14/21 63.5 kg (140 lb)   06/09/21 63.5 kg (140 lb)   06/08/21 63.5 kg (140 lb)   06/12/03 62.4 kg (137 lb 8 oz)       General: Alert and oriented, in no acute distress  Antonella has moderate Erythema.  No lymphedema upper extremity or chest    Treatment Summary to Date    Aria chart and setup information reviewed    Mable Funk MD      Again, thank you for allowing me to participate in the care of your patient.        Sincerely,        Mable Funk MD

## 2021-08-18 ENCOUNTER — APPOINTMENT (OUTPATIENT)
Dept: RADIATION ONCOLOGY | Facility: CLINIC | Age: 41
End: 2021-08-18
Attending: RADIOLOGY
Payer: COMMERCIAL

## 2021-08-18 PROCEDURE — 77412 RADIATION TX DELIVERY LVL 3: CPT

## 2021-08-18 PROCEDURE — 77387 GUIDANCE FOR RADJ TX DLVR: CPT

## 2021-08-18 PROCEDURE — G6017 INTRAFRACTION TRACK MOTION: HCPCS | Performed by: STUDENT IN AN ORGANIZED HEALTH CARE EDUCATION/TRAINING PROGRAM

## 2021-08-19 ENCOUNTER — APPOINTMENT (OUTPATIENT)
Dept: RADIATION ONCOLOGY | Facility: CLINIC | Age: 41
End: 2021-08-19
Attending: RADIOLOGY
Payer: COMMERCIAL

## 2021-08-19 PROCEDURE — 77387 GUIDANCE FOR RADJ TX DLVR: CPT

## 2021-08-19 PROCEDURE — 77412 RADIATION TX DELIVERY LVL 3: CPT

## 2021-08-19 PROCEDURE — G6017 INTRAFRACTION TRACK MOTION: HCPCS | Performed by: RADIOLOGY

## 2021-08-20 ENCOUNTER — APPOINTMENT (OUTPATIENT)
Dept: RADIATION ONCOLOGY | Facility: CLINIC | Age: 41
End: 2021-08-20
Attending: RADIOLOGY
Payer: COMMERCIAL

## 2021-08-20 PROCEDURE — 77387 GUIDANCE FOR RADJ TX DLVR: CPT

## 2021-08-20 PROCEDURE — G6017 INTRAFRACTION TRACK MOTION: HCPCS | Performed by: STUDENT IN AN ORGANIZED HEALTH CARE EDUCATION/TRAINING PROGRAM

## 2021-08-20 PROCEDURE — 77412 RADIATION TX DELIVERY LVL 3: CPT

## 2021-08-23 ENCOUNTER — VIRTUAL VISIT (OUTPATIENT)
Dept: ONCOLOGY | Facility: CLINIC | Age: 41
End: 2021-08-23
Attending: INTERNAL MEDICINE
Payer: COMMERCIAL

## 2021-08-23 ENCOUNTER — APPOINTMENT (OUTPATIENT)
Dept: RADIATION ONCOLOGY | Facility: CLINIC | Age: 41
End: 2021-08-23
Attending: RADIOLOGY
Payer: COMMERCIAL

## 2021-08-23 DIAGNOSIS — F43.29 ADJUSTMENT DISORDER WITH MIXED EMOTIONAL FEATURES: Primary | ICD-10-CM

## 2021-08-23 DIAGNOSIS — C50.112 MALIGNANT NEOPLASM OF CENTRAL PORTION OF LEFT BREAST IN FEMALE, ESTROGEN RECEPTOR POSITIVE (H): ICD-10-CM

## 2021-08-23 DIAGNOSIS — Z17.0 MALIGNANT NEOPLASM OF CENTRAL PORTION OF LEFT BREAST IN FEMALE, ESTROGEN RECEPTOR POSITIVE (H): ICD-10-CM

## 2021-08-23 PROCEDURE — 90837 PSYTX W PT 60 MINUTES: CPT | Mod: 95 | Performed by: SOCIAL WORKER

## 2021-08-23 PROCEDURE — 77412 RADIATION TX DELIVERY LVL 3: CPT

## 2021-08-23 PROCEDURE — G6017 INTRAFRACTION TRACK MOTION: HCPCS | Performed by: RADIOLOGY

## 2021-08-23 PROCEDURE — 77387 GUIDANCE FOR RADJ TX DLVR: CPT

## 2021-08-23 NOTE — CONFIDENTIAL NOTE
Psychology Psychotherapy  Note-telephone visit    Name:  Antonella Jamison  :  1980  MRN:  [id number]      Date of Service: 2021  Duration: 60 minutes (1:00-2:00 PM)    The patient has been notified of following:      This telephone visit will be conducted via a call between you and your provider. We have found that certain health care needs can be provided without a face to face meeting.  This service lets us provide the care you need with a short phone conversation.      Telephone visits are billed at different rates depending on your insurance coverage. During this emergency period, for some insurers they may be billed the same as an in-person visit.  Please reach out to your insurance provider with any questions.     If during the course of the call the if provider feels a telephone visit is not appropriate, you will not be charged for this service.     Patient has given verbal consent to a Telephone visit? Yes      Target Symptoms:    The patient was seen in light of concerns regarding symptoms of anxiety and depression as evidenced by patient and staff report.    Participation:  The patient was able to participate and benefit from treatment as evidenced by her verbal expression of ideas and initiation of topics discussed.    Mental Status:    Mood/Affect:  normal affect  Suicidal Ideation:  absent  Homicidal Ideation:  absent  Thought process:  normal  Thought content:  Clear  Fund of Knowledge:  Sufficient  Attention/Concentration:  Normal  Language ability:  intact  Speech: normal  Memory:  recent and remote memory intact  Insight and Judgement:  good  Orientation:  self, place and time  Appearance: N/A-telephone visit  Eye Contact: N/A-telephone visit  Estimated IQ:  Average      Intervention:    Antonella was referred to me by Dr. Rosa for individual psychotherapy to help her deal with the emotional aspects of breast cancer survivorship.  Antonella has a diagnosis of stage Ia invasive ductal carcinoma  of the upper outer quadrant of the left female breast, ER positive, OR positive, HER-2 negative.  She had her first mammogram on 2021.  She had bone a lump in her left breast prior to scheduling her mammogram.  She had left breast lumpectomy and left sentinel node biopsy on 2021 she underwent genetic counseling and testing on 6/10/2021 and her test results were negative.  She has been receiving radiation therapy and has her final treatment tomorrow, 2021.  She started on tamoxifen 1 week ago, and so far has not had any other problems.  Antonella states that she received the genetic testing, due to her age, but also her mother  15 years ago of pancreatic cancer.  She also states that her aunts on her mother's side also have cancer histories.    Antonella was  for 11 years, and  in 2016, 5 years ago.  She states that her ex- is not in the picture at all.  She describes him as being a pathological liar and he had affairs throughout their entire marriage.  He also was involved in gambling and it sold money from her.  He has 2 other children from 2 different women.  Arely is a single mom to very-year-old daughter, Orlando.  She is 10 years old, and will be 8 in October.  She was named after Arely's mother, who was Estrella and her father's middle name is Andreas.  Arely has been very open with her daughter about her cancer.  She has resources that been helpful in this area.  Her daughter has been doing school virtually since the pandemic, and will continue to do virtual school for the first semester, until things are clear related to the delta variant.  Arely indicates that she is not in a current relationship, she states that she tried dating a few years ago, but struggles with trust issues.    Since her cancer diagnosis, Arely has been working at making some life changes.  She has been a vegan for 7 years, and her daughter who is a vegan.  She recently joined the [employer], and is working out on a  regular basis.  She also talked with both Dr. Rosa and Dr. Ramirez about her drinking.  She had been drinking 2 to 3 glasses of wine a day.  She states that she has never been concerned about her drinking, but has been a social drinker.  She currently has cut to having 1 glass of wine a week.    Arely grew up in [place].  She is the youngest in the family and has 2 older brothers.  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  When her brothers were off to college and she was 14 years old, her parents decided to sell their businesses and get involved in [employer] global missions.  They volunteered in [facility] for a year.  They then worked in [place] for 2 years.  They went back to [place] for a number of years after that.  Arely's ex- is from [place].    Antonella states that she has a very close relationship with her family.  She sees her dad on a regular basis.  He lives in [place] with his girlfriend.  He reacquainted with an old high school girlfriend.  Antonella struggles with this relationship, and tries to do things separately with her dad.  Her oldest brother is  and lives in [place].  He has 3 children ages 15, 13 and 9.  Her second oldest brother and his family are moving to [place] in the very near future.  He has twin 13-year-old boys.  Arely is very close to her brothers and their families and they spend a lot of time with each other.    Arely was raised [place].  She has not been involved in a Sikhism or a long time.  She believes that she is a very spiritual person and believes in her life.    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    Arely and her daughter have a fairly new [place] puppy was sick and needed to be hospitalized for  several weeks.This added some extra stress in her family.  She states that he is doing good now, which is relieved both of them.    Arely was interested in individual psychotherapy to help her cope with her cancer and cancer survivorship.  We discussed various resources that would be helpful for her in her survivorship.  I sent her information about the upcoming survivorship series that will be offered through [facility] starting in September.  I also sent her information about pathways in [place].  I also recommended several books regarding spirituality and healing.  She is trying to process her thoughts and feelings about her cancer journey at such a young age.  She will plan to start to look at her life priorities and the lessons that she has learned about life from this experience.    Psychoterapeutic Techniques:  Cognitive-behavioral therapy, motivational interviewing and supportive psychotherapy strategies were utilized.    Necessity:    The session was necessary for the care of the patient to address symptoms of depression and anxiety related to the patient's medical condition.    Progress and Plan:    1.  Arely will look into the survivorship series that is offered through [facility] starting in September 2021.  Information about this program was emailed to her today.    2.  Arely will look into some of the programs that are offered through [facility] in [place] as discussed.    3.  Arely will consider looking at some of the psychoeducational reading material that we discussed.    4.  Arely will consider some of the life lessons that she is learned through her cancer journey. She will think about evaluating some of her life lessons and priorities.    I.  Arely will follow up with me on 9/22/2021 at 11:00 AM for a telephone psychotherapy session.    Diagnosis:   Adjustment disorder with mixed emotional features  Malignant neoplasm of the central portion of the left female breast    Problem List:  Patient Active  Problem List   Diagnosis     Malignant neoplasm of central portion of left breast in female, estrogen receptor positive (H)     This note was created with the help of Dragon dictation system.  Grammatical and typing errors are not intentional.    Provider: Cyndi Dial MA, LP, [facility]    Date:  8/23/2021  Time:  3:53 PM

## 2021-08-24 ENCOUNTER — OFFICE VISIT (OUTPATIENT)
Dept: RADIATION ONCOLOGY | Facility: CLINIC | Age: 41
End: 2021-08-24
Attending: RADIOLOGY
Payer: COMMERCIAL

## 2021-08-24 VITALS
TEMPERATURE: 98.3 F | RESPIRATION RATE: 16 BRPM | HEART RATE: 66 BPM | DIASTOLIC BLOOD PRESSURE: 63 MMHG | SYSTOLIC BLOOD PRESSURE: 109 MMHG | OXYGEN SATURATION: 99 %

## 2021-08-24 DIAGNOSIS — C50.112 MALIGNANT NEOPLASM OF CENTRAL PORTION OF LEFT BREAST IN FEMALE, ESTROGEN RECEPTOR POSITIVE (H): Primary | ICD-10-CM

## 2021-08-24 DIAGNOSIS — Z17.0 MALIGNANT NEOPLASM OF CENTRAL PORTION OF LEFT BREAST IN FEMALE, ESTROGEN RECEPTOR POSITIVE (H): Primary | ICD-10-CM

## 2021-08-24 PROCEDURE — G6017 INTRAFRACTION TRACK MOTION: HCPCS | Performed by: RADIOLOGY

## 2021-08-24 PROCEDURE — 77336 RADIATION PHYSICS CONSULT: CPT

## 2021-08-24 PROCEDURE — 77412 RADIATION TX DELIVERY LVL 3: CPT

## 2021-08-24 PROCEDURE — 77427 RADIATION TX MANAGEMENT X5: CPT | Performed by: RADIOLOGY

## 2021-08-24 PROCEDURE — 77387 GUIDANCE FOR RADJ TX DLVR: CPT

## 2021-08-24 NOTE — LETTER
2021         RE: Antonella Jamison  [address] [facility] [address]        Dear Colleague,    Thank you for referring your patient, Antonella Jamison, to the [facility] RADIATION ONCOLOGY [place]. Please see a copy of my visit note below.         Radiation Treatment Summary          Patient: Antonella Jamison            MRN: [id number]           : 1980        Care Provider: Kim Mayer MD         Date of Service: Aug 24, 2021        Sigrid Ramirez MD  [address] [place] [id number]            Dear Dr. Ramirez:     Your patient Mrs. Antonella Jamison completed her radiation therapy on 2021. As you know Ms. Jamison is a 40 y.o. female with a diagnosis of left breast cancer, pathologic stage T1cN0(sn) M0, ER/MI positive, HER-2 negative, Oncotype DX score 11, status post lumpectomy and sentinel resection with negative margin. The patient received postop radiation therapy for her breast cancer with a total dose of 5240 cGy in 20 treatments given from 2021-2021.  She tolerated ration therapy very well with expected acute side effect.  Patient is scheduled to return to radiation oncology in 4 weeks for routine post therapy office follow-up.    Again, thank you very much for the referral and allowing me to participate in the care of this patient.  If you have any questions or concerns about this patient, please do not hesitate to call.          Sincerely,      Kim Mayer MD, PhD  Department of Radiation Oncology   [facility] Radiation Oncology  Tel: [phone]  Page: [phone]    [facility]  [address] [place] [address]     [address]   [place] [address]    CC:  Patient Care Team:  Rosemary Mata MD as PCP - General (Family Medicine)  Kim Mayer MD as MD (Hematology & Oncology)  Charley Schafer NP as Nurse Practitioner (Hematology & Oncology)  Sheri Rosa MD as MD (Hematology &  Oncology)  Ainsley Love, RN as Specialty Care Coordinator (Hematology & Oncology)        RADIATION ONCOLOGY WEEKLY TREATMENT VISIT NOTE      Assessment / Impression     Malignant neoplasm of central portion of left breast in female, estrogen receptor positive (H) [C50.112, Z17.0]     Tolerating radiation therapy well.  All questions and concerns addressed.    Plan:     Follow-up with radiation oncology in 4 weeks.    Subjective:      HPI: Antonella Jamison is a 40 year old female with  Malignant neoplasm of central portion of left breast in female, estrogen receptor positive (H) [C50.112, Z17.0]    The following portions of the patient's history were reviewed and updated as appropriate: allergies, current medications, past family history, past medical history, past social history, past surgical history and problem list.    Assessment                  Body Site:  Breast                           Site: Left Breast  Stereotactic Radiosurgery: No  Concurrent Therapy: No  Today's Dose: 5240  Total Dose for Breast: 5240  Today's Fraction/Total Fraction Breast: 20/20                                     Sexuality Alteration                    Emotional Alteration    Copin: Effective  Comfort Alteration   KPS: 90% Can perform normal activity, minor signs of disease  Fatigue (ONS scale): 4: Moderate Fatigue  Pain Location: Left axila and nipple  Pain Intensity. Rate degree of pain ranging from 0 (no pain) to 10 (severe pain): 6/10  Pain Description: Burning - Burning, hot, fire type pain  Pain Intervention: 1: Over the counter medications (locations)   Nutrition Alteration   Anorexia: 0: None  Nausea: 0: None  Vomitin: None  Skin Alteration   Skin Sensation: 2: Burning  Skin Reaction: 2: Bright erythema  AUA Assessment                                           Accompanied by       Objective:     Exam: moderate Erythema.    Vitals:    21 1505   BP: 109/63   Pulse: 66   Resp: 16   Temp: 98.3  F (36.8   C)   TempSrc: Oral   SpO2: 99%       Wt Readings from Last 8 Encounters:   08/17/21 60.7 kg (133 lb 14.4 oz)   08/10/21 61.1 kg (134 lb 12.8 oz)   08/03/21 61.1 kg (134 lb 12.8 oz)   06/30/21 62.3 kg (137 lb 6.4 oz)   06/14/21 63.5 kg (140 lb)   06/09/21 63.5 kg (140 lb)   06/08/21 63.5 kg (140 lb)   06/12/03 62.4 kg (137 lb 8 oz)       General: Alert and oriented, in no acute distress  Antonella has moderate Erythema.  Aria chart and setup information reviewed    Kim Mayer MD        Again, thank you for allowing me to participate in the care of your patient.        Sincerely,        Kim Mayer MD

## 2021-08-24 NOTE — PROGRESS NOTES
Radiation Treatment Summary          Patient: Antonella Jamison            MRN: [id number]           : 1980        Care Provider: Kim Mayer MD         Date of Service: Aug 24, 2021        Sigrid Ramirez MD  [address] [place] [id number]            Dear Dr. Ramirez:     Your patient Mrs. Antonella Jamison completed her radiation therapy on 2021. As you know Ms. Jamison is a 40 y.o. female with a diagnosis of left breast cancer, pathologic stage T1cN0(sn) M0, ER/IA positive, HER-2 negative, Oncotype DX score 11, status post lumpectomy and sentinel resection with negative margin. The patient received postop radiation therapy for her breast cancer with a total dose of 5240 cGy in 20 treatments given from 2021-2021.  She tolerated ration therapy very well with expected acute side effect.  Patient is scheduled to return to radiation oncology in 4 weeks for routine post therapy office follow-up.    Again, thank you very much for the referral and allowing me to participate in the care of this patient.  If you have any questions or concerns about this patient, please do not hesitate to call.          Sincerely,      Kim Mayer MD, PhD  Department of Radiation Oncology   [facility] Radiation Oncology  Tel: [phone]  Page: [phone]    [facility]  [address] [place] [address]     [address] [place] [address]    CC:  Patient Care Team:  Rosemary Mata MD as PCP - General (Family Medicine)  Kim Mayer MD as MD (Hematology & Oncology)  Charley Schafer NP as Nurse Practitioner (Hematology & Oncology)  Sheri Rosa MD as MD (Hematology & Oncology)  Ainsley Love, RN as Specialty Care Coordinator (Hematology & Oncology)

## 2021-08-24 NOTE — PROGRESS NOTES
Pt given discharge paperwork/instruction and plan for follow-up. Pt encouraged to contact clinic if she has any questions or concerns.

## 2021-08-24 NOTE — PROGRESS NOTES
RADIATION ONCOLOGY WEEKLY TREATMENT VISIT NOTE      Assessment / Impression     Malignant neoplasm of central portion of left breast in female, estrogen receptor positive (H) [C50.112, Z17.0]     Tolerating radiation therapy well.  All questions and concerns addressed.    Plan:     Follow-up with radiation oncology in 4 weeks.    Subjective:      HPI: Antonella Jamison is a 40 year old female with  Malignant neoplasm of central portion of left breast in female, estrogen receptor positive (H) [C50.112, Z17.0]    The following portions of the patient's history were reviewed and updated as appropriate: allergies, current medications, past family history, past medical history, past social history, past surgical history and problem list.    Assessment                  Body Site:  Breast                           Site: Left Breast  Stereotactic Radiosurgery: No  Concurrent Therapy: No  Today's Dose: 5240  Total Dose for Breast: 5240  Today's Fraction/Total Fraction Breast: 20/20                                     Sexuality Alteration                    Emotional Alteration    Copin: Effective  Comfort Alteration   KPS: 90% Can perform normal activity, minor signs of disease  Fatigue (ONS scale): 4: Moderate Fatigue  Pain Location: Left axila and nipple  Pain Intensity. Rate degree of pain ranging from 0 (no pain) to 10 (severe pain): 6/10  Pain Description: Burning - Burning, hot, fire type pain  Pain Intervention: 1: Over the counter medications (locations)   Nutrition Alteration   Anorexia: 0: None  Nausea: 0: None  Vomitin: None  Skin Alteration   Skin Sensation: 2: Burning  Skin Reaction: 2: Bright erythema  AUA Assessment                                           Accompanied by       Objective:     Exam: moderate Erythema.    Vitals:    21 1505   BP: 109/63   Pulse: 66   Resp: 16   Temp: 98.3  F (36.8  C)   TempSrc: Oral   SpO2: 99%       Wt Readings from Last 8 Encounters:   21 60.7  kg (133 lb 14.4 oz)   08/10/21 61.1 kg (134 lb 12.8 oz)   08/03/21 61.1 kg (134 lb 12.8 oz)   06/30/21 62.3 kg (137 lb 6.4 oz)   06/14/21 63.5 kg (140 lb)   06/09/21 63.5 kg (140 lb)   06/08/21 63.5 kg (140 lb)   06/12/03 62.4 kg (137 lb 8 oz)       General: Alert and oriented, in no acute distress  Antonella has moderate Erythema.  Aria chart and setup information reviewed    Kim Mayer MD

## 2021-09-03 ENCOUNTER — TELEPHONE (OUTPATIENT)
Dept: RADIATION ONCOLOGY | Facility: CLINIC | Age: 41
End: 2021-09-03

## 2021-09-03 NOTE — TELEPHONE ENCOUNTER
Called patient for routine follow up call s/p radiation for her breast cancer.  Patient states she had a possible allergic reaction to one of the moisturizers she was using.  She stopped all lotion and feels her skin is improving greatly now.  Patient also had more fatigue the week following radiation but feels this to is improving.    Patient denies needing anything today.  Confirmed follow up appointments for 9/22/21.  Told patient to call us if she has any questions or concerns before her appointments.

## 2021-09-06 DIAGNOSIS — Z17.0 MALIGNANT NEOPLASM OF CENTRAL PORTION OF LEFT BREAST IN FEMALE, ESTROGEN RECEPTOR POSITIVE (H): ICD-10-CM

## 2021-09-06 DIAGNOSIS — C50.112 MALIGNANT NEOPLASM OF CENTRAL PORTION OF LEFT BREAST IN FEMALE, ESTROGEN RECEPTOR POSITIVE (H): ICD-10-CM

## 2021-09-07 RX ORDER — TAMOXIFEN CITRATE 20 MG/1
TABLET ORAL
Qty: 30 TABLET | Refills: 0 | Status: SHIPPED | OUTPATIENT
Start: 2021-09-07 | End: 2021-10-07

## 2021-09-07 NOTE — TELEPHONE ENCOUNTER
Refill request received from [facility] for Tamoxifen, last filled by Dr. Rosa on 8/11/2021, 30 tablets, 0 refills/Juanis Galdamez RN

## 2021-09-21 ENCOUNTER — ONCOLOGY VISIT (OUTPATIENT)
Dept: ONCOLOGY | Facility: CLINIC | Age: 41
End: 2021-09-21
Attending: INTERNAL MEDICINE
Payer: COMMERCIAL

## 2021-09-21 VITALS
OXYGEN SATURATION: 100 % | SYSTOLIC BLOOD PRESSURE: 116 MMHG | HEIGHT: 69 IN | BODY MASS INDEX: 19.93 KG/M2 | WEIGHT: 134.6 LBS | HEART RATE: 71 BPM | DIASTOLIC BLOOD PRESSURE: 74 MMHG | RESPIRATION RATE: 16 BRPM

## 2021-09-21 DIAGNOSIS — Z17.0 MALIGNANT NEOPLASM OF CENTRAL PORTION OF LEFT BREAST IN FEMALE, ESTROGEN RECEPTOR POSITIVE (H): ICD-10-CM

## 2021-09-21 DIAGNOSIS — N63.12 LUMP IN UPPER INNER QUADRANT OF RIGHT BREAST: Primary | ICD-10-CM

## 2021-09-21 DIAGNOSIS — C50.112 MALIGNANT NEOPLASM OF CENTRAL PORTION OF LEFT BREAST IN FEMALE, ESTROGEN RECEPTOR POSITIVE (H): ICD-10-CM

## 2021-09-21 PROCEDURE — G0463 HOSPITAL OUTPT CLINIC VISIT: HCPCS

## 2021-09-21 PROCEDURE — 99214 OFFICE O/P EST MOD 30 MIN: CPT | Performed by: INTERNAL MEDICINE

## 2021-09-21 RX ORDER — HYDROCORTISONE 25 MG/G
CREAM TOPICAL
COMMUNITY
Start: 2021-09-17 | End: 2021-09-27

## 2021-09-21 ASSESSMENT — MIFFLIN-ST. JEOR: SCORE: 1339.92

## 2021-09-21 ASSESSMENT — PAIN SCALES - GENERAL: PAINLEVEL: NO PAIN (0)

## 2021-09-21 NOTE — PROGRESS NOTES
"Oncology Rooming Note    September 21, 2021 3:12 PM   Antonella Jamison is a 40 year old female who presents for:    Chief Complaint   Patient presents with     Oncology Clinic Visit     Breast Cancer     Initial Vitals: /74 (BP Location: Left arm, Cuff Size: Adult Regular)   Pulse 71   Resp 16   Ht 1.753 m (5' 9\")   Wt 61.1 kg (134 lb 9.6 oz)   SpO2 100%   BMI 19.88 kg/m   Estimated body mass index is 19.88 kg/m  as calculated from the following:    Height as of this encounter: 1.753 m (5' 9\").    Weight as of this encounter: 61.1 kg (134 lb 9.6 oz). Body surface area is 1.72 meters squared.  No Pain (0) Comment: Data Unavailable   No LMP recorded.  Allergies reviewed: Yes  Medications reviewed: Yes    Medications: Medication refills not needed today.  Pharmacy name entered into [facility]: [id number] [place], [place] - [address] AT [facility]    Clinical concerns: lump right side  Rosalia Medeiros            "

## 2021-09-21 NOTE — LETTER
"    9/21/2021         RE: Antonella Jamison  [address] [address]        Dear Colleague,    Thank you for referring your patient, Antonella Jamison, to the [facility]. Please see a copy of my visit note below.    Oncology Rooming Note    September 21, 2021 3:12 PM   Antonella Jamison is a 40 year old female who presents for:    Chief Complaint   Patient presents with     Oncology Clinic Visit     Breast Cancer     Initial Vitals: /74 (BP Location: Left arm, Cuff Size: Adult Regular)   Pulse 71   Resp 16   Ht 1.753 m (5' 9\")   Wt 61.1 kg (134 lb 9.6 oz)   SpO2 100%   BMI 19.88 kg/m   Estimated body mass index is 19.88 kg/m  as calculated from the following:    Height as of this encounter: 1.753 m (5' 9\").    Weight as of this encounter: 61.1 kg (134 lb 9.6 oz). Body surface area is 1.72 meters squared.  No Pain (0) Comment: Data Unavailable   No LMP recorded.  Allergies reviewed: Yes  Medications reviewed: Yes    Medications: Medication refills not needed today.  Pharmacy name entered into [facility]: [id number] [place] [id number] [facility]  AT [facility]    Clinical concerns: lump right side  Rosalia Medeiros              [facility] Hematology and Oncology Progress Note    Patient: Antonella Jamison  MRN: [id number]  Date of Service: Sep 21, 2021         Reason for Visit    Chief Complaint   Patient presents with     Oncology Clinic Visit     Breast Cancer       Assessment and Plan    Cancer Staging  Malignant neoplasm of central portion of left breast in female, estrogen receptor positive (H)  Staging form: Breast, AJCC 8th Edition  - Pathologic stage from 6/30/2021: Stage IA (pT1c, pN0(sn), cM0, G1, ER+, CT+, HER2-, Oncotype DX score: 11) - Signed by Sheri Rosa MD on 9/27/2021  - Clinical: No stage assigned - Unsigned      ECOG Performance    0 - Independent     Pain  Pain Score: No Pain (0)    #.  Right breast pain and " palpable self palpated mass   Will obtain diagnostic mammogram and ultrasound.    #.  Stage IA (pT1c N0 M0) invasive ductal carcinoma of the upper outer quadrant of the left breast, ER positive, SD positive, HER-2 negative.  Oncotype DX 11/3% / <1%.  #.  Premenopausal.  LMP 9/18/2021.   Tolerates tamoxifen well.  Recommended to continue tamoxifen at this point.   Follow-up clinical exam in 4 months.    #.  Alcohol use   She enjoys wine and she is worried about alcohol use and risk of breast cancer recurrence.  We reviewed the literature together.  I told her that there is an association of increased alcohol use with increased risk of breast cancer recurrence.  The amount of alcohol use is not consistent among clinical studies.  In addition to that, the clinical studies have multiple flaws and will not be conclusive no the lower surface amount.  I advised the lowest amount of alcohol use as possible.    Encounter Diagnoses:    Problem List Items Addressed This Visit        Oncology Diagnoses    Malignant neoplasm of central portion of left breast in female, estrogen receptor positive (H)      Other Visit Diagnoses     Lump in upper inner quadrant of right breast    -  Primary             CC: Rosemary Mata MD   ______________________________________________________________________________  Diagnosis  5/2021-patient self palpated a lump in her left breast.   Invasive ductal carcinoma of the upper outer quadrant of the left breast.   15 mm, grade 1.   DCIS +   pT1c N0   ER positive (99%), SD positive (97%), HER-2/kelsey negative by IHC (1+).   Oncotype DX 11/3%/<1%    Treatment to date  6/9/2021-left breast lumpectomy and left sentinel lymph node biopsy.    6/14/2021-underwent reexcision due to close margins.  7/28/2021-8/24/2021-adjuvant radiation to the breast 5240 cGy in 20 treatments.  (Dr. Mayer)  8/2021-initiated tamoxifen.    History of Present Illness    Ms. Antonella Jamison presented herself today for follow-up.   "She reported she felt a lump in the right breast with some discomfort which she felt the similar in her left breast when she was diagnosed with left breast cancer.  She takes tamoxifen regularly.  No major intolerable side effects.  She has questions regarding safe amount of alcohol use for her.    Review of systems  Apart from describing in HPI, the remainder of comprehensive ROS was negative.    Past History    Past Medical History:   Diagnosis Date     Cancer (H)      Infectious viral hepatitis      Personal history of malaria        Past Surgical History:   Procedure Laterality Date     OTHER SURGICAL HISTORY      Hairy nevis     MI MASTECTOMY, PARTIAL Left 6/9/2021    Procedure: Left Lumpectomy (With MSC u/s): [place] Lymph Node Biopsy;  Surgeon: Sigrid Ramirez MD;  Location: [facility];  Service: General     MI MASTECTOMY, PARTIAL Left 6/14/2021    Procedure: Evacuation Hematoma, Re-excision Lumpectomy;  Surgeon: Sigrid Ramirez MD;  Location: [facility];  Service: General     US SENTINEL NODE INJECTION  6/9/2021     VAGINAL DELIVERY       WISDOM TOOTH EXTRACTION       ZZC NONSPECIFIC PROCEDURE      nevus removal         Physical Exam    /74 (BP Location: Left arm, Cuff Size: Adult Regular)   Pulse 71   Resp 16   Ht 1.753 m (5' 9\")   Wt 61.1 kg (134 lb 9.6 oz)   SpO2 100%   BMI 19.88 kg/m        General: alert, awake, not in acute distress  HEENT: Head: Normal, normocephalic, atraumatic.  Eye: Normal external eye, conjunctiva, lids cornea, GABINO.  Nose: Normal external nose, mucus membranes and septum.  Pharynx: Normal buccal mucosa. Normal pharynx.  Neck / Thyroid: Supple, no masses, nodes, nodules or enlargement.  Lymphatics: No abnormally enlarged lymph nodes.  Chest: Normal chest wall and respirations. Clear to auscultation.  Breasts: Dense breast tissue.  No palpable masses.  Heart: S1 S2 RRR, no murmur.   Abdomen: abdomen is soft without significant tenderness, masses, " organomegaly or guarding  Extremities: normal strength, tone, and muscle mass  Skin: normal. no rash or abnormalities  CNS: non focal.    Lab Results    No results found for this or any previous visit (from the past 168 hour(s)).    Imaging    US Breast Right    Result Date: 9/23/2021  EXAM: ULTRASOUND BREAST UNILATERAL RIGHT LIMITED LOCATION: [facility] DATE/TIME: 9/23/2021 2:32 PM INDICATION: Lump at the upper inner aspect of the right breast. History of left breast cancer status post breast conservation earlier this year. COMPARISON: Mammogram and ultrasound on 05/18/2021 ULTRASOUND FINDINGS: Targeted ultrasound was performed at the site of palpable lump as directed by the patient at approximately 12:00 2 cm from the nipple. Normal dense fibroglandular breast tissue is visualized. No masses or suspicious findings. No evidence for malignancy. She had a mammogram of the right breast within the past 6 months therefore this was not repeated. Per [employer] high risk screening with MRI should be considered given her personal history of left breast cancer diagnosed at age less than than 50 and dense breast tissue.     IMPRESSION: ACR BI-RADS Category 1: Negative. Results given to the patient who should resume routine screening mammography. Also, consider annual high risk screening breast MRI in addition to annual mammography. Further management of lump should be based on clinical grounds.    Signed by: Sheri Rosa MD        Again, thank you for allowing me to participate in the care of your patient.        Sincerely,        Sheri Rosa MD

## 2021-09-22 ENCOUNTER — VIRTUAL VISIT (OUTPATIENT)
Dept: ONCOLOGY | Facility: HOSPITAL | Age: 41
End: 2021-09-22
Attending: SOCIAL WORKER
Payer: COMMERCIAL

## 2021-09-22 ENCOUNTER — OFFICE VISIT (OUTPATIENT)
Dept: RADIATION ONCOLOGY | Facility: CLINIC | Age: 41
End: 2021-09-22
Attending: RADIOLOGY
Payer: COMMERCIAL

## 2021-09-22 VITALS
WEIGHT: 134.7 LBS | BODY MASS INDEX: 19.89 KG/M2 | SYSTOLIC BLOOD PRESSURE: 105 MMHG | DIASTOLIC BLOOD PRESSURE: 64 MMHG | TEMPERATURE: 98.2 F | OXYGEN SATURATION: 99 % | RESPIRATION RATE: 16 BRPM | HEART RATE: 79 BPM

## 2021-09-22 DIAGNOSIS — Z17.0 MALIGNANT NEOPLASM OF CENTRAL PORTION OF LEFT BREAST IN FEMALE, ESTROGEN RECEPTOR POSITIVE (H): Primary | ICD-10-CM

## 2021-09-22 DIAGNOSIS — C50.112 MALIGNANT NEOPLASM OF CENTRAL PORTION OF LEFT BREAST IN FEMALE, ESTROGEN RECEPTOR POSITIVE (H): Primary | ICD-10-CM

## 2021-09-22 DIAGNOSIS — C50.112 MALIGNANT NEOPLASM OF CENTRAL PORTION OF LEFT BREAST IN FEMALE, ESTROGEN RECEPTOR POSITIVE (H): ICD-10-CM

## 2021-09-22 DIAGNOSIS — Z17.0 MALIGNANT NEOPLASM OF CENTRAL PORTION OF LEFT BREAST IN FEMALE, ESTROGEN RECEPTOR POSITIVE (H): ICD-10-CM

## 2021-09-22 DIAGNOSIS — F43.29 ADJUSTMENT DISORDER WITH MIXED EMOTIONAL FEATURES: Primary | ICD-10-CM

## 2021-09-22 PROCEDURE — G0463 HOSPITAL OUTPT CLINIC VISIT: HCPCS

## 2021-09-22 PROCEDURE — 90834 PSYTX W PT 45 MINUTES: CPT | Mod: TEL | Performed by: SOCIAL WORKER

## 2021-09-22 NOTE — LETTER
9/22/2021         RE: Antonella Jamison  [address] [address]        Dear Colleague,    Thank you for referring your patient, Antonella Jamison, to the [facility] RADIATION ONCOLOGY [place]. Please see a copy of my visit note below.    [facility] Radiation Oncology Follow Up     Patient: Antonella Jamison  MRN: [id number]  Date of Service: 09/22/2021       DISEASE TREATED:  Left breast cancer, pathologic stage T1cN0(sn) M0, ER/[place] positive, HER-2 negative, Oncotype DX score 11, status post lumpectomy and sentinel resection with negative margin.       TYPE OF RADIATION THERAPY ADMINISTERED:  5240 cGy in 20 treatments given from 7/28/2021-8/24/2021.      INTERVAL SINCE COMPLETION OF RADIATION THERAPY: 1 month.      SUBJECTIVE:  Ms. Jamison is a 40 year old female who has been in her usual state of good health until recently.  The patient presented with palpable abnormality of her left breast by self-examination for which she was a seeking further evaluation.  Patient then underwent mammogram followed by ultrasound study on 5/18/2021.  This reviewed 6 x 8 x 6 mm left breast mass at 12 o'clock position highly suspicious for breast cancer.  Patient then underwent core needle biopsy on 5/19/2021 with pathology confirmed invasive breast cancer, ER/WY positive, HER-2 negative.  After discussion with patient about various treatment options, the patient had to proceed with breast preservation protocol as her treatment choice and underwent lumpectomy and sentinel resection on 6/9/2021.  The pathology reviewed 15 x 11 x 9 mm invasive ductal carcinoma, [place] grade 1, with associated DCIS.  The margin was negative with the closest margin measured 0.8 mm posterior for invasive carcinoma and positive for DCIS.  Tumor removed sentinel lymph node showed no evidence of metastatic disease.  Patient was find to have significant hematoma and underwent evacuation of hematoma and the reexcision lumpectomy  on 6/14/2021 with pathology showed no evidence of residual disease.  She has been recovering well since then.  Her Oncotype DX score is 11. The patient received postop radiation therapy for her breast cancer with a total dose of 5240 cGy in 20 treatments given from 7/28/2021-8/24/2021.  She tolerated ration therapy very well with expected acute side effect.     The patient has been recovering well since completion of radiation therapy.  She denies any pain discomfort related to the therapy at time evaluation.  She is here for routine post therapy office follow-up.    Medications were reviewed and are up to date on EPIC.    The following portions of the patient's history were reviewed and updated as appropriate: allergies, current medications, past family history, past medical history, past social history, past surgical history and problem list.    Review of Systems:      General  Constitutional  Constitutional (WDL): All constitutional elements are within defined limits  EENT  Eye Disorders  Eye Disorder (WDL): All eye disorder elements are within defined limits  Ear Disorders  Ear Disorder (WDL): All ear disorder elements are within defined limits  Respiratory  Respiratory  Respiratory (WDL): All respiratory elements are within defined limits  Cardiovascular  Cardiovascular  Cardiovascular (WDL): All cardiovascular elements are within defined limits  Gastrointestinal  Gastrointestinal  Gastrointestinal (WDL): All gastrointestinal elements are within defined limits  Musculoskeletal  Musculoskeletal and Connective Tissue Disorders  Musculoskeletal & Connective (WDL): All musculoskeletal & connective elements are within defined limits  Integumentary  Integumentary  Integumentary (WDL): Exceptions to WDL (radiation dermatitis healing)  Neurological  Neurosensory  Neurosensory (WDL): All neurosensory elements are within defined limits  Genitourinary/Reproductive  Genitourinary  Genitourinary (WDL): All genitourinary  elements are within defined limits  Lymphatic  Lymph System Disorders  Lymph (WDL): All lymph elements are within defined limits  Pain  Pain Score: No Pain (0)  AUA Assessment                                                              Accompanied by  Accompanied By: self only    Objective:     PHYSICAL EXAMINATION:    /64 (BP Location: Right arm, Patient Position: Sitting, Cuff Size: Adult Regular)   Pulse 79   Temp 98.2  F (36.8  C) (Oral)   Resp 16   Wt 61.1 kg (134 lb 11.2 oz)   SpO2 99%   BMI 19.89 kg/m      Gen: Alert, in NAD  Pulm: No wheezing, stridor or respiratory distress  CV: Well-perfused, no cyanosis, no pedal edema  Abdominal: BS+, soft, nontender, nondistended, no hepatomegaly  Back: No step-offs or pain to palpation along the thoracolumbar spine  Rectal: Deferred  : Deferred  Musculoskeletal: Normal muscle bulk and tone  Skin: Normal color and turgor, skin we note radiation therapy field shows post therapy changes.  Neurologic: A/Ox3, CN II-XII intact, normal gait and station  Psychiatric: Appropriate mood and affect      Impression     The patient is a 40-year-old female with a diagnosis of left breast cancer, status post surgery followed by postop radiation therapy completed 4 weeks ago.  The patient has been recovering well with no evidence of recurrence.    Assessment & Plan:     1.  Continue long-term follow-up and ongoing care for her breast cancer was Dr. Rosa, med oncology and Dr. Sigrid Ramirez, breast surgeon as planned.    2.  Follow-up with radiation oncology as needed.      Face to face time  10 minutes with > 80% spent on consultation, education and coordination of care.    Kim Mayer MD, PhD  Department of Radiation Oncology   [facility]  Tel: [phone]  Page: [phone]    [facility]  [address]  [place] [address]     [facility]   [address] [place] [address]    CC:  Patient Care Team:  Rosemary Mata MD as  PCP - General (Family Medicine)  Kim Mayer MD as MD (Hematology & Oncology)  Charley Schafer NP as Nurse Practitioner (Hematology & Oncology)  Sheri Rosa MD as MD (Hematology & Oncology)  Ainsley Love, RN as Specialty Care Coordinator (Hematology & Oncology)  Kim Mayer MD as Assigned Cancer Care Provider      Patient here ambulatory for follow up s/p radiation for her breast cancer.  Patient started tamoxifen hormone therapy and states she is tolerating it well.  States skin has healed.  Seen by Dr. Mayer.  Plan return to clinic for follow up as directed by physician.       Again, thank you for allowing me to participate in the care of your patient.        Sincerely,        Kim Mayer MD

## 2021-09-22 NOTE — PROGRESS NOTES
[facility] Radiation Oncology Follow Up     Patient: Antonella Jamison  MRN: [id number]  Date of Service: 09/22/2021       DISEASE TREATED:  Left breast cancer, pathologic stage T1cN0(sn) M0, ER/TX positive, HER-2 negative, Oncotype DX score 11, status post lumpectomy and sentinel resection with negative margin.       TYPE OF RADIATION THERAPY ADMINISTERED:  5240 cGy in 20 treatments given from 7/28/2021-8/24/2021.      INTERVAL SINCE COMPLETION OF RADIATION THERAPY: 1 month.      SUBJECTIVE:  Ms. Jamison is a 40 year old female who has been in her usual state of good health until recently.  The patient presented with palpable abnormality of her left breast by self-examination for which she was a seeking further evaluation.  Patient then underwent mammogram followed by ultrasound study on 5/18/2021.  This reviewed 6 x 8 x 6 mm left breast mass at 12 o'clock position highly suspicious for breast cancer.  Patient then underwent core needle biopsy on 5/19/2021 with pathology confirmed invasive breast cancer, ER/TX positive, HER-2 negative.  After discussion with patient about various treatment options, the patient had to proceed with breast preservation protocol as her treatment choice and underwent lumpectomy and sentinel resection on 6/9/2021.  The pathology reviewed 15 x 11 x 9 mm invasive ductal carcinoma, [place] grade 1, with associated DCIS.  The margin was negative with the closest margin measured 0.8 mm posterior for invasive carcinoma and positive for DCIS.  Tumor removed sentinel lymph node showed no evidence of metastatic disease.  Patient was find to have significant hematoma and underwent evacuation of hematoma and the reexcision lumpectomy on 6/14/2021 with pathology showed no evidence of residual disease.  She has been recovering well since then.  Her Oncotype DX score is 11. The patient received postop radiation therapy for her breast cancer with a total dose of 5240 cGy in 20 treatments  given from 7/28/2021-8/24/2021.  She tolerated ration therapy very well with expected acute side effect.     The patient has been recovering well since completion of radiation therapy.  She denies any pain discomfort related to the therapy at time evaluation.  She is here for routine post therapy office follow-up.    Medications were reviewed and are up to date on EPIC.    The following portions of the patient's history were reviewed and updated as appropriate: allergies, current medications, past family history, past medical history, past social history, past surgical history and problem list.    Review of Systems:      General  Constitutional  Constitutional (WDL): All constitutional elements are within defined limits  EENT  Eye Disorders  Eye Disorder (WDL): All eye disorder elements are within defined limits  Ear Disorders  Ear Disorder (WDL): All ear disorder elements are within defined limits  Respiratory  Respiratory  Respiratory (WDL): All respiratory elements are within defined limits  Cardiovascular  Cardiovascular  Cardiovascular (WDL): All cardiovascular elements are within defined limits  Gastrointestinal  Gastrointestinal  Gastrointestinal (WDL): All gastrointestinal elements are within defined limits  Musculoskeletal  Musculoskeletal and Connective Tissue Disorders  Musculoskeletal & Connective (WDL): All musculoskeletal & connective elements are within defined limits  Integumentary  Integumentary  Integumentary (WDL): Exceptions to WDL (radiation dermatitis healing)  Neurological  Neurosensory  Neurosensory (WDL): All neurosensory elements are within defined limits  Genitourinary/Reproductive  Genitourinary  Genitourinary (WDL): All genitourinary elements are within defined limits  Lymphatic  Lymph System Disorders  Lymph (WDL): All lymph elements are within defined limits  Pain  Pain Score: No Pain (0)  AUA Assessment                                                              Accompanied  by  Accompanied By: self only    Objective:     PHYSICAL EXAMINATION:    /64 (BP Location: Right arm, Patient Position: Sitting, Cuff Size: Adult Regular)   Pulse 79   Temp 98.2  F (36.8  C) (Oral)   Resp 16   Wt 61.1 kg (134 lb 11.2 oz)   SpO2 99%   BMI 19.89 kg/m      Gen: Alert, in NAD  Pulm: No wheezing, stridor or respiratory distress  CV: Well-perfused, no cyanosis, no pedal edema  Abdominal: BS+, soft, nontender, nondistended, no hepatomegaly  Back: No step-offs or pain to palpation along the thoracolumbar spine  Rectal: Deferred  : Deferred  Musculoskeletal: Normal muscle bulk and tone  Skin: Normal color and turgor, skin we note radiation therapy field shows post therapy changes.  Neurologic: A/Ox3, CN II-XII intact, normal gait and station  Psychiatric: Appropriate mood and affect      Impression     The patient is a 40-year-old female with a diagnosis of left breast cancer, status post surgery followed by postop radiation therapy completed 4 weeks ago.  The patient has been recovering well with no evidence of recurrence.    Assessment & Plan:     1.  Continue long-term follow-up and ongoing care for her breast cancer was Dr. Rosa, med oncology and Dr. Sigrid Ramirez, breast surgeon as planned.    2.  Follow-up with radiation oncology as needed.      Face to face time  10 minutes with > 80% spent on consultation, education and coordination of care.    Kim Mayer MD, PhD  Department of Radiation Oncology   [facility]  Tel: [phone]  Page: [phone]    [facility]  [address] [place] [address]     [address] [place] [address]    CC:  Patient Care Team:  Rosemary Mata MD as PCP - General (Family Medicine)  Kim Mayer MD as MD (Hematology & Oncology)  Charley Schafer NP as Nurse Practitioner (Hematology & Oncology)  Sheri Rosa MD as MD (Hematology & Oncology)  Ainsley Love, RN as Specialty Care Coordinator  (Hematology & Oncology)  Kim Mayer MD as Assigned Cancer Care Provider

## 2021-09-22 NOTE — CONFIDENTIAL NOTE
Psychology Psychotherapy  Note-telephone visit    Name:  Antonella Jamison  :  1980  MRN:  [id number]      Date of Service: 2021  Duration: 45 minutes (11:00 to 11:45 AM)    The patient has been notified of following:      This telephone visit will be conducted via a call between you and your provider. We have found that certain health care needs can be provided without a face to face meeting.  This service lets us provide the care you need with a short phone conversation.      Telephone visits are billed at different rates depending on your insurance coverage. During this emergency period, for some insurers they may be billed the same as an in-person visit.  Please reach out to your insurance provider with any questions.     If during the course of the call the if provider feels a telephone visit is not appropriate, you will not be charged for this service.     Patient has given verbal consent to a Telephone visit? Yes      Target Symptoms:    The patient was seen in light of concerns regarding symptoms of anxiety and depression as evidenced by patient and staff report.    Participation:  The patient was able to participate and benefit from treatment as evidenced by her verbal expression of ideas and initiation of topics discussed.    Mental Status:    Mood/Affect:  normal affect  Suicidal Ideation:  absent  Homicidal Ideation:  absent  Thought process:  normal  Thought content:  Clear  Fund of Knowledge:  Sufficient  Attention/Concentration:  Normal  Language ability:  intact  Speech: normal  Memory:  recent and remote memory intact  Insight and Judgement:  good  Orientation:  self, place and time  Appearance: N/A-telephone visit  Eye Contact: N/A-telephone visit  Estimated IQ:  Average      Intervention:    Antonella was referred to me by Dr. Rosa for individual psychotherapy to help her deal with the emotional aspects of breast cancer survivorship.  Antonella has a diagnosis of stage Ia invasive ductal  carcinoma of the upper outer quadrant of the left female breast, ER positive, TN positive, HER-2 negative.  She had her first mammogram on 2021.  She had bone a lump in her left breast prior to scheduling her mammogram.  She had left breast lumpectomy and left sentinel node biopsy on 2021 she underwent genetic counseling and testing on 6/10/2021 and her test results were negative.  She has been receiving radiation therapy and has her final treatment tomorrow, 2021.  She started on tamoxifen 1 week ago, and so far has not had any other problems.  Antonella states that she received the genetic testing, due to her age, but also her mother  15 years ago of pancreatic cancer.  She also states that her aunts on her mother's side also have cancer histories.    Antonella was  for 11 years, and  in 2016, 5 years ago.  She states that her ex- is not in the picture at all.  She describes him as being a pathological liar and he had affairs throughout their entire marriage.  He also was involved in gambling and it sold money from her.  He has 2 other children from 2 different women.  Arely is a single mom to 7 year-old daughter, Orlando.  She will be 7 years old on 10/8/2021.  She was named after Arely's mother, who was Estrella and her father's middle name is Andreas.  Arely has been very open with her daughter about her cancer.  She has resources that been helpful in this area.  Her daughter has been doing school virtually since the pandemic, and will continue to do virtual school for the first semester, until things are clear related to the delta variant.  Arely indicates that she is not in a current relationship, she states that she tried dating a few years ago, but struggles with trust issues.    Since her cancer diagnosis, Arely has been working at making some life changes.  She has been a vegan for 7 years, and her daughter who is a vegan.  She recently joined the [employer], and is working out on a regular  basis.  She also talked with both Dr. Rosa and Dr. Ramirez about her drinking.  She had been drinking 2 to 3 glasses of wine a day.  She states that she has never been concerned about her drinking, but has been a social drinker.  She currently has cut to having 1 to 2 glass of wine a week.    Arely grew up in [facility].  She is the youngest in the family and has 2 older brothers.  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  When her brothers were off to college and she was 14 years old, her parents decided to sell their businesses and get involved in [employer] global missions.  They volunteered in [facility] for a year.  They then worked in [place] for 2 years.  They went back to [employer] for a number of years after that.  Arely's ex- is from [facility].    Antonella states that she has a very close relationship with her family.  She sees her dad on a regular basis.  He lives in [place] with his girlfriend.  He reacquainted with an old high school girlfriend.  Antonella struggles with this relationship, and tries to do things separately with her dad.  Her oldest brother is  and lives in [place].  He has 3 children ages 15, 13 and 9.  Her second oldest brother and his family are moving to [place] in the very near future.  He has twin 13-year-old boys.  Arely is very close to her brothers and their families and they spend a lot of time with each other.    Arely was raised [facility].  She has not been involved in a Pentecostalism or a long time.  She believes that she is a very spiritual person and believes in her life.    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    Arely and her daughter have a fairly new [place] puppy was sick and needed to be hospitalized for  several weeks.This added some extra stress in her family.  She states that he is doing good now, which is relieved both of them.    Arely was interested in individual psychotherapy to help her cope with her cancer and cancer survivorship.  We discussed various resources that would be helpful for her in her survivorship.  I sent her information about the upcoming survivorship series that will be offered through [facility] starting in September.  I also sent her information about [facility] in [place].  I also recommended several books regarding spirituality and healing.  She is trying to process her thoughts and feelings about her cancer journey at such a young age.  She will plan to start to look at her life priorities and the lessons that she has learned about life from this experience.    Psychoterapeutic Techniques:  Cognitive-behavioral therapy, motivational interviewing and supportive psychotherapy strategies were utilized.    Necessity:    The session was necessary for the care of the patient to address symptoms of depression and anxiety related to the patient's medical condition.    Progress:  Antonella has made good progress since our last session.  She is continuing to look at her lifestyle and some changes that she would like to make now that she is in her breast cancer survivorship, in order to alleviate some of the stress in her life.  She has decided that she wants to work 30 hours a week in her business.  She also has started taking the survivorship series offered through [facility].  She also has started some of the psychoeducational reading that I suggested and has found it to be helpful.  In addition, she has started doing yoga 3 days a week, which she also finds to be a good stress reliever.  We continue to discuss other strategies that might be helpful in this area.  She plans to look into the programs at [facility] to see about a Reiki healing class.  We also discussed some journaling strategies  that might be helpful for her.  She also will be looking into essential oils.  In addition, she signed up for a virtual 5K as of breast cancer benefit.  We also talked about how she can incorporate some of her relaxation and meditation strategies with her daughter, as a way to incorporate some of her healthy changes to both of their lives.    Antonella met with Dr. Rosa yesterday, 9/21/2021 as she noticed a lump on her right breast.  Dr. Rosa ordered a mammogram that is scheduled for 9/23/2021.  Arely also is scheduled to meet with Dr. Mayer at [address] [place] [address] for a follow-up appointment.  She feels that she is doing very well and her recovery from her cancer treatment.  She had thought that she had Covid, but later realized that she was fatigued as a side effect from her radiation therapy.  She feels that she is starting to do much better currently.    Plan:    1.  Arely will continue to attend the survivorship series that is offered through [facility] but started in September 2021.  Information about this program was emailed to her.    2.  Arely will look into some of the programs that are offered through [facility] in [place] as discussed.    3.  Arely will consider looking at some of the psychoeducational reading material that we discussed.    4.  Arely will consider some of the life lessons that she is learned through her cancer journey. She will think about evaluating some of her life lessons and priorities.    I.  Arely will follow up with me on 10/18/2021 at 1:00 PM for a telephone psychotherapy session.    Diagnosis:   Adjustment disorder with mixed emotional features  Malignant neoplasm of the central portion of the left female breast    Problem List:  Patient Active Problem List   Diagnosis     Malignant neoplasm of central portion of left breast in female, estrogen receptor positive (H)     Note: I have reevaluated the above information with Antonella and appropriate changes were made and additional information was added.   Other information was consistent from the note that was made on  8/23/2021.      This note was created with the help of Dragon dictation system.  Grammatical and typing errors are not intentional.     Review of long-term goals: Treatment plan was reviewed and updated.     Provider: Cyndi Dial MA, LP, LICSW    Date:  8/23/2021  Time:  3:53 PM

## 2021-09-22 NOTE — PROGRESS NOTES
Patient here ambulatory for follow up s/p radiation for her breast cancer.  Patient started tamoxifen hormone therapy and states she is tolerating it well.  States skin has healed.  Seen by Dr. Mayer.  Plan return to clinic for follow up as directed by physician.

## 2021-09-23 ENCOUNTER — HOSPITAL ENCOUNTER (OUTPATIENT)
Dept: ULTRASOUND IMAGING | Facility: CLINIC | Age: 41
End: 2021-09-23
Attending: INTERNAL MEDICINE
Payer: COMMERCIAL

## 2021-09-23 DIAGNOSIS — N63.12 LUMP IN UPPER INNER QUADRANT OF RIGHT BREAST: ICD-10-CM

## 2021-09-23 DIAGNOSIS — N64.89 BREAST ASYMMETRY: ICD-10-CM

## 2021-09-23 PROCEDURE — 76642 ULTRASOUND BREAST LIMITED: CPT | Mod: RT

## 2021-10-07 DIAGNOSIS — Z17.0 MALIGNANT NEOPLASM OF CENTRAL PORTION OF LEFT BREAST IN FEMALE, ESTROGEN RECEPTOR POSITIVE (H): ICD-10-CM

## 2021-10-07 DIAGNOSIS — C50.112 MALIGNANT NEOPLASM OF CENTRAL PORTION OF LEFT BREAST IN FEMALE, ESTROGEN RECEPTOR POSITIVE (H): ICD-10-CM

## 2021-10-07 RX ORDER — TAMOXIFEN CITRATE 20 MG/1
TABLET ORAL
Qty: 30 TABLET | Refills: 0 | Status: SHIPPED | OUTPATIENT
Start: 2021-10-07 | End: 2021-11-09

## 2021-10-07 NOTE — TELEPHONE ENCOUNTER
Refill request received from [facility] for tamoxifen.  Last refilled by Dr. Rosa on 9/7/21.  Quantity #30. No refills. Message sent to Dr. Rosa/CHINA Love RN

## 2021-10-10 DIAGNOSIS — C50.112 MALIGNANT NEOPLASM OF CENTRAL PORTION OF LEFT BREAST IN FEMALE, ESTROGEN RECEPTOR POSITIVE (H): ICD-10-CM

## 2021-10-10 DIAGNOSIS — Z17.0 MALIGNANT NEOPLASM OF CENTRAL PORTION OF LEFT BREAST IN FEMALE, ESTROGEN RECEPTOR POSITIVE (H): ICD-10-CM

## 2021-10-11 RX ORDER — TAMOXIFEN CITRATE 20 MG/1
TABLET ORAL
Qty: 30 TABLET | Refills: 0 | OUTPATIENT
Start: 2021-10-11

## 2021-10-11 NOTE — TELEPHONE ENCOUNTER
Refill request received from [facility] for tamoxifen.  Last refilled by Dr. Rosa on 10/7/21. Quantity #30. No refills.  Need to see how patient tolerates medication 1st before refills sent. Pharmacy notified. They said it was a duplicate request since patient on autorefill/CHINA Love RN

## 2021-10-16 ENCOUNTER — HEALTH MAINTENANCE LETTER (OUTPATIENT)
Age: 41
End: 2021-10-16

## 2021-10-18 ENCOUNTER — VIRTUAL VISIT (OUTPATIENT)
Dept: ONCOLOGY | Facility: CLINIC | Age: 41
End: 2021-10-18
Attending: SOCIAL WORKER
Payer: COMMERCIAL

## 2021-10-18 DIAGNOSIS — Z17.0 MALIGNANT NEOPLASM OF CENTRAL PORTION OF LEFT BREAST IN FEMALE, ESTROGEN RECEPTOR POSITIVE (H): ICD-10-CM

## 2021-10-18 DIAGNOSIS — F43.29 ADJUSTMENT DISORDER WITH MIXED EMOTIONAL FEATURES: Primary | ICD-10-CM

## 2021-10-18 DIAGNOSIS — C50.112 MALIGNANT NEOPLASM OF CENTRAL PORTION OF LEFT BREAST IN FEMALE, ESTROGEN RECEPTOR POSITIVE (H): ICD-10-CM

## 2021-10-18 PROCEDURE — 90837 PSYTX W PT 60 MINUTES: CPT | Mod: TEL,95 | Performed by: SOCIAL WORKER

## 2021-10-18 NOTE — CONFIDENTIAL NOTE
Psychology Psychotherapy  Note-telephone visit    Name:  Antonella Jamison  :  1980  MRN:  [id number]      Date of Service: 10/18/2021  Duration: 60 minutes (1:00 to 2:00 PM)    The patient has been notified of following:      This telephone visit will be conducted via a call between you and your provider. We have found that certain health care needs can be provided without a face to face meeting.  This service lets us provide the care you need with a short phone conversation.      Telephone visits are billed at different rates depending on your insurance coverage. During this emergency period, for some insurers they may be billed the same as an in-person visit.  Please reach out to your insurance provider with any questions.     If during the course of the call the if provider feels a telephone visit is not appropriate, you will not be charged for this service.     Patient has given verbal consent to a Telephone visit? Yes      Target Symptoms:    The patient was seen in light of concerns regarding symptoms of anxiety and depression as evidenced by patient and staff report.    Participation:  The patient was able to participate and benefit from treatment as evidenced by her verbal expression of ideas and initiation of topics discussed.    Mental Status:    Mood/Affect:  normal affect  Suicidal Ideation:  absent  Homicidal Ideation:  absent  Thought process:  normal  Thought content:  Clear  Fund of Knowledge:  Sufficient  Attention/Concentration:  Normal  Language ability:  intact  Speech: normal  Memory:  recent and remote memory intact  Insight and Judgement:  good  Orientation:  self, place and time  Appearance: N/A-telephone visit  Eye Contact: N/A-telephone visit  Estimated IQ:  Average      Intervention:    Antonella was referred to me by Dr. Rosa for individual psychotherapy to help her deal with the emotional aspects of breast cancer survivorship.  Antonella has a diagnosis of stage Ia invasive ductal  carcinoma of the upper outer quadrant of the left female breast, ER positive, FL positive, HER-2 negative.  She had her first mammogram on 2021.  She had bone a lump in her left breast prior to scheduling her mammogram.  She had left breast lumpectomy and left sentinel node biopsy on 2021 she underwent genetic counseling and testing on 6/10/2021 and her test results were negative.  She has been receiving radiation therapy and has her final treatment tomorrow, 2021.  She started on tamoxifen 1 week ago, and so far has not had any other problems.  Antonella states that she received the genetic testing, due to her age, but also her mother  15 years ago of pancreatic cancer.  She also states that her aunts on her mother's side also have cancer histories.    Antonella was  for 11 years, and  in 2016, 5 years ago.  She states that her ex- is not in the picture at all.  She has not had any contact with him in nearly 6 years.  She describes him as being a pathological liar and he had affairs throughout their entire marriage.  He also was involved in gambling and it sold money from her.  He has 2 other children from 2 different women.  Arely is a single mom to 8 year-old daughter, Orlando.  She was named after Arely's mother, who was Estrella and her father's middle name is Andreas.  Arely has been very open with her daughter about her cancer.  She has resources that been helpful in this area.  Her daughter has been doing school virtually since the pandemic, and will continue to do virtual school for the first semester, until things are clear related to the delta variant.  Arely indicates that she is not in a current relationship, she states that she tried dating a few years ago, but struggles with trust issues.    Since her cancer diagnosis, Arely has been working at making some life changes.  She has been a vegan for 7 years, and her daughter who is a vegan.  She recently joined the [employer], and is working  out on a regular basis.  She also talked with both Dr. Rosa and Dr. Ramirez about her drinking.  She had been drinking 2 to 3 glasses of wine a day.  She states that she has never been concerned about her drinking, but has been a social drinker.  She currently has cut to having 1 to 2 glass of wine a week.    Arely grew up in [facility] [place] [facility].  She is the youngest in the family and has 2 older brothers.  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  When her brothers were off to college and she was 14 years old, her parents decided to sell their businesses and get involved in [employer].  They volunteered in [facility] for a year.  They then worked in [place] for 2 years.  They went back to [place] [facility] for a number of years after that.  Arely's ex- is from [place].    Antonella states that she has a very close relationship with her family.  She sees her dad on a regular basis.  He lives in [place] with his girlfriend.  He reacquainted with an old high school girlfriend.  Antonella struggles with this relationship, and tries to do things separately with her dad.  Her oldest brother is  and lives in [place].  He has 3 children ages 15, 13 and 9.  Her second oldest brother and his family are moving to [place] in the very near future.  He has twin 13-year-old boys.  Arely is very close to her brothers and their families and they spend a lot of time with each other.    Arely was raised [facility].  She has not been involved in a Sikh or a long time.  She believes that she is a very spiritual person and believes in her life.    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    Arely and her daughter have a fairly new [place] puppy was sick and needed to be  "hospitalized for several weeks.This added some extra stress in her family.  She states that he is doing good now, which is relieved both of them.    Arely was interested in individual psychotherapy to help her cope with her cancer and cancer survivorship.  We discussed various resources that would be helpful for her in her survivorship.  I sent her information about the upcoming survivorship series that will be offered through [facility] starting in September.  I also sent her information about pathways in [place].  I also recommended several books regarding spirituality and healing.  She is trying to process her thoughts and feelings about her cancer journey at such a young age.  She will plan to start to look at her life priorities and the lessons that she has learned about life from this experience.    Psychoterapeutic Techniques:  Cognitive-behavioral therapy, motivational interviewing and supportive psychotherapy strategies were utilized.    Necessity:    The session was necessary for the care of the patient to address symptoms of depression and anxiety related to the patient's medical condition.    Progress:  Antonella has made good progress since our last session.  She is continuing to look at her lifestyle and some changes that she would like to make now that she is in her breast cancer survivorship, in order to alleviate some of the stress in her life.  She has decided that she wants to work 30 hours a week in her business.  She also has started taking the survivorship series offered through [facility].  She also has started some of the psychoeducational reading that I suggested and has found it to be helpful.  She currently is reading \"the anatomy of the spirit\"; a book on stress cycle; a book on chakra's; and a book about the benefits of essential oils.  In addition, she has started doing yoga 3 days a week, which she also finds to be a good stress reliever.  We continue to discuss other strategies " that might be helpful in this area.  She has looked into the programs at [facility].  She is scheduled for her orientation on 10/21/2021.  She hopes to take further classes on Reiki healing.  We also discussed some journaling strategies that might be helpful for her.  She also is looking into essential oils.  In addition, she signed up for a virtual 5K as of breast cancer benefit.  We also talked about how she can incorporate some of her relaxation and meditation strategies with her daughter, as a way to incorporate some of her healthy changes to both of their lives.  They are also looking forward to a family get away week with her entire family at [facility] in [facility].    Antonella met with Dr. Rosa on 9/21/2021 as she noticed a lump on her right breast.  Dr. Rosa ordered a mammogram that was scheduled for 9/23/2021.  The results were negative and it was believed that it was some fatty tissue.  Arely met with Dr. Mayer on 9/22/2021 for a follow-up appointment.  She feels that she is doing very well and her recovery from her cancer treatment.  She had thought that she had Covid, but later realized that she was fatigued as a side effect from her radiation therapy.  She feels that she is starting to do much better currently.    Plan:    1.  Arely will continue to attend the survivorship series that is offered through [facility] but started in September 2021.  Information about this program was emailed to her.    2.  Arely will look into some of the programs that are offered through [facility] in [place] as discussed.    3.  Arely will consider looking at some of the psychoeducational reading material that we discussed.    4.  Arely will consider some of the life lessons that she is learned through her cancer journey. She will think about evaluating some of her life lessons and priorities.    5.  Arely will follow up with me in 3 weeks for a virtual telephone visit.  I will ask our information coordinators to contact her to  schedule this visit.    Diagnosis:   Adjustment disorder with mixed emotional features  Malignant neoplasm of the central portion of the left female breast    Problem List:  Patient Active Problem List   Diagnosis     Malignant neoplasm of central portion of left breast in female, estrogen receptor positive (H)     Note: I have reevaluated the above information with Antonella and appropriate changes were made and additional information was added.  Other information was consistent from the note that was made on 9/22/2021.      This note was created with the help of Dragon dictation system.  Grammatical and typing errors are not intentional.     Review of long-term goals: Treatment plan was reviewed and updated.     Provider: Cyndi Dial MA, LP, LICSW    Date:  8/23/2021  Time:  3:53 PM

## 2021-11-02 ENCOUNTER — TRANSFERRED RECORDS (OUTPATIENT)
Dept: HEALTH INFORMATION MANAGEMENT | Facility: CLINIC | Age: 41
End: 2021-11-02
Payer: COMMERCIAL

## 2021-11-05 ENCOUNTER — APPOINTMENT (OUTPATIENT)
Dept: URBAN - METROPOLITAN AREA CLINIC 260 | Age: 41
Setting detail: DERMATOLOGY
End: 2021-11-06

## 2021-11-05 VITALS — WEIGHT: 135 LBS | HEIGHT: 69 IN

## 2021-11-05 DIAGNOSIS — L82.1 OTHER SEBORRHEIC KERATOSIS: ICD-10-CM

## 2021-11-05 DIAGNOSIS — L72.8 OTHER FOLLICULAR CYSTS OF THE SKIN AND SUBCUTANEOUS TISSUE: ICD-10-CM

## 2021-11-05 DIAGNOSIS — D22 MELANOCYTIC NEVI: ICD-10-CM

## 2021-11-05 DIAGNOSIS — D18.0 HEMANGIOMA: ICD-10-CM

## 2021-11-05 DIAGNOSIS — Z71.89 OTHER SPECIFIED COUNSELING: ICD-10-CM

## 2021-11-05 PROBLEM — D18.01 HEMANGIOMA OF SKIN AND SUBCUTANEOUS TISSUE: Status: ACTIVE | Noted: 2021-11-05

## 2021-11-05 PROBLEM — D28.0 BENIGN NEOPLASM OF VULVA: Status: ACTIVE | Noted: 2021-11-05

## 2021-11-05 PROBLEM — D22.5 MELANOCYTIC NEVI OF TRUNK: Status: ACTIVE | Noted: 2021-11-05

## 2021-11-05 PROCEDURE — OTHER COUNSELING: OTHER

## 2021-11-05 PROCEDURE — 99203 OFFICE O/P NEW LOW 30 MIN: CPT

## 2021-11-05 PROCEDURE — OTHER ADDITIONAL NOTES: OTHER

## 2021-11-05 PROCEDURE — OTHER MIPS QUALITY: OTHER

## 2021-11-05 ASSESSMENT — LOCATION DETAILED DESCRIPTION DERM
LOCATION DETAILED: SUPERIOR THORACIC SPINE
LOCATION DETAILED: INFERIOR THORACIC SPINE
LOCATION DETAILED: RIGHT LATERAL BUCCAL CHEEK

## 2021-11-05 ASSESSMENT — LOCATION SIMPLE DESCRIPTION DERM
LOCATION SIMPLE: RIGHT CHEEK
LOCATION SIMPLE: UPPER BACK

## 2021-11-05 ASSESSMENT — LOCATION ZONE DERM
LOCATION ZONE: TRUNK
LOCATION ZONE: FACE

## 2021-11-05 NOTE — PROCEDURE: COUNSELING
Detail Level: Generalized
Sunscreen Recommendations: Patient asked about removal. We can remove this mole but there?s always a chance of them coming back and there?s always a chance for a scar.
Detail Level: Zone
Detail Level: Detailed

## 2021-11-05 NOTE — PROCEDURE: ADDITIONAL NOTES
Detail Level: Detailed
Additional Notes: Photos taken. 4 mm punch removal right jaw line. Recommend Dr. Rudd to do the removal. A task will be sent to [facility] to confirm she can remove and then a nurse will call pt to help schedule/ confirm
Render Risk Assessment In Note?: no

## 2021-11-05 NOTE — PROCEDURE: MIPS QUALITY
Quality 226: Preventive Care And Screening: Tobacco Use: Screening And Cessation Intervention: Patient screened for tobacco use and is an ex/non-smoker
Detail Level: Detailed
Quality 431: Preventive Care And Screening: Unhealthy Alcohol Use - Screening: Patient not identified as an unhealthy alcohol user when screened for unhealthy alcohol use using a systematic screening method
Quality 130: Documentation Of Current Medications In The Medical Record: Current Medications Documented
Quality 110: Preventive Care And Screening: Influenza Immunization: Influenza Immunization previously received during influenza season

## 2021-11-08 ENCOUNTER — ONCOLOGY VISIT (OUTPATIENT)
Dept: ONCOLOGY | Facility: CLINIC | Age: 41
End: 2021-11-08
Attending: NURSE PRACTITIONER
Payer: COMMERCIAL

## 2021-11-08 ENCOUNTER — LAB (OUTPATIENT)
Dept: INFUSION THERAPY | Facility: CLINIC | Age: 41
End: 2021-11-08
Attending: SOCIAL WORKER
Payer: COMMERCIAL

## 2021-11-08 ENCOUNTER — VIRTUAL VISIT (OUTPATIENT)
Dept: ONCOLOGY | Facility: CLINIC | Age: 41
End: 2021-11-08
Attending: SOCIAL WORKER
Payer: COMMERCIAL

## 2021-11-08 VITALS
RESPIRATION RATE: 16 BRPM | OXYGEN SATURATION: 100 % | SYSTOLIC BLOOD PRESSURE: 106 MMHG | HEART RATE: 88 BPM | TEMPERATURE: 98.8 F | DIASTOLIC BLOOD PRESSURE: 74 MMHG | BODY MASS INDEX: 19.94 KG/M2 | WEIGHT: 135 LBS

## 2021-11-08 DIAGNOSIS — C50.112 MALIGNANT NEOPLASM OF CENTRAL PORTION OF LEFT BREAST IN FEMALE, ESTROGEN RECEPTOR POSITIVE (H): Primary | ICD-10-CM

## 2021-11-08 DIAGNOSIS — Z79.810 ENCOUNTER FOR MONITORING TAMOXIFEN THERAPY: ICD-10-CM

## 2021-11-08 DIAGNOSIS — C50.112 MALIGNANT NEOPLASM OF CENTRAL PORTION OF LEFT BREAST IN FEMALE, ESTROGEN RECEPTOR POSITIVE (H): ICD-10-CM

## 2021-11-08 DIAGNOSIS — Z17.0 MALIGNANT NEOPLASM OF CENTRAL PORTION OF LEFT BREAST IN FEMALE, ESTROGEN RECEPTOR POSITIVE (H): ICD-10-CM

## 2021-11-08 DIAGNOSIS — F43.29 ADJUSTMENT DISORDER WITH MIXED EMOTIONAL FEATURES: Primary | ICD-10-CM

## 2021-11-08 DIAGNOSIS — Z17.0 MALIGNANT NEOPLASM OF CENTRAL PORTION OF LEFT BREAST IN FEMALE, ESTROGEN RECEPTOR POSITIVE (H): Primary | ICD-10-CM

## 2021-11-08 DIAGNOSIS — Z51.81 ENCOUNTER FOR MONITORING TAMOXIFEN THERAPY: ICD-10-CM

## 2021-11-08 LAB
ALBUMIN SERPL-MCNC: 3.9 G/DL (ref 3.5–5)
ALP SERPL-CCNC: 36 U/L (ref 45–120)
ALT SERPL W P-5'-P-CCNC: 12 U/L (ref 0–45)
AST SERPL W P-5'-P-CCNC: 16 U/L (ref 0–40)
BILIRUB DIRECT SERPL-MCNC: 0.2 MG/DL
BILIRUB SERPL-MCNC: 0.7 MG/DL (ref 0–1)
PROT SERPL-MCNC: 7 G/DL (ref 6–8)

## 2021-11-08 PROCEDURE — G0463 HOSPITAL OUTPT CLINIC VISIT: HCPCS

## 2021-11-08 PROCEDURE — 99214 OFFICE O/P EST MOD 30 MIN: CPT | Performed by: NURSE PRACTITIONER

## 2021-11-08 PROCEDURE — 80076 HEPATIC FUNCTION PANEL: CPT

## 2021-11-08 PROCEDURE — 36415 COLL VENOUS BLD VENIPUNCTURE: CPT

## 2021-11-08 PROCEDURE — 90832 PSYTX W PT 30 MINUTES: CPT | Mod: TEL,95 | Performed by: SOCIAL WORKER

## 2021-11-08 PROCEDURE — 82040 ASSAY OF SERUM ALBUMIN: CPT

## 2021-11-08 ASSESSMENT — PAIN SCALES - GENERAL: PAINLEVEL: MILD PAIN (2)

## 2021-11-08 NOTE — PROGRESS NOTES
"Oncology Rooming Note    November 8, 2021 9:18 AM   Antonella Jamison is a 40 year old female who presents for:    Chief Complaint   Patient presents with     Breast Cancer     Initial Vitals: /74 (Patient Position: Sitting)   Pulse 88   Temp 98.8  F (37.1  C) (Oral)   Resp 16   Wt 61.2 kg (135 lb)   LMP 10/16/2021   SpO2 100%   BMI 19.94 kg/m   Estimated body mass index is 19.94 kg/m  as calculated from the following:    Height as of 9/21/21: 1.753 m (5' 9\").    Weight as of this encounter: 61.2 kg (135 lb). Body surface area is 1.73 meters squared.  Mild Pain (2) Comment: Data Unavailable   Patient's last menstrual period was 10/16/2021.  Allergies reviewed: Yes  Medications reviewed: Yes    Medications: MEDICATION REFILLS NEEDED TODAY. Provider was notified.  Pharmacy name entered into [employer]: [id number] [place] [id number] [address] AT [facility]    Clinical concerns:  Refill tamoxifen 20 mg. L side breast pain, also under the breast ( this is new) L axillary pain- lymph node.rated @ 2.      ROMERO ISSA              "

## 2021-11-08 NOTE — CONFIDENTIAL NOTE
Psychology Psychotherapy  Note-telephone visit    Name:  Antonella Jamison  :  1980  MRN:  [id number]      Date of Service: 2021  Duration: 30 minutes (11:00 to 11:30 AM)    The patient has been notified of following:      This telephone visit will be conducted via a call between you and your provider. We have found that certain health care needs can be provided without a face to face meeting.  This service lets us provide the care you need with a short phone conversation.      Telephone visits are billed at different rates depending on your insurance coverage. During this emergency period, for some insurers they may be billed the same as an in-person visit.  Please reach out to your insurance provider with any questions.     If during the course of the call the if provider feels a telephone visit is not appropriate, you will not be charged for this service.     Patient has given verbal consent to a Telephone visit? Yes      Target Symptoms:    The patient was seen in light of concerns regarding symptoms of anxiety and depression as evidenced by patient and staff report.    Participation:  The patient was able to participate and benefit from treatment as evidenced by her verbal expression of ideas and initiation of topics discussed.    Mental Status:    Mood/Affect:  normal affect  Suicidal Ideation:  absent  Homicidal Ideation:  absent  Thought process:  normal  Thought content:  Clear  Fund of Knowledge:  Sufficient  Attention/Concentration:  Normal  Language ability:  intact  Speech: normal  Memory:  recent and remote memory intact  Insight and Judgement:  good  Orientation:  self, place and time  Appearance: N/A-telephone visit  Eye Contact: N/A-telephone visit  Estimated IQ:  Average      Intervention:    Antonella was referred to me by Dr. Rosa for individual psychotherapy to help her deal with the emotional aspects of breast cancer survivorship.  Antonella has a diagnosis of stage Ia invasive ductal  carcinoma of the upper outer quadrant of the left female breast, ER positive, MI positive, HER-2 negative.  She had her first mammogram on 2021.  She had bone a lump in her left breast prior to scheduling her mammogram.  She had left breast lumpectomy and left sentinel node biopsy on 2021 she underwent genetic counseling and testing on 6/10/2021 and her test results were negative.  She has been receiving radiation therapy and has her final treatment was on 2021.  She started on tamoxifen, and so far has not had any other problems.  Antonella states that she received the genetic testing, due to her age, but also her mother  15 years ago of pancreatic cancer.  She also states that her aunts on her mother's side also have cancer histories.    Antonella was  for 11 years, and  in 2016, 5 years ago.  She states that her ex- is not in the picture at all.  She has not had any contact with him in nearly 6 years.  She describes him as being a pathological liar and he had affairs throughout their entire marriage.  He also was involved in gambling and it sold money from her.  He has 2 other children from 2 different women.  Arely is a single mom to 8 year-old daughter, Orlando.  She was named after Arely's mother, who was Estrella and her father's middle name is Andreas.  Arely has been very open with her daughter about her cancer.  She has resources that been helpful in this area.  Her daughter has been doing school virtually since the pandemic, and will continue to do virtual school for the first semester, until things are clear related to the delta variant.  Arely indicates that she is not in a current relationship, she states that she tried dating a few years ago, but struggles with trust issues.    Since her cancer diagnosis, Arely has been working at making some life changes.  She has been a vegan for 7 years, and her daughter who is a vegan.  She recently joined the [employer], and is working out on a  regular basis.  She also talked with both Dr. Rosa and Dr. Ramirez about her drinking.  She had been drinking 2 to 3 glasses of wine a day.  She states that she has never been concerned about her drinking, but has been a social drinker.  She currently has cut to having 1 to 2 glass of wine a week.    Arely grew up in [place].  She is the youngest in the family and has 2 older brothers.  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  When her brothers were off to college and she was 14 years old, her parents decided to sell their businesses and get involved in [employer] global missions.  They volunteered in [facility] for a year.  They then worked in [place] for 2 years.  They went back to [place] for a number of years after that.  Arely's ex- is from [place].    Antonella states that she has a very close relationship with her family.  She sees her dad on a regular basis.  He lives in [place] with his girlfriend.  He reacquainted with an old high school girlfriend.  Antonella struggles with this relationship, and tries to do things separately with her dad.  Her oldest brother is  and lives in [place].  He has 3 children ages 15, 13 and 9.  Her second oldest brother and his family are moving to [place] in the very near future.  He has twin 13-year-old boys.  Arely is very close to her brothers and their families and they spend a lot of time with each other.    Arely was raised [place] [place].  She has not been involved in a Oriental orthodox or a long time.  She believes that she is a very spiritual person and believes in her life.    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  She worked for a [employer] firm for few years, he decided to go into the business on her own.    Arely and her daughter have a fairly new [place] puppy was sick and needed to be  "hospitalized for several weeks.This added some extra stress in her family.  She states that he is doing good now, which is relieved both of them.    Arely was interested in individual psychotherapy to help her cope with her cancer and cancer survivorship.  We discussed various resources that would be helpful for her in her survivorship.  I sent her information about the upcoming survivorship series that will be offered through [facility] starting in September.  I also sent her information about pathways in [place].  I also recommended several books regarding spirituality and healing.  She is trying to process her thoughts and feelings about her cancer journey at such a young age.  She will plan to start to look at her life priorities and the lessons that she has learned about life from this experience.    Psychoterapeutic Techniques:  Cognitive-behavioral therapy, motivational interviewing and supportive psychotherapy strategies were utilized.    Necessity:    The session was necessary for the care of the patient to address symptoms of depression and anxiety related to the patient's medical condition.    Progress:  Antonella has made good progress since our last session.  She is continuing to look at her lifestyle and some changes that she would like to make now that she is in her breast cancer survivorship, in order to alleviate some of the stress in her life.  She has decided that she wants to work 30 hours a week in her business.  She has information about the survivorship series offered through [facility].  She also has started some of the psychoeducational reading that I suggested and has found it to be helpful.  She currently is reading \"the anatomy of the spirit\"; a book on stress cycle; a book on chakra's; and a book about the benefits of essential oils.  In addition, she has started doing yoga 3 days a week, which she also finds to be a good stress reliever.  We continue to discuss other strategies that " might be helpful in this area.  She has looked into the programs at [facility].  She had a Reiki session last week.  She also had a class about spiritual guides.  She is planning to take David Chi class in the near future. We also discussed some journaling strategies that might be helpful for her.  She has started doing meditation and she also has started using essential oils.  This past weekend, she completed a 5K for a breast cancer benefit. We also talked about how she can incorporate some of her relaxation and meditation strategies with her daughter, as a way to incorporate some of her healthy changes to both of their lives.  They had a wonderful family get away week with her entire family several weeks ago at a cabin in [place].  She is looking forward to some meaningful life adventures.  She is hoping to visit her brother in [place].  She also is considering going to [place] to visit her brother and his family over [place].  In March, she is hoping to take a trip with her daughter and her best friend to somewhere on the beach.    Antonella met with Chris Will CNP at 9:00 this morning.  She had a swollen lymph node and had some further testing done.  She was relieved to find out that everything is fine and that this may be a side effect from her treatment.  She also met with Dr. Rosa on 9/21/2021 as she noticed a lump on her right breast.  Dr. Rosa ordered a mammogram that was scheduled for 9/23/2021.  The results were negative and it was believed that it was some fatty tissue.  Arely met with Dr. Mayer on 9/22/2021 for a follow-up appointment.  She feels that she is doing very well and her recovery from her cancer treatment.  She had thought that she had Covid, but later realized that she was fatigued as a side effect from her radiation therapy.  She feels that she is starting to do much better currently.    Plan:    1.  Arely will consider attending the survivorship series that is offered through  [facility]. Information about this program was emailed to her.    2.  Arely will look into some of the programs that are offered through pathways in [place] as discussed.    3.  Arely will consider looking at some of the psychoeducational reading material that we discussed.    4.  Arely will consider some of the life lessons that she is learned through her cancer journey. She will think about evaluating some of her life lessons and priorities.    5.  Arely will contact me on an as-needed basis if she is interested in future appointments..    Diagnosis:   Adjustment disorder with mixed emotional features  Malignant neoplasm of the central portion of the left female breast    Problem List:  Patient Active Problem List   Diagnosis     Malignant neoplasm of central portion of left breast in female, estrogen receptor positive (H)     Note: I have reevaluated the above information with Antonella and appropriate changes were made and additional information was added.  Other information was consistent from the note that was made on 10/18/2021.      This note was created with the help of Dragon dictation system.  Grammatical and typing errors are not intentional.     Review of long-term goals: Treatment plan was reviewed and updated.     Provider: Cyndi Dial MA, LP, LICSW    Date:  8/23/2021  Time:  3:53 PM

## 2021-11-08 NOTE — LETTER
"    11/8/2021         RE: Antonella Jamison  [address] [facility] [address]        Dear Colleague,    Thank you for referring your patient, Antonella Jamison, to the [facility]. Please see a copy of my visit note below.    Oncology Rooming Note    November 8, 2021 9:18 AM   Antonella Jamison is a 40 year old female who presents for:    Chief Complaint   Patient presents with     Breast Cancer     Initial Vitals: /74 (Patient Position: Sitting)   Pulse 88   Temp 98.8  F (37.1  C) (Oral)   Resp 16   Wt 61.2 kg (135 lb)   LMP 10/16/2021   SpO2 100%   BMI 19.94 kg/m   Estimated body mass index is 19.94 kg/m  as calculated from the following:    Height as of 9/21/21: 1.753 m (5' 9\").    Weight as of this encounter: 61.2 kg (135 lb). Body surface area is 1.73 meters squared.  Mild Pain (2) Comment: Data Unavailable   Patient's last menstrual period was 10/16/2021.  Allergies reviewed: Yes  Medications reviewed: Yes    Medications: MEDICATION REFILLS NEEDED TODAY. Provider was notified.  Pharmacy name entered into [employer]: [id number] [place] [id number] [facility]  AT [facility]    Clinical concerns:  Refill tamoxifen 20 mg. L side breast pain, also under the breast ( this is new) L axillary pain- lymph node.rated @ 2.      ROMERO ISSA                [facility] Hematology and Oncology Progress Note    Patient: Antonella Jamison  MRN: [id number]  Date of Service: Nov 8, 2021         Reason for Visit:    Patient asked to be seen regarding (L) breast soreness and ? enlarged axillary LN     Assessment and Plan:    1. Stage IA, G1, ER & MI (+), HER2 (-) invasive ductal carcinoma central portion (L) breast ... ~5 months s/p lumpectomy, breast conservation EBRT on adjuvant tamoxifen therapy.    40 year old    Antonella presents alone.  She reports having felt a lump in the lateral (L) breast/axilla with some discomfort.  She otherwise feels well.  She takes " tamoxifen regularly with no concerning side effects other than some occasional night sweats, no real hot flashes.  No vaginal discharge.  She likes wine and admits to consuming ~8 glasses wine/month.  Her appetite, weight and performance status are very stable.  She denies cough, fever, chills, unusual headaches, visual or mentation disturbance, bowel or bladder issues, rash.    LFTs - basically WNL.    11/02/2021 (B) axillary US - LNs within normal limits.  No pathologic lymphadenopathy, mass or fluid collection.    Clinically and radiographically FER.  Tolerating adjuvant tamoxifen therapy acceptably well.  LFTS basically WNL.    Discussed with Antonella that her occasional discomfort in the (L) breast/axilla is likely paresthesia discomfort with nerves waking up now which is very common ~3 months s/p completing breast conservation EBRT.  Reassured her these types of sensations could continue for 3-6 more months, possibly longer.    Antonella now feels her (L) breast/arm discomfort could also be r/t being more aggressive with her yoga exercises.    The axillary finding Antonella points out to me is actually normal axillary structure.    Continue 20 mg daily adjuvant tamoxifen endocrine therapy.    Instructed on the need for yearly P&P through PCP.    4-month f/u with physical exam - no labs.    Diagnostic 1st year post-op mammogram in May, 2022.    Yearly LFTs - due 2022, November.    Oncologic History:    1. Invasive ductal carcinoma of the upper outer quadrant of the left breast.              15 mm, grade 1.              DCIS +              pT1c N0              ER positive (99%), MO positive (97%), HER-2/kelsey negative by IHC (1+).              Oncotype DX 11/3%/<1%    2021, May - self palpated a lump in her left breast.    05/18/21 mammogram followed by ultrasound - 6 x 8 x 6 mm left breast mass at 12 o'clock position highly suspicious for breast cancer.      05/19/21 core needle biopsy - pathology confirmed invasive breast  cancer, ER/UT positive, HER-2 negative.      06/09/2021 - (L) breast lumpectomy and left sentinel lymph node biopsy.      Pathology revealed a 15 x 11 x 9 mm invasive ductal carcinoma, [place] grade 1, with associated DCIS.  The margin was negative with the closest margin measured 0.8 mm posterior for invasive carcinoma and positive for DCIS.  [place] lymph node showed no evidence of metastatic disease.        06/14/2021 - significant hematoma and underwent evacuation with reexcision lumpectomy:    Pathology showed no evidence of residual disease.    08/24/2021 - completed 5240 cGy / 20 fx (L) breast conservation EBRT.    08/2021 - initiated adjuvant endocrine therapy with tamoxifen.    ECOG Performance:    0 - Independent    Pain:    Pain Score: Mild Pain (2)  Pain Loc: Breast (L side and L axillary pain at 2)    Encounter Diagnoses:    Problem List Items Addressed This Visit        Other    Malignant neoplasm of central portion of left breast in female, estrogen receptor positive (H) - Primary    Relevant Orders    Hepatic panel (Albumin, ALT, AST, Bili, Alk Phos, TP) (Completed)      Other Visit Diagnoses     Encounter for monitoring tamoxifen therapy        Relevant Orders    Hepatic panel (Albumin, ALT, AST, Bili, Alk Phos, TP) (Completed)             Total time 32 minutes.    Chris Will CNP     CC: Rosemary Mata MD   ____________________________________________________________________________    Review of Systems:    No fever or night sweats.  No loss of weight.  No lumps or bumps anywhere.  No unusual headaches or eyesight issues.  No dizziness.  No bleeding from the nose.  No sores in the mouth. No problems with swallowing.  No chest pain. No shortness of breath. No cough.  No abdominal pain. No nausea or vomiting.  No diarrhea or constipation.  No blood in stool or black colored stools.  No problems passing urine.  No numbness or tingling in hands or feet.  No skin rashes.    A 14 point review of  systems is otherwise negative.    Past History:    Past Medical History:   Diagnosis Date     Cancer (H)      Infectious viral hepatitis      Personal history of malaria        Past Surgical History:   Procedure Laterality Date     OTHER SURGICAL HISTORY      Hairy nevis     NV MASTECTOMY, PARTIAL Left 6/9/2021    Procedure: Left Lumpectomy (With MSC u/s): [place] Lymph Node Biopsy;  Surgeon: Sigrid Ramirez MD;  Location: [facility];  Service: General     NV MASTECTOMY, PARTIAL Left 6/14/2021    Procedure: Evacuation Hematoma, Re-excision Lumpectomy;  Surgeon: Sigrid Ramirez MD;  Location: [facility];  Service: General     US SENTINEL NODE INJECTION  6/9/2021     VAGINAL DELIVERY       WISDOM TOOTH EXTRACTION       ZZC NONSPECIFIC PROCEDURE      nevus removal     Physical Exam:    /74 (Patient Position: Sitting)   Pulse 88   Temp 98.8  F (37.1  C) (Oral)   Resp 16   Wt 61.2 kg (135 lb)   LMP 10/16/2021   SpO2 100%   BMI 19.94 kg/m      GENERAL:   Alert and oriented. Seated comfortably. In no distress.    HEENT:   Atraumatic and normocephalic.  GABINO.  EOMI.  No pallor.  No icterus.  No mucosal lesions.    LYMPH NODES:  No palpable cervical, axillary or inguinal lymphadenopathy.    CHEST:   Lungs clear to auscultation bilaterally.    BREASTS:  (L) c/w lumpectomy.  Well healed incision.  No concerning lumps, bumps, skin, nipple or axilla findings.     (R) - no concerning lumps, bumps, skin, nipple or axilla findings.     Declined offer of female  during exam.    CVS:    S1 and S2 heard.  Regular rate and rhythm.  No murmur or gallop or rub heard.  No peripheral edema.    ABDOMEN:   Soft. Not tender. Not distended.  No palpable hepatomegaly or splenomegaly.    EXTREMITIES:  Warm.    SKIN:    No rash, bruising or purpura noted.  Full head of hair.    Lab Results:    Reviewed with patient.    Recent Results (from the past 120 hour(s))   Hepatic panel (Albumin, ALT, AST, Bili, Alk  Phos, TP)    Collection Time: 11/08/21  9:28 AM   Result Value Ref Range    Bilirubin Total 0.7 0.0 - 1.0 mg/dL    Bilirubin Direct 0.2 <=0.5 mg/dL    Protein Total 7.0 6.0 - 8.0 g/dL    Albumin 3.9 3.5 - 5.0 g/dL    Alkaline Phosphatase 36 (L) 45 - 120 U/L    AST 16 0 - 40 U/L    ALT 12 0 - 45 U/L     Imaging:    No results found.          Again, thank you for allowing me to participate in the care of your patient.        Sincerely,        Chris Will, CNP

## 2021-11-08 NOTE — PROGRESS NOTES
[facility] Hematology and Oncology Progress Note    Patient: Antonella Jamison  MRN: [id number]  Date of Service: Nov 8, 2021         Reason for Visit:    Patient asked to be seen regarding (L) breast soreness and ? enlarged axillary LN     Assessment and Plan:    1. Stage IA, G1, ER & AK (+), HER2 (-) invasive ductal carcinoma central portion (L) breast ... ~5 months s/p lumpectomy, breast conservation EBRT on adjuvant tamoxifen therapy.    40 year old    Antonella presents alone.  She reports having felt a lump in the lateral (L) breast/axilla with some discomfort.  She otherwise feels well.  She takes tamoxifen regularly with no concerning side effects other than some occasional night sweats, no real hot flashes.  No vaginal discharge.  She likes wine and admits to consuming ~8 glasses wine/month.  Her appetite, weight and performance status are very stable.  She denies cough, fever, chills, unusual headaches, visual or mentation disturbance, bowel or bladder issues, rash.    LFTs - basically WNL.    11/02/2021 (B) axillary US - LNs within normal limits.  No pathologic lymphadenopathy, mass or fluid collection.    Clinically and radiographically FER.  Tolerating adjuvant tamoxifen therapy acceptably well.  LFTS basically WNL.    Discussed with Antonella that her occasional discomfort in the (L) breast/axilla is likely paresthesia discomfort with nerves waking up now which is very common ~3 months s/p completing breast conservation EBRT.  Reassured her these types of sensations could continue for 3-6 more months, possibly longer.    Antonella now feels her (L) breast/arm discomfort could also be r/t being more aggressive with her yoga exercises.    The axillary finding Antonella points out to me is actually normal axillary structure.    Continue 20 mg daily adjuvant tamoxifen endocrine therapy.    Instructed on the need for yearly P&P through PCP.    4-month f/u with physical exam - no labs.    Diagnostic 1st year post-op  mammogram in May, 2022.    Yearly LFTs - due 2022, November.    Oncologic History:    1. Invasive ductal carcinoma of the upper outer quadrant of the left breast.              15 mm, grade 1.              DCIS +              pT1c N0              ER positive (99%), HI positive (97%), HER-2/kelsey negative by IHC (1+).              Oncotype DX 11/3%/<1%    2021, May - self palpated a lump in her left breast.    05/18/21 mammogram followed by ultrasound - 6 x 8 x 6 mm left breast mass at 12 o'clock position highly suspicious for breast cancer.      05/19/21 core needle biopsy - pathology confirmed invasive breast cancer, ER/HI positive, HER-2 negative.      06/09/2021 - (L) breast lumpectomy and left sentinel lymph node biopsy.      Pathology revealed a 15 x 11 x 9 mm invasive ductal carcinoma, [place] grade 1, with associated DCIS.  The margin was negative with the closest margin measured 0.8 mm posterior for invasive carcinoma and positive for DCIS.  [place] lymph node showed no evidence of metastatic disease.        06/14/2021 - significant hematoma and underwent evacuation with reexcision lumpectomy:    Pathology showed no evidence of residual disease.    08/24/2021 - completed 5240 cGy / 20 fx (L) breast conservation EBRT.    08/2021 - initiated adjuvant endocrine therapy with tamoxifen.    ECOG Performance:    0 - Independent    Pain:    Pain Score: Mild Pain (2)  Pain Loc: Breast (L side and L axillary pain at 2)    Encounter Diagnoses:    Problem List Items Addressed This Visit        Other    Malignant neoplasm of central portion of left breast in female, estrogen receptor positive (H) - Primary    Relevant Orders    Hepatic panel (Albumin, ALT, AST, Bili, Alk Phos, TP) (Completed)      Other Visit Diagnoses     Encounter for monitoring tamoxifen therapy        Relevant Orders    Hepatic panel (Albumin, ALT, AST, Bili, Alk Phos, TP) (Completed)             Total time 32 minutes.    Chris Will, CNP      CC: Rosemary Mata MD   ____________________________________________________________________________    Review of Systems:    No fever or night sweats.  No loss of weight.  No lumps or bumps anywhere.  No unusual headaches or eyesight issues.  No dizziness.  No bleeding from the nose.  No sores in the mouth. No problems with swallowing.  No chest pain. No shortness of breath. No cough.  No abdominal pain. No nausea or vomiting.  No diarrhea or constipation.  No blood in stool or black colored stools.  No problems passing urine.  No numbness or tingling in hands or feet.  No skin rashes.    A 14 point review of systems is otherwise negative.    Past History:    Past Medical History:   Diagnosis Date     Cancer (H)      Infectious viral hepatitis      Personal history of malaria        Past Surgical History:   Procedure Laterality Date     OTHER SURGICAL HISTORY      Hairy nevis     NC MASTECTOMY, PARTIAL Left 6/9/2021    Procedure: Left Lumpectomy (With MSC u/s): [place] Lymph Node Biopsy;  Surgeon: Sigrid Ramirez MD;  Location: [facility];  Service: General     NC MASTECTOMY, PARTIAL Left 6/14/2021    Procedure: Evacuation Hematoma, Re-excision Lumpectomy;  Surgeon: Sigrid Ramirez MD;  Location: [facility];  Service: General      SENTINEL NODE INJECTION  6/9/2021     VAGINAL DELIVERY       WISDOM TOOTH EXTRACTION       ZZC NONSPECIFIC PROCEDURE      nevus removal     Physical Exam:    /74 (Patient Position: Sitting)   Pulse 88   Temp 98.8  F (37.1  C) (Oral)   Resp 16   Wt 61.2 kg (135 lb)   LMP 10/16/2021   SpO2 100%   BMI 19.94 kg/m      GENERAL:   Alert and oriented. Seated comfortably. In no distress.    HEENT:   Atraumatic and normocephalic.  GABINO.  EOMI.  No pallor.  No icterus.  No mucosal lesions.    LYMPH NODES:  No palpable cervical, axillary or inguinal lymphadenopathy.    CHEST:   Lungs clear to auscultation bilaterally.    BREASTS:  (L) c/w lumpectomy.  Well healed  incision.  No concerning lumps, bumps, skin, nipple or axilla findings.     (R) - no concerning lumps, bumps, skin, nipple or axilla findings.     Declined offer of female  during exam.    CVS:    S1 and S2 heard.  Regular rate and rhythm.  No murmur or gallop or rub heard.  No peripheral edema.    ABDOMEN:   Soft. Not tender. Not distended.  No palpable hepatomegaly or splenomegaly.    EXTREMITIES:  Warm.    SKIN:    No rash, bruising or purpura noted.  Full head of hair.    Lab Results:    Reviewed with patient.    Recent Results (from the past 120 hour(s))   Hepatic panel (Albumin, ALT, AST, Bili, Alk Phos, TP)    Collection Time: 11/08/21  9:28 AM   Result Value Ref Range    Bilirubin Total 0.7 0.0 - 1.0 mg/dL    Bilirubin Direct 0.2 <=0.5 mg/dL    Protein Total 7.0 6.0 - 8.0 g/dL    Albumin 3.9 3.5 - 5.0 g/dL    Alkaline Phosphatase 36 (L) 45 - 120 U/L    AST 16 0 - 40 U/L    ALT 12 0 - 45 U/L     Imaging:    No results found.

## 2021-11-30 ENCOUNTER — LAB REQUISITION (OUTPATIENT)
Dept: LAB | Facility: CLINIC | Age: 41
End: 2021-11-30

## 2021-11-30 DIAGNOSIS — Z00.00 ENCOUNTER FOR GENERAL ADULT MEDICAL EXAMINATION WITHOUT ABNORMAL FINDINGS: ICD-10-CM

## 2021-11-30 PROCEDURE — 80053 COMPREHEN METABOLIC PANEL: CPT | Performed by: FAMILY MEDICINE

## 2021-12-01 LAB
ALBUMIN SERPL-MCNC: 3.9 G/DL (ref 3.5–5)
ALP SERPL-CCNC: 45 U/L (ref 45–120)
ALT SERPL W P-5'-P-CCNC: 16 U/L (ref 0–45)
ANION GAP SERPL CALCULATED.3IONS-SCNC: 10 MMOL/L (ref 5–18)
AST SERPL W P-5'-P-CCNC: 18 U/L (ref 0–40)
BILIRUB SERPL-MCNC: 0.4 MG/DL (ref 0–1)
BUN SERPL-MCNC: 14 MG/DL (ref 8–22)
CALCIUM SERPL-MCNC: 9.1 MG/DL (ref 8.5–10.5)
CHLORIDE BLD-SCNC: 105 MMOL/L (ref 98–107)
CO2 SERPL-SCNC: 26 MMOL/L (ref 22–31)
CREAT SERPL-MCNC: 0.78 MG/DL (ref 0.6–1.1)
GFR SERPL CREATININE-BSD FRML MDRD: >90 ML/MIN/1.73M2
GLUCOSE BLD-MCNC: 89 MG/DL (ref 70–125)
POTASSIUM BLD-SCNC: 4.1 MMOL/L (ref 3.5–5)
PROT SERPL-MCNC: 6.8 G/DL (ref 6–8)
SODIUM SERPL-SCNC: 141 MMOL/L (ref 136–145)

## 2021-12-02 ENCOUNTER — APPOINTMENT (OUTPATIENT)
Dept: URBAN - METROPOLITAN AREA CLINIC 260 | Age: 41
Setting detail: DERMATOLOGY
End: 2021-12-06

## 2021-12-02 DIAGNOSIS — L73.8 OTHER SPECIFIED FOLLICULAR DISORDERS: ICD-10-CM

## 2021-12-02 DIAGNOSIS — L72.8 OTHER FOLLICULAR CYSTS OF THE SKIN AND SUBCUTANEOUS TISSUE: ICD-10-CM

## 2021-12-02 PROCEDURE — 11440 EXC FACE-MM B9+MARG 0.5 CM/<: CPT | Mod: 76

## 2021-12-02 PROCEDURE — OTHER EDUCATIONAL RESOURCES PROVIDED: OTHER

## 2021-12-02 PROCEDURE — OTHER PUNCH EXCISION: OTHER

## 2021-12-02 PROCEDURE — 11440 EXC FACE-MM B9+MARG 0.5 CM/<: CPT

## 2021-12-02 PROCEDURE — OTHER COUNSELING: OTHER

## 2021-12-02 ASSESSMENT — LOCATION SIMPLE DESCRIPTION DERM: LOCATION SIMPLE: RIGHT CHEEK

## 2021-12-02 ASSESSMENT — LOCATION DETAILED DESCRIPTION DERM
LOCATION DETAILED: RIGHT LATERAL BUCCAL CHEEK
LOCATION DETAILED: RIGHT SUPERIOR LATERAL BUCCAL CHEEK

## 2021-12-02 ASSESSMENT — LOCATION ZONE DERM: LOCATION ZONE: FACE

## 2021-12-02 NOTE — PROCEDURE: PUNCH EXCISION
Detail Level: Detailed
12 Mm Punch Excision Text: A 12 mm punch biopsy was used to excise the lesion to the level of the subcutaneous fat.  Blunt dissection was used to free the lesion from the surrounding tissues and the lesion was removed.
4 Mm Punch Excision Text: A 4 mm punch biopsy was used to excise the lesion to the level of the subcutaneous fat.  Blunt dissection was used to free the lesion from the surrounding tissues and the lesion was removed.
X Size Of Lesion Width In Cm (Optional): 0
Render Path Notes In Note?: No
Post-Care Instructions: I reviewed with the patient in detail post-care instructions. Patient is to keep the biopsy site dry overnight, and then apply bacitracin twice daily until healed. Patient may apply hydrogen peroxide soaks to remove any crusting.
5 Mm Punch Excision Text: A 5 mm punch was used to make an initial incision over the lesion.  After this overlying column of skin was removed, blunt dissection was used to free the lesion from the surrounding tissues and the lesion was extirpated through the surgical opening made by the punch biopsy.
1.5 Mm Punch Excision Text: A 1.5 mm punch was used to make an initial incision over the lesion.  After this overlying column of skin was removed, blunt dissection was used to free the lesion from the surrounding tissues and the lesion was extirpated through the surgical opening made by the punch biopsy.
Repair Type: None
Render Post-Care Instructions In Note?: yes
Epidermal Closure: simple interrupted
Intermediate Repair Preamble Text (Leave Blank If You Do Not Want): Undermining was performed with blunt dissection.
Anesthesia Type: 1% lidocaine with epinephrine
10 Mm Punch Excision Text: A 10 mm punch biopsy was used to excise the lesion to the level of the subcutaneous fat.  Blunt dissection was used to free the lesion from the surrounding tissues and the lesion was removed.
Epidermal Sutures: 5-0 Prolene
Repair Type: None (Simple)
3.5 Mm Punch Excision Text: A 3.5 mm punch biopsy was used to excise the lesion to the level of the subcutaneous fat.  Blunt dissection was used to free the lesion from the surrounding tissues and the lesion was removed.
3 Mm Punch Excision Text: A 3 mm punch biopsy was used to excise the lesion to the level of the subcutaneous fat.  Blunt dissection was used to free the lesion from the surrounding tissues and the lesion was removed.
Undermining Type: Entire Wound
4.5 Mm Punch Excision Text: A 4.5 mm punch was used to make an initial incision over the lesion.  After this overlying column of skin was removed, blunt dissection was used to free the lesion from the surrounding tissues and the lesion was extirpated through the surgical opening made by the punch biopsy.
Additional Anesthesia Volume In Cc: 6
4 Mm Punch Excision Text: A 4 mm punch was used to make an initial incision over the lesion.  After this overlying column of skin was removed, blunt dissection was used to free the lesion from the surrounding tissues and the lesion was extirpated through the surgical opening made by the punch biopsy.
Consent was obtained from the patient. The risks and benefits to therapy were discussed in detail. Specifically, the risks of infection, scarring, bleeding, prolonged wound healing, incomplete removal, allergy to anesthesia, nerve injury and recurrence were addressed. Prior to the procedure, the treatment site was clearly identified and confirmed by the patient. All components of Universal Protocol/PAUSE Rule completed.
Complex Repair Preamble Text (Leave Blank If You Do Not Want): Extensive wide undermining was performed.
Medical Necessity Clause: This procedure was medically necessary because the lesion that was treated was:
8 Mm Punch Excision Text: A 8 mm punch biopsy was used to excise the lesion to the level of the subcutaneous fat.  Blunt dissection was used to free the lesion from the surrounding tissues and the lesion was removed.
Size Of Lesion (*Required): 0.2
Number Of Epidermal Sutures (Optional): 1
2.5 Mm Punch Excision Text: A 2.5 mm punch biopsy was used to excise the lesion to the level of the subcutaneous fat.  Blunt dissection was used to free the lesion from the surrounding tissues and the lesion was removed.
12 Mm Punch Excision Text: A 12 mm punch was used to make an initial incision over the lesion.  After this overlying column of skin was removed, blunt dissection was used to free the lesion from the surrounding tissues and the lesion was extirpated through the surgical opening made by the punch biopsy.
Medical Necessity Clause: The excision was medically necessary because the lesion which was excised was
10 Mm Punch Excision Text: A 10 mm punch was used to make an initial incision over the lesion.  After this overlying column of skin was removed, blunt dissection was used to free the lesion from the surrounding tissues and the lesion was extirpated through the surgical opening made by the punch biopsy.
Estimated Blood Loss (Cc): minimal
Wound Dressings: a bandage
3.5 Mm Punch Excision Text: A 3.5 mm punch was used to make an initial incision over the lesion.  After this overlying column of skin was removed, blunt dissection was used to free the lesion from the surrounding tissues and the lesion was extirpated through the surgical opening made by the punch biopsy.
Nostril Rim Text: The closure involved the nostril rim.
Debridement Text: The wound edges were debrided prior to proceeding with the closure to facilitate wound healing.
Path Notes (To The Dermatopathologist): Please check margins.
2 Mm Punch Excision Text: A 2 mm punch biopsy was used to excise the lesion to the level of the subcutaneous fat.  Blunt dissection was used to free the lesion from the surrounding tissues and the lesion was removed.
1.5 Mm Punch Excision Text: A 1.5 mm punch biopsy was used to excise the lesion to the level of the subcutaneous fat.  Blunt dissection was used to free the lesion from the surrounding tissues and the lesion was removed.
8 Mm Punch Excision Text: A 8 mm punch was used to make an initial incision over the lesion.  After this overlying column of skin was removed, blunt dissection was used to free the lesion from the surrounding tissues and the lesion was extirpated through the surgical opening made by the punch biopsy.
Intermediate / Complex Repair - Final Wound Length In Cm: 0.4
3 Mm Punch Excision Text: A 3 mm punch was used to make an initial incision over the lesion.  After this overlying column of skin was removed, blunt dissection was used to free the lesion from the surrounding tissues and the lesion was extirpated through the surgical opening made by the punch biopsy.
Hemostasis: Pressure
7 Mm Punch Excision Text: A 7 mm punch biopsy was used to excise the lesion to the level of the subcutaneous fat.  Blunt dissection was used to free the lesion from the surrounding tissues and the lesion was removed.
Post-Care Instructions: I reviewed with the patient in detail post-care instructions. Patient is not to engage in any heavy lifting, exercise, or swimming for the next 14 days. Should the patient develop any fevers, chills, bleeding, severe pain patient will contact the office immediately.
Wound Care: Vaseline
Anesthesia Volume In Cc: 0.5
Intermediate / Complex Repair - Final Wound Length In Cm: 0.3
Suture Removal: 14 days
Excision Method: 4 mm Punch
7 Mm Punch Excision Text: A 7 mm punch was used to make an initial incision over the lesion.  After this overlying column of skin was removed, blunt dissection was used to free the lesion from the surrounding tissues and the lesion was extirpated through the surgical opening made by the punch biopsy.
Billing Type: Third-Party Bill
Vermilion Border Text: The closure involved the vermilion border.
6 Mm Punch Excision Text: A 6 mm punch biopsy was used to excise the lesion to the level of the subcutaneous fat.  Blunt dissection was used to free the lesion from the surrounding tissues and the lesion was removed.
Punch Size In Mm: 2
Dressing: Band-Aid
Retention Suture Text: Retention sutures were placed to support the closure and prevent dehiscence.
2.5 Mm Punch Excision Text: A 2.5 mm punch was used to make an initial incision over the lesion.  After this overlying column of skin was removed, blunt dissection was used to free the lesion from the surrounding tissues and the lesion was extirpated through the surgical opening made by the punch biopsy.
Purse String (Intermediate) Text: Given the location of the defect and the characteristics of the surrounding skin a purse string intermediate closure was deemed most appropriate.  Undermining was performed circumfirentially around the surgical defect.  A purse string suture was then placed and tightened.
Helical Rim Text: The closure involved the helical rim.
Suture Removal: 7 days
5 Mm Punch Excision Text: A 5 mm punch biopsy was used to excise the lesion to the level of the subcutaneous fat.  Blunt dissection was used to free the lesion from the surrounding tissues and the lesion was removed.
4.5 Mm Punch Excision Text: A 4.5 mm punch biopsy was used to excise the lesion to the level of the subcutaneous fat.  Blunt dissection was used to free the lesion from the surrounding tissues and the lesion was removed.
Notification Instructions: Patient will be notified of biopsy results. However, patient instructed to call the office if not contacted within 2 weeks.
Excision Depth: adipose tissue
6 Mm Punch Excision Text: A 6 mm punch was used to make an initial incision over the lesion.  After this overlying column of skin was removed, blunt dissection was used to free the lesion from the surrounding tissues and the lesion was extirpated through the surgical opening made by the punch biopsy.
2 Mm Punch Excision Text: A 2 mm punch was used to make an initial incision over the lesion.  After this overlying column of skin was removed, blunt dissection was used to free the lesion from the surrounding tissues and the lesion was extirpated through the surgical opening made by the punch biopsy.

## 2021-12-02 NOTE — HPI: PROCEDURE (PUNCH EXCISION)
Has The Growth Been Previously Biopsied?: has not been previously biopsied
Additional History: Patient is here today for a cyst excision.

## 2021-12-09 ENCOUNTER — APPOINTMENT (OUTPATIENT)
Dept: URBAN - METROPOLITAN AREA CLINIC 260 | Age: 41
Setting detail: DERMATOLOGY
End: 2021-12-09

## 2021-12-09 DIAGNOSIS — Z48.02 ENCOUNTER FOR REMOVAL OF SUTURES: ICD-10-CM

## 2021-12-09 PROCEDURE — OTHER SUTURE REMOVAL (GLOBAL PERIOD): OTHER

## 2021-12-09 ASSESSMENT — LOCATION SIMPLE DESCRIPTION DERM: LOCATION SIMPLE: RIGHT CHEEK

## 2021-12-09 ASSESSMENT — LOCATION DETAILED DESCRIPTION DERM
LOCATION DETAILED: RIGHT LATERAL BUCCAL CHEEK
LOCATION DETAILED: RIGHT CENTRAL BUCCAL CHEEK

## 2021-12-09 ASSESSMENT — LOCATION ZONE DERM: LOCATION ZONE: FACE

## 2021-12-09 NOTE — PROCEDURE: SUTURE REMOVAL (GLOBAL PERIOD)
Add 82704 Cpt? (Important Note: In 2017 The Use Of 23130 Is Being Tracked By Cms To Determine Future Global Period Reimbursement For Global Periods): no
Detail Level: Detailed
1.57

## 2022-01-06 ENCOUNTER — LAB REQUISITION (OUTPATIENT)
Dept: LAB | Facility: CLINIC | Age: 42
End: 2022-01-06
Payer: COMMERCIAL

## 2022-01-06 DIAGNOSIS — Z03.818 ENCOUNTER FOR OBSERVATION FOR SUSPECTED EXPOSURE TO OTHER BIOLOGICAL AGENTS RULED OUT: ICD-10-CM

## 2022-01-06 PROCEDURE — U0003 INFECTIOUS AGENT DETECTION BY NUCLEIC ACID (DNA OR RNA); SEVERE ACUTE RESPIRATORY SYNDROME CORONAVIRUS 2 (SARS-COV-2) (CORONAVIRUS DISEASE [COVID-19]), AMPLIFIED PROBE TECHNIQUE, MAKING USE OF HIGH THROUGHPUT TECHNOLOGIES AS DESCRIBED BY CMS-2020-01-R: HCPCS | Mod: ORL | Performed by: FAMILY MEDICINE

## 2022-01-07 LAB — SARS-COV-2 RNA RESP QL NAA+PROBE: NEGATIVE

## 2022-01-12 VITALS — WEIGHT: 140 LBS | BODY MASS INDEX: 20.73 KG/M2 | HEIGHT: 69 IN

## 2022-01-12 VITALS — HEIGHT: 69 IN | WEIGHT: 140 LBS | BODY MASS INDEX: 20.73 KG/M2

## 2022-01-21 ENCOUNTER — APPOINTMENT (OUTPATIENT)
Dept: URBAN - METROPOLITAN AREA CLINIC 255 | Age: 42
Setting detail: DERMATOLOGY
End: 2022-01-21

## 2022-01-21 VITALS — WEIGHT: 135 LBS | HEIGHT: 69 IN

## 2022-01-21 DIAGNOSIS — L72.8 OTHER FOLLICULAR CYSTS OF THE SKIN AND SUBCUTANEOUS TISSUE: ICD-10-CM

## 2022-01-21 PROCEDURE — 17110 DESTRUCT B9 LESION 1-14: CPT

## 2022-01-21 PROCEDURE — OTHER BENIGN DESTRUCTION (LASER): OTHER

## 2022-01-21 PROCEDURE — OTHER COUNSELING: OTHER

## 2022-01-21 PROCEDURE — OTHER BENIGN DESTRUCTION (PDL): OTHER

## 2022-01-21 ASSESSMENT — LOCATION ZONE DERM
LOCATION ZONE: FACE
LOCATION ZONE: FACE

## 2022-01-21 ASSESSMENT — LOCATION DETAILED DESCRIPTION DERM
LOCATION DETAILED: RIGHT INFERIOR CENTRAL MALAR CHEEK
LOCATION DETAILED: RIGHT INFERIOR CENTRAL MALAR CHEEK

## 2022-01-21 ASSESSMENT — LOCATION SIMPLE DESCRIPTION DERM
LOCATION SIMPLE: RIGHT CHEEK
LOCATION SIMPLE: RIGHT CHEEK

## 2022-01-21 NOTE — PROCEDURE: BENIGN DESTRUCTION (LASER)
Delay Time (Ms): 20
Cryogen Time (Ms): 10
Post-Procedure: Following the procedure vaseline and ice was applied to the treatment areas and post care was reviewed with the patient.
Fluence: 5
Post-Care Instructions: I reviewed with the patient in detail post-care instructions. Patient should stay away from the sun and wear sun protection until treated areas are fully healed.
Add 52 Modifier (Optional): no
Medical Necessity Clause: This procedure was medically necessary because the lesions that were treated were: caused by surgical removal of cyst.
Total Pulses: 2
Pre-Procedure: Prior to the procedure all persons present put on protective eyewear.
Medical Necessity Information: It is in your best interest to select a reason for this procedure from the list below. All of these items fulfill various CMS LCD requirements except the new and changing color options.
Consent: Written consent obtained, risks reviewed including but not limited to crusting, scabbing, blistering, scarring, darker or lighter pigmentary change, incidental hair removal, bruising, and/or incomplete removal.
Detail Level: Zone
Spot Size: 10 mm
Anesthesia Volume In Cc: 0
Pulse Duration: 10 ms
Laser Type: Vbeam 595nm

## 2022-02-05 ENCOUNTER — HEALTH MAINTENANCE LETTER (OUTPATIENT)
Age: 42
End: 2022-02-05

## 2022-02-07 PROBLEM — C50.112 MALIGNANT NEOPLASM OF CENTRAL PORTION OF LEFT BREAST IN FEMALE, ESTROGEN RECEPTOR POSITIVE (H): Status: ACTIVE | Noted: 2021-05-28

## 2022-02-07 PROBLEM — Z17.0 MALIGNANT NEOPLASM OF CENTRAL PORTION OF LEFT BREAST IN FEMALE, ESTROGEN RECEPTOR POSITIVE (H): Status: ACTIVE | Noted: 2021-05-28

## 2022-02-16 ENCOUNTER — MYC MEDICAL ADVICE (OUTPATIENT)
Dept: ONCOLOGY | Facility: CLINIC | Age: 42
End: 2022-02-16
Payer: COMMERCIAL

## 2022-02-16 NOTE — TELEPHONE ENCOUNTER
SURV care plan sent to [facility] for patient to review.   Will follow up with any questions and provide any resources needed.    Estrella HERRERA RN 2/16/2022 3:03 PM

## 2022-02-18 ENCOUNTER — APPOINTMENT (OUTPATIENT)
Dept: URBAN - METROPOLITAN AREA CLINIC 255 | Age: 42
Setting detail: DERMATOLOGY
End: 2022-02-20

## 2022-02-18 DIAGNOSIS — L91.0 HYPERTROPHIC SCAR: ICD-10-CM

## 2022-02-18 PROCEDURE — OTHER COUNSELING: OTHER

## 2022-02-18 PROCEDURE — OTHER PULSED-DYE LASER: OTHER

## 2022-02-18 ASSESSMENT — LOCATION SIMPLE DESCRIPTION DERM: LOCATION SIMPLE: RIGHT CHEEK

## 2022-02-18 ASSESSMENT — LOCATION ZONE DERM: LOCATION ZONE: FACE

## 2022-02-18 ASSESSMENT — LOCATION DETAILED DESCRIPTION DERM
LOCATION DETAILED: RIGHT INFERIOR LATERAL MALAR CHEEK
LOCATION DETAILED: RIGHT LATERAL BUCCAL CHEEK

## 2022-02-18 NOTE — HPI: SCAR (HYPERTROPHIC SCAR)
How Severe Is It?: mild
Is This A New Presentation, Or A Follow-Up?: Follow Up Hypertrophic Scar
This document is complete and the patient is ready for discharge.

## 2022-02-18 NOTE — PROCEDURE: PULSED-DYE LASER
Pulse Duration: .45 ms
Spot Size: 7 mm
Pulse Count (Optional): 3
Detail Level: Zone
Cryogen Time (Ms): 30
Pulse Duration: 10 ms
Laser Type: Pulsed-dye laser 595nm
Delay Time (Ms): 20
Consent: Written consent obtained, risks reviewed including but not limited to crusting, scabbing, blistering, scarring, darker or lighter pigmentary change, incidental hair removal, bruising, and/or incomplete removal.
Post-Procedure Care: Vaseline and ice applied. Post care reviewed with patient.
Post-Care Instructions: I reviewed with the patient in detail post-care instructions. Patient should stay away from the sun and wear sun protection until treated areas are fully healed.
Spot Size: 10 mm
Fluence In J/Cm2 (Optional): 4.0

## 2022-02-24 NOTE — TELEPHONE ENCOUNTER
Called Antonella today to discuss cancer treatment summary. Antonella did receive summary in [facility].   Patient stated she had question about getting next mammo done. Writer reviewed chart and message to  had been sent from patient to . Explained to patient that the message is being reviewed. Patient has appt next week with . Patient will discuss at appt with  about mammo order.   Writer reviewed summary with patient. We discussed late/long term side effects from tx's.   We discussed reporting any new signs or sx's.   We discussed resources available to patient if needed.   We discussed [employer] classes and upcoming Survivorship Conference 04/09.  Patient has writer's contact information. At this time Antonella stated she is doing fine.   Patient had no other questions.   Estrella Bhakta RN on 2/24/2022 at 11:36 AM

## 2022-03-07 NOTE — PROGRESS NOTES
[facility] Hematology and Oncology Progress Note    Patient: Antonella Jamison  MRN: [id number]  Date of Service: Mar 8, 2022         Reason for Visit    Chief Complaint   Patient presents with     Breast Cancer       Assessment and Plan    Cancer Staging  Malignant neoplasm of central portion of left breast in female, estrogen receptor positive (H)  Staging form: Breast, AJCC 8th Edition  - Pathologic stage from 6/30/2021: Stage IA (pT1c, pN0(sn), cM0, G1, ER+, DE+, HER2-, Oncotype DX score: 11) - Signed by Sheri Rosa MD on 9/27/2021  - Clinical: No stage assigned - Unsigned      ECOG Performance    0 - Independent     Pain         #.  Stage IA (pT1c N0 M0) invasive ductal carcinoma of the upper outer quadrant of the left breast, ER positive, DE positive, HER-2 negative.  Oncotype DX 11/3% / <1%.  #.  Premenopausal.     No clinical signs and symptoms clearly suggestive of breast cancer recurrence. Right breast US in 11/2021 was benign so it was very reassuring. The palpable glandular density are likely her breast anatomy without pathology.  Will obtain mammo in 6/2022 for 1 year post surgery.   Tolerates tamoxifen well.  Continue tamoxifen.   discussed about non-hormonal contraception such as barrier methods.   Follow-up clinical exam in 6 months.    #. Neck and low back pain, chronic   Undergoing physical therapy and stable/improving.     Encounter Diagnoses:    Problem List Items Addressed This Visit        Oncology Diagnoses    Malignant neoplasm of central portion of left breast in female, estrogen receptor positive (H) - Primary    Relevant Medications    tamoxifen (NOLVADEX) 20 MG tablet    Other Relevant Orders    MA Diagnostic Digital Bilateral             CC: Rosemary Mata MD   ______________________________________________________________________________  Diagnosis  5/2021-patient self palpated a lump in her left breast.   Invasive ductal carcinoma of the upper outer quadrant of the left  breast.   15 mm, grade 1.   DCIS +   pT1c N0   ER positive (99%), MO positive (97%), HER-2/kelsey negative by IHC (1+).   Oncotype DX 11/3%/<1%    Treatment to date  6/9/2021-left breast lumpectomy and left sentinel lymph node biopsy.    6/14/2021-underwent reexcision due to close margins.  7/28/2021-8/24/2021-adjuvant radiation to the breast 5240 cGy in 20 treatments.  (Dr. Mayer)  8/2021-initiated tamoxifen.    History of Present Illness    Ms. Antonella Jamison presented herself today for follow-up.   She had Covid in January. She felt a different lump in her right breast and right neck and wondering it was a concern. She takes tamoxifen regularly. Urinates more like 5-6 times a night but not persistent. She is getting PT for neck/low back pain. No other concerns.   LMP- 2/25/2022, noted cycle length are getting a little longer.    Review of systems  Apart from describing in HPI, the remainder of comprehensive ROS was negative.    Past History    Past Medical History:   Diagnosis Date     Cancer (H)      Infectious viral hepatitis      Personal history of malaria        Past Surgical History:   Procedure Laterality Date     OTHER SURGICAL HISTORY      Hairy nevis     MO MASTECTOMY, PARTIAL Left 6/9/2021    Procedure: Left Lumpectomy (With MSC u/s): [place] Lymph Node Biopsy;  Surgeon: Sigrid Ramirez MD;  Location: [facility];  Service: General     MO MASTECTOMY, PARTIAL Left 6/14/2021    Procedure: Evacuation Hematoma, Re-excision Lumpectomy;  Surgeon: Sigrid Ramirez MD;  Location: [facility];  Service: General     US SENTINEL NODE INJECTION  6/9/2021     VAGINAL DELIVERY       WISDOM TOOTH EXTRACTION       ZZC NONSPECIFIC PROCEDURE      nevus removal         Physical Exam    /69 (Patient Position: Sitting)   Pulse 80   Temp 98.6  F (37  C) (Oral)   Resp 16   Wt 63.9 kg (140 lb 14.4 oz)   LMP 02/25/2022   SpO2 100%   BMI 20.81 kg/m      General: alert, awake, not in acute  distress  HEENT: Head: Normal, normocephalic, atraumatic.  Eye: Normal external eye, conjunctiva, lids cornea, GABINO.  Nose: Normal external nose, mucus membranes and septum.  Pharynx: Normal buccal mucosa. Normal pharynx.  Neck / Thyroid: Supple, no masses, nodes, nodules or enlargement.  Lymphatics: No abnormally enlarged lymph nodes. A very small about 0.5 cm nodule in the right neck.  Chest: Normal chest wall and respirations. Clear to auscultation.  Breasts: dense fibroglandular breasts bilaterally.    Heart: S1 S2 RRR, no murmur.   Abdomen: abdomen is soft without significant tenderness, masses, organomegaly or guarding  Extremities: normal strength, tone, and muscle mass  Skin: normal. no rash or abnormalities  CNS: non focal.    Lab Results    No results found for this or any previous visit (from the past 168 hour(s)).    Imaging    No results found.     I spent 30 minutes on the date of service, of which greater than 50% of the time was spent on counseling of the patent about the above medical conditions, educating patient on the medical conditions, treatment plan and support as well as coordination of care.    Signed by: Sheri Rosa MD

## 2022-03-08 ENCOUNTER — ONCOLOGY VISIT (OUTPATIENT)
Dept: ONCOLOGY | Facility: CLINIC | Age: 42
End: 2022-03-08
Attending: NURSE PRACTITIONER
Payer: COMMERCIAL

## 2022-03-08 VITALS
WEIGHT: 140.9 LBS | TEMPERATURE: 98.6 F | RESPIRATION RATE: 16 BRPM | OXYGEN SATURATION: 100 % | SYSTOLIC BLOOD PRESSURE: 112 MMHG | DIASTOLIC BLOOD PRESSURE: 69 MMHG | HEART RATE: 80 BPM | BODY MASS INDEX: 20.81 KG/M2

## 2022-03-08 DIAGNOSIS — C50.112 MALIGNANT NEOPLASM OF CENTRAL PORTION OF LEFT BREAST IN FEMALE, ESTROGEN RECEPTOR POSITIVE (H): Primary | ICD-10-CM

## 2022-03-08 DIAGNOSIS — Z17.0 MALIGNANT NEOPLASM OF CENTRAL PORTION OF LEFT BREAST IN FEMALE, ESTROGEN RECEPTOR POSITIVE (H): Primary | ICD-10-CM

## 2022-03-08 PROCEDURE — 99214 OFFICE O/P EST MOD 30 MIN: CPT | Performed by: INTERNAL MEDICINE

## 2022-03-08 PROCEDURE — G0463 HOSPITAL OUTPT CLINIC VISIT: HCPCS

## 2022-03-08 NOTE — PROGRESS NOTES
"Oncology Rooming Note    March 8, 2022 9:07 AM   Antonella Jamison is a 41 year old female who presents for:    Chief Complaint   Patient presents with     Breast Cancer     Initial Vitals: /69 (Patient Position: Sitting)   Pulse 80   Temp 98.6  F (37  C) (Oral)   Resp 16   Wt 63.9 kg (140 lb 14.4 oz)   LMP 02/25/2022   SpO2 100%   BMI 20.81 kg/m   Estimated body mass index is 20.81 kg/m  as calculated from the following:    Height as of 9/21/21: 1.753 m (5' 9\").    Weight as of this encounter: 63.9 kg (140 lb 14.4 oz). Body surface area is 1.76 meters squared.  Data Unavailable Comment: discomfort axiilla   Patient's last menstrual period was 02/25/2022.  Allergies reviewed: Yes  Medications reviewed: Yes    Medications: Medication refills not needed today.  Pharmacy name entered into [facility]: [id number] [address] [facility] [place] [facility]    Clinical concerns:  Tenderness in axilla with movement.       Radha Galvez LPN              "

## 2022-03-08 NOTE — LETTER
3/8/2022         RE: Antonella Jamison  [address] [address]        Dear Colleague,    Thank you for referring your patient, Antonella Jamison, to the [facility]. Please see a copy of my visit note below.    [facility] Hematology and Oncology Progress Note    Patient: Antonella Jamison  MRN: [id number]  Date of Service: Mar 8, 2022         Reason for Visit    Chief Complaint   Patient presents with     Breast Cancer       Assessment and Plan    Cancer Staging  Malignant neoplasm of central portion of left breast in female, estrogen receptor positive (H)  Staging form: Breast, AJCC 8th Edition  - Pathologic stage from 6/30/2021: Stage IA (pT1c, pN0(sn), cM0, G1, ER+, MS+, HER2-, Oncotype DX score: 11) - Signed by Sheri Rosa MD on 9/27/2021  - Clinical: No stage assigned - Unsigned      ECOG Performance    0 - Independent     Pain         #.  Stage IA (pT1c N0 M0) invasive ductal carcinoma of the upper outer quadrant of the left breast, ER positive, MS positive, HER-2 negative.  Oncotype DX 11/3% / <1%.  #.  Premenopausal.     No clinical signs and symptoms clearly suggestive of breast cancer recurrence. Right breast US in 11/2021 was benign so it was very reassuring. The palpable glandular density are likely her breast anatomy without pathology.  Will obtain mammo in 6/2022 for 1 year post surgery.   Tolerates tamoxifen well.  Continue tamoxifen.   discussed about non-hormonal contraception such as barrier methods.   Follow-up clinical exam in 6 months.    #. Neck and low back pain, chronic   Undergoing physical therapy and stable/improving.     Encounter Diagnoses:    Problem List Items Addressed This Visit        Oncology Diagnoses    Malignant neoplasm of central portion of left breast in female, estrogen receptor positive (H) - Primary    Relevant Medications    tamoxifen (NOLVADEX) 20 MG tablet    Other Relevant Orders    MA Diagnostic Digital Bilateral              CC: Rosemary Mata MD   ______________________________________________________________________________  Diagnosis  5/2021-patient self palpated a lump in her left breast.   Invasive ductal carcinoma of the upper outer quadrant of the left breast.   15 mm, grade 1.   DCIS +   pT1c N0   ER positive (99%), MO positive (97%), HER-2/kelsey negative by IHC (1+).   Oncotype DX 11/3%/<1%    Treatment to date  6/9/2021-left breast lumpectomy and left sentinel lymph node biopsy.    6/14/2021-underwent reexcision due to close margins.  7/28/2021-8/24/2021-adjuvant radiation to the breast 5240 cGy in 20 treatments.  (Dr. Mayer)  8/2021-initiated tamoxifen.    History of Present Illness    Ms. Antonella Jamison presented herself today for follow-up.   She had Covid in January. She felt a different lump in her right breast and right neck and wondering it was a concern. She takes tamoxifen regularly. Urinates more like 5-6 times a night but not persistent. She is getting PT for neck/low back pain. No other concerns.   LMP- 2/25/2022, noted cycle length are getting a little longer.    Review of systems  Apart from describing in HPI, the remainder of comprehensive ROS was negative.    Past History    Past Medical History:   Diagnosis Date     Cancer (H)      Infectious viral hepatitis      Personal history of malaria        Past Surgical History:   Procedure Laterality Date     OTHER SURGICAL HISTORY      Hairy nevis     MO MASTECTOMY, PARTIAL Left 6/9/2021    Procedure: Left Lumpectomy (With MSC u/s): [place] Lymph Node Biopsy;  Surgeon: Sigrid Ramirez MD;  Location: [facility];  Service: General     MO MASTECTOMY, PARTIAL Left 6/14/2021    Procedure: Evacuation Hematoma, Re-excision Lumpectomy;  Surgeon: Sigrid Ramirez MD;  Location: [facility];  Service: General     US SENTINEL NODE INJECTION  6/9/2021     VAGINAL DELIVERY       WISDOM TOOTH EXTRACTION       ZZC NONSPECIFIC PROCEDURE      nevus removal  "        Physical Exam    /69 (Patient Position: Sitting)   Pulse 80   Temp 98.6  F (37  C) (Oral)   Resp 16   Wt 63.9 kg (140 lb 14.4 oz)   LMP 02/25/2022   SpO2 100%   BMI 20.81 kg/m      General: alert, awake, not in acute distress  HEENT: Head: Normal, normocephalic, atraumatic.  Eye: Normal external eye, conjunctiva, lids cornea, GABINO.  Nose: Normal external nose, mucus membranes and septum.  Pharynx: Normal buccal mucosa. Normal pharynx.  Neck / Thyroid: Supple, no masses, nodes, nodules or enlargement.  Lymphatics: No abnormally enlarged lymph nodes. A very small about 0.5 cm nodule in the right neck.  Chest: Normal chest wall and respirations. Clear to auscultation.  Breasts: dense fibroglandular breasts bilaterally.    Heart: S1 S2 RRR, no murmur.   Abdomen: abdomen is soft without significant tenderness, masses, organomegaly or guarding  Extremities: normal strength, tone, and muscle mass  Skin: normal. no rash or abnormalities  CNS: non focal.    Lab Results    No results found for this or any previous visit (from the past 168 hour(s)).    Imaging    No results found.     I spent 30 minutes on the date of service, of which greater than 50% of the time was spent on counseling of the patent about the above medical conditions, educating patient on the medical conditions, treatment plan and support as well as coordination of care.    Signed by: Sheri Rosa MD    Oncology Rooming Note    March 8, 2022 9:07 AM   Antonella Jamison is a 41 year old female who presents for:    Chief Complaint   Patient presents with     Breast Cancer     Initial Vitals: /69 (Patient Position: Sitting)   Pulse 80   Temp 98.6  F (37  C) (Oral)   Resp 16   Wt 63.9 kg (140 lb 14.4 oz)   LMP 02/25/2022   SpO2 100%   BMI 20.81 kg/m   Estimated body mass index is 20.81 kg/m  as calculated from the following:    Height as of 9/21/21: 1.753 m (5' 9\").    Weight as of this encounter: 63.9 kg (140 lb 14.4 oz). " Body surface area is 1.76 meters squared.  Data Unavailable Comment: discomfort axiilla   Patient's last menstrual period was 02/25/2022.  Allergies reviewed: Yes  Medications reviewed: Yes    Medications: Medication refills not needed today.  Pharmacy name entered into [facility]: [facility] #[id number] [address]  AT [facility]    Clinical concerns:  Tenderness in axilla with movement.       Radha Galvez LPN                  Again, thank you for allowing me to participate in the care of your patient.        Sincerely,        Sheri Rosa MD

## 2022-03-12 RX ORDER — TAMOXIFEN CITRATE 20 MG/1
20 TABLET ORAL DAILY
Qty: 90 TABLET | Refills: 3 | Status: SHIPPED | OUTPATIENT
Start: 2022-03-12 | End: 2023-05-02

## 2022-04-06 ENCOUNTER — LAB REQUISITION (OUTPATIENT)
Dept: LAB | Facility: CLINIC | Age: 42
End: 2022-04-06

## 2022-04-06 DIAGNOSIS — Z11.3 ENCOUNTER FOR SCREENING FOR INFECTIONS WITH A PREDOMINANTLY SEXUAL MODE OF TRANSMISSION: ICD-10-CM

## 2022-04-06 PROCEDURE — 87389 HIV-1 AG W/HIV-1&-2 AB AG IA: CPT | Performed by: PHYSICIAN ASSISTANT

## 2022-04-06 PROCEDURE — 86780 TREPONEMA PALLIDUM: CPT | Performed by: PHYSICIAN ASSISTANT

## 2022-04-07 LAB
HIV 1+2 AB+HIV1 P24 AG SERPL QL IA: NEGATIVE
T PALLIDUM AB SER QL: NONREACTIVE

## 2022-04-22 ENCOUNTER — APPOINTMENT (OUTPATIENT)
Dept: URBAN - METROPOLITAN AREA CLINIC 255 | Age: 42
Setting detail: DERMATOLOGY
End: 2022-04-25

## 2022-04-22 DIAGNOSIS — L91.0 HYPERTROPHIC SCAR: ICD-10-CM

## 2022-04-22 PROCEDURE — OTHER COUNSELING: OTHER

## 2022-04-22 PROCEDURE — OTHER PULSED-DYE LASER: OTHER

## 2022-04-22 ASSESSMENT — LOCATION DETAILED DESCRIPTION DERM
LOCATION DETAILED: RIGHT LATERAL BUCCAL CHEEK
LOCATION DETAILED: RIGHT INFERIOR LATERAL MALAR CHEEK

## 2022-04-22 ASSESSMENT — LOCATION ZONE DERM: LOCATION ZONE: FACE

## 2022-04-22 ASSESSMENT — LOCATION SIMPLE DESCRIPTION DERM: LOCATION SIMPLE: RIGHT CHEEK

## 2022-04-22 NOTE — PROCEDURE: PULSED-DYE LASER
Post-Care Instructions: I reviewed with the patient in detail post-care instructions. Patient should stay away from the sun and wear sun protection until treated areas are fully healed.
Delay Time (Ms): 20
Pulse Duration: .45 ms
Pulse Duration: 10 ms
Spot Size: 7 mm
Cryogen Time (Ms): 30
Consent: Written consent obtained, risks reviewed including but not limited to crusting, scabbing, blistering, scarring, darker or lighter pigmentary change, incidental hair removal, bruising, and/or incomplete removal.
Post-Procedure Care: Vaseline and ice applied. Post care reviewed with patient.
Fluence In J/Cm2 (Optional): 4.0
Detail Level: Zone
Laser Type: Pulsed-dye laser 595nm
Pulse Count (Optional): 3
Spot Size: 10 mm

## 2022-05-20 ENCOUNTER — LAB REQUISITION (OUTPATIENT)
Dept: LAB | Facility: CLINIC | Age: 42
End: 2022-05-20

## 2022-05-20 ENCOUNTER — APPOINTMENT (OUTPATIENT)
Dept: URBAN - METROPOLITAN AREA CLINIC 255 | Age: 42
Setting detail: DERMATOLOGY
End: 2022-05-21

## 2022-05-20 DIAGNOSIS — L91.0 HYPERTROPHIC SCAR: ICD-10-CM

## 2022-05-20 DIAGNOSIS — N30.00 ACUTE CYSTITIS WITHOUT HEMATURIA: ICD-10-CM

## 2022-05-20 PROCEDURE — OTHER MIPS QUALITY: OTHER

## 2022-05-20 PROCEDURE — OTHER ADDITIONAL NOTES: OTHER

## 2022-05-20 PROCEDURE — 99212 OFFICE O/P EST SF 10 MIN: CPT

## 2022-05-20 PROCEDURE — 87086 URINE CULTURE/COLONY COUNT: CPT | Performed by: PHYSICIAN ASSISTANT

## 2022-05-20 PROCEDURE — OTHER COUNSELING: OTHER

## 2022-05-20 NOTE — PROCEDURE: ADDITIONAL NOTES
Detail Level: Simple
Additional Notes: It was discussed with patient that scar messaging it will be helpful because patient verbalized that she was doing that and she saw a difference. Patient states that she feels the injections and laser treatments have had very little effect in the outcome that she would like to achieve. It was discussed that if in 2 months she is not seeing the improvement she wants from the scar messaging, it was advised for her to go see Dr. Dye for further evaluation and treatment and the patient is familiar with Dr. Dye as she has surgeries done with him in the past. Patient agreed.
Render Risk Assessment In Note?: no

## 2022-05-23 LAB — BACTERIA UR CULT: ABNORMAL

## 2022-06-01 ENCOUNTER — HOSPITAL ENCOUNTER (OUTPATIENT)
Dept: MAMMOGRAPHY | Facility: CLINIC | Age: 42
Discharge: HOME | End: 2022-06-01
Attending: INTERNAL MEDICINE | Admitting: INTERNAL MEDICINE
Payer: COMMERCIAL

## 2022-06-01 DIAGNOSIS — C50.112 MALIGNANT NEOPLASM OF CENTRAL PORTION OF LEFT BREAST IN FEMALE, ESTROGEN RECEPTOR POSITIVE (H): ICD-10-CM

## 2022-06-01 DIAGNOSIS — Z17.0 MALIGNANT NEOPLASM OF CENTRAL PORTION OF LEFT BREAST IN FEMALE, ESTROGEN RECEPTOR POSITIVE (H): ICD-10-CM

## 2022-06-01 PROCEDURE — 77062 BREAST TOMOSYNTHESIS BI: CPT

## 2022-06-03 ENCOUNTER — OFFICE VISIT (OUTPATIENT)
Dept: SURGERY | Facility: CLINIC | Age: 42
End: 2022-06-03
Attending: FAMILY MEDICINE
Payer: COMMERCIAL

## 2022-06-03 DIAGNOSIS — Z85.3 PERSONAL HISTORY OF BREAST CANCER: Primary | ICD-10-CM

## 2022-06-03 PROCEDURE — G0463 HOSPITAL OUTPT CLINIC VISIT: HCPCS

## 2022-06-03 PROCEDURE — 99212 OFFICE O/P EST SF 10 MIN: CPT | Performed by: SPECIALIST

## 2022-06-03 NOTE — NURSING NOTE
Antonella presents to [facility] for a follow up appointment with Dr. Ramirez .Patient notes no new breast concerns.  Patient had mammogram done recently, see report for details.  She is following with Dr. Rosa  for medical oncology and is taking endocrine therapy, tolerating it well.  RN assessment and EMRupdate.  Patient met with Dr. Ramirez .  See dictation for details of visit and follow up plan.  Support provided, invited calls.  RN time 12 mins.

## 2022-06-03 NOTE — PROGRESS NOTES
This is a 41 year old woman who comes in for  continued follow-up of her left breast cancer.  She is now 12 months  status post left  lumpectomy  with radiation.  She is feeling well.  She has no new complaints today.      Please see the chart review for PMH, Meds, allergies, FH and SH.    ROS:  Pertinent items are noted in HPI.      Physical Exam:  There were no vitals taken for this visit.  General appearance: alert, appears stated age and cooperative  Breasts: There are no palpable masses. There are minimal radiation changes. The scar(s) has(ve) healed up well.  Lymph nodes: Cervical, supraclavicular, and axillary nodes normal.  Neurologic: Grossly normal    Data Review: Reviewed her current mammograms. No evidence of disease.      Impression: Personal History of breast cancer. NOD.  Is overall doing very well.  Discussed with the patient that follow-up with myself can now be as needed.  She will be continuing with yearly mammograms and following up with her primary care physician and oncologist.    Plan: Follow up as needed.  Continue with yearly mammograms and continue to follow-up with oncology.

## 2022-06-03 NOTE — LETTER
6/3/2022         RE: Antonella Jamison  [address] [address]        Dear Colleague,    Thank you for referring your patient, Antonella Jamison, to the [facility]AST [facility]IN[facility] [employer]. Please see a copy of my visit note below.    This is a 41 year old woman who comes in for  continued follow-up of her left breast cancer.  She is now 12 months  status post left  lumpectomy  with radiation.  She is feeling well.  She has no new complaints today.      Please see the chart review for PMH, Meds, allergies, FH and SH.    ROS:  Pertinent items are noted in HPI.      Physical Exam:  There were no vitals taken for this visit.  General appearance: alert, appears stated age and cooperative  Breasts: There are no palpable masses. There are minimal radiation changes. The scar(s) has(ve) healed up well.  Lymph nodes: Cervical, supraclavicular, and axillary nodes normal.  Neurologic: Grossly normal    Data Review: Reviewed her current mammograms. No evidence of disease.      Impression: Personal History of breast cancer. NOD.  Is overall doing very well.  Discussed with the patient that follow-up with myself can now be as needed.  She will be continuing with yearly mammograms and following up with her primary care physician and oncologist.    Plan: Follow up as needed.  Continue with yearly mammograms and continue to follow-up with oncology.            Again, thank you for allowing me to participate in the care of your patient.        Sincerely,        Sigrid Ramirez MD

## 2022-06-22 ENCOUNTER — TELEPHONE (OUTPATIENT)
Dept: ONCOLOGY | Facility: CLINIC | Age: 42
End: 2022-06-22

## 2022-06-22 NOTE — TELEPHONE ENCOUNTER
Antonella sent a mychart message today asking if taking tamoxifen and being sexually active can increase recurrence of yeast infections. 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 She is also prone to yeast infections historically.    Uptodate does note side effect profile of 5-10% incidence of vaginitis with tamoxifen use.     We discussed this at length and gave her recommendations to try an OTC probiotic that can support female genitourinary health, also to urinate after intercourse.     She thanked me for the information and will call back prn. Sophie Aparicio RN    
denies pain/discomfort

## 2022-07-20 ENCOUNTER — APPOINTMENT (OUTPATIENT)
Dept: URBAN - METROPOLITAN AREA CLINIC 255 | Age: 42
Setting detail: DERMATOLOGY
End: 2022-07-24

## 2022-07-20 DIAGNOSIS — L72.8 OTHER FOLLICULAR CYSTS OF THE SKIN AND SUBCUTANEOUS TISSUE: ICD-10-CM

## 2022-07-20 PROCEDURE — OTHER DEFER: OTHER

## 2022-07-20 PROCEDURE — 99213 OFFICE O/P EST LOW 20 MIN: CPT

## 2022-07-20 PROCEDURE — OTHER MIPS QUALITY: OTHER

## 2022-07-20 PROCEDURE — OTHER COUNSELING: OTHER

## 2022-07-20 ASSESSMENT — LOCATION ZONE DERM: LOCATION ZONE: FACE

## 2022-07-20 ASSESSMENT — LOCATION SIMPLE DESCRIPTION DERM: LOCATION SIMPLE: RIGHT CHEEK

## 2022-07-20 ASSESSMENT — LOCATION DETAILED DESCRIPTION DERM: LOCATION DETAILED: RIGHT LATERAL BUCCAL CHEEK

## 2022-07-20 NOTE — PROCEDURE: COUNSELING
Patient Specific Counseling (Will Not Stick From Patient To Patient): Noted for the patient that this \"scar\" has not improved with laser or TAC injection because there is a residual cyst present.  This needs to be conservatively excised for complete removal.  She will schedule this procedure in the near future.
Detail Level: Detailed

## 2022-08-05 ENCOUNTER — MYC MEDICAL ADVICE (OUTPATIENT)
Dept: ONCOLOGY | Facility: CLINIC | Age: 42
End: 2022-08-05

## 2022-08-05 NOTE — TELEPHONE ENCOUNTER
[facility]: Cancer Care Short Note                                    Discussion with Patient:                                                    Called Antonella to follow up on her My Chart message regarding vaginal changes related to her use of Tamoxifen. She notes that her vaginal tissue is more dry since she has started Tamoxifen. She does not use a vaginal moisturizer at this time and uses astroglide at times when she has intercourse. She noted intermittent bacterial infections after intimacy. We discussed that Tamoxifen can contribute to vaginal dryness and change the pH of the vagina. Encouraged to use a vaginal moisturizer, replens, which is hormone free, and use every 3 days. Prior to intercourse, directed to use a good lubricant. She is currently using astroglide, a water based lubricant. Shared that many of the water based lubricants found in the drug stores have osmolality higher than than the natural range of the body and can leave membranes susceptible to infection. Shared that there are plant, silicone based lubricants available, as well as hybrid formulas. Number to [employer] was provided and shared that they have many knowledgeable staff there that can assist with helping her selecting a couple/few lubricants that is right for her. Writer will also send out a brochure from [employer] for her to review.      Assessment:                                                      Vaginal Dryness related to her use of Tamoxifen    Intervention/Education provided during outreach:                                                       Education given as documented above. Brochure mailed regarding [employer] for her review. No other questions at this time.    Signature:  Juanis Galdamez RN

## 2022-09-08 ENCOUNTER — ONCOLOGY VISIT (OUTPATIENT)
Dept: ONCOLOGY | Facility: CLINIC | Age: 42
End: 2022-09-08
Attending: RADIOLOGY
Payer: COMMERCIAL

## 2022-09-08 VITALS
HEIGHT: 69 IN | BODY MASS INDEX: 20.44 KG/M2 | HEART RATE: 83 BPM | OXYGEN SATURATION: 98 % | WEIGHT: 138 LBS | DIASTOLIC BLOOD PRESSURE: 77 MMHG | SYSTOLIC BLOOD PRESSURE: 116 MMHG

## 2022-09-08 DIAGNOSIS — Z79.810 LONG-TERM CURRENT USE OF TAMOXIFEN: ICD-10-CM

## 2022-09-08 DIAGNOSIS — C50.112 MALIGNANT NEOPLASM OF CENTRAL PORTION OF LEFT BREAST IN FEMALE, ESTROGEN RECEPTOR POSITIVE (H): Primary | ICD-10-CM

## 2022-09-08 DIAGNOSIS — Z17.0 MALIGNANT NEOPLASM OF CENTRAL PORTION OF LEFT BREAST IN FEMALE, ESTROGEN RECEPTOR POSITIVE (H): Primary | ICD-10-CM

## 2022-09-08 PROCEDURE — 99214 OFFICE O/P EST MOD 30 MIN: CPT | Performed by: INTERNAL MEDICINE

## 2022-09-08 PROCEDURE — G0463 HOSPITAL OUTPT CLINIC VISIT: HCPCS

## 2022-09-08 ASSESSMENT — PAIN SCALES - GENERAL: PAINLEVEL: NO PAIN (0)

## 2022-09-08 NOTE — PROGRESS NOTES
[facility] Hematology and Oncology Progress Note    Patient: Antonella Jamison  MRN: [id number]  Date of Service: Sep 8, 2022         Reason for Visit    Chief Complaint   Patient presents with     Oncology Clinic Visit     Malignant neoplasm of central portion of left breast in female, estrogen receptor positive       Assessment and Plan    Cancer Staging  Malignant neoplasm of central portion of left breast in female, estrogen receptor positive (H)  Staging form: Breast, AJCC 8th Edition  - Pathologic stage from 6/30/2021: Stage IA (pT1c, pN0(sn), cM0, G1, ER+, DC+, HER2-, Oncotype DX score: 11) - Signed by Sheri Rosa MD on 9/27/2021  - Clinical: No stage assigned - Unsigned      ECOG Performance    0 - Independent     Pain  Pain Score: No Pain (0)      #.  Stage IA (pT1c N0 M0) invasive ductal carcinoma of the upper outer quadrant of the left breast, ER positive, DC positive, HER-2 negative.  Oncotype DX 11/3% / <1%.  #.  Premenopausal.     Clinically stable.  Exam unremarkable.  The palpable glandular density in the upper outer quadrant of the wrist was previously evaluated by ultrasound in November 2021 and benign.  Subsequent diagnostic mammogram on 6/1/2022 showed postlumpectomy changes and no area concerning for malignancy.  Advised to continue to monitor at this point.     She tolerates tamoxifen reasonably well at this point.  She will continue tamoxifen.   Follow-up mammogram in 6/2023.   Bacterial vaginosis is not likely direct relationship with tamoxifen use.  Vaginal yeast infection is likely secondary to antibiotic use for bacterial vaginosis.   Follow-up clinical exam in 6 months.    #.  Moderate alcohol use   We discussed about the potential clinical impact on long-term alcohol use including negative impact on the liver, bone marrow in addition to breast cancer outcomes.  Abstinence is advised and offered assistance if needed.    Encounter Diagnoses:    Problem List Items Addressed This  Visit        Oncology Diagnoses    Malignant neoplasm of central portion of left breast in female, estrogen receptor positive (H) - Primary       Other    Long-term current use of tamoxifen             CC: Rosemary Maat MD   ______________________________________________________________________________  Diagnosis  5/2021-patient self palpated a lump in her left breast.   Invasive ductal carcinoma of the upper outer quadrant of the left breast.   15 mm, grade 1.   DCIS +   pT1c N0   ER positive (99%), FL positive (97%), HER-2/kelsey negative by IHC (1+).   Oncotype DX 11/3%/<1%    Treatment to date  6/9/2021-left breast lumpectomy and left sentinel lymph node biopsy.    6/14/2021-underwent reexcision due to close margins.  7/28/2021-8/24/2021-adjuvant radiation to the breast 5240 cGy in 20 treatments.  (Dr. Mayer)  8/2021-initiated tamoxifen.    History of Present Illness    Ms. Antonella Jamison presented herself today for follow-up.   She takes tamoxifen regularly.  No new side effects.  She admitted that she has been drinking alcohol a little bit more than before.    She reported she noted to have BV, vaginal yeast infection in the last few months that she was wondering whether it was related to tamoxifen.    LMP- 8/29/2022, regular.    Review of systems  Apart from describing in HPI, the remainder of comprehensive ROS was negative.    Past History    Past Medical History:   Diagnosis Date     Cancer (H)      Infectious viral hepatitis      Personal history of malaria        Past Surgical History:   Procedure Laterality Date     OTHER SURGICAL HISTORY      Hairy nevis     FL MASTECTOMY, PARTIAL Left 6/9/2021    Procedure: Left Lumpectomy (With MSC u/s): [place] Lymph Node Biopsy;  Surgeon: Sigrid Ramirez MD;  Location: [facility];  Service: General     FL MASTECTOMY, PARTIAL Left 6/14/2021    Procedure: Evacuation Hematoma, Re-excision Lumpectomy;  Surgeon: Sigrid Ramirez MD;  Location: [facility];   "Service: General     US SENTINEL NODE INJECTION  6/9/2021     VAGINAL DELIVERY       WISDOM TOOTH EXTRACTION       ZZC NONSPECIFIC PROCEDURE      nevus removal         Physical Exam    /77   Pulse 83   Ht 1.753 m (5' 9\")   Wt 62.6 kg (138 lb)   SpO2 98%   BMI 20.38 kg/m      General: alert, awake, not in acute distress  HEENT: Head: Normal, normocephalic, atraumatic.  Eye: Normal external eye, conjunctiva, lids cornea, GABINO.  Pharynx: Normal buccal mucosa. Normal pharynx.  Neck / Thyroid: Supple, no masses, nodes, nodules or enlargement.  Lymphatics: No abnormally enlarged lymph nodes.  Chest: Normal chest wall and respirations. Clear to auscultation.  Breasts: Persistent nodule in the upper outer quadrant of the right breast.  Otherwise unremarkable exam.  Heart: S1 S2 RRR.   Abdomen: abdomen is soft without significant tenderness, masses, organomegaly or guarding  Extremities: normal strength, tone, and muscle mass  Skin: normal. no rash or abnormalities  CNS: non focal.    Lab Results    No results found for this or any previous visit (from the past 168 hour(s)).    Imaging    No results found.     30 minutes spent on the date of the encounter doing chart review, history and exam, documentation and further activities as noted above.    Signed by: Sheri Rosa MD  "

## 2022-09-08 NOTE — PROGRESS NOTES
"Oncology Rooming Note    September 8, 2022 10:28 AM   Antonella Jamison is a 41 year old female who presents for:    Chief Complaint   Patient presents with     Oncology Clinic Visit     Malignant neoplasm of central portion of left breast in female, estrogen receptor positive     Initial Vitals: /77   Pulse 83   Ht 1.753 m (5' 9\")   Wt 62.6 kg (138 lb)   SpO2 98%   BMI 20.38 kg/m   Estimated body mass index is 20.38 kg/m  as calculated from the following:    Height as of this encounter: 1.753 m (5' 9\").    Weight as of this encounter: 62.6 kg (138 lb). Body surface area is 1.75 meters squared.  No Pain (0) Comment: Data Unavailable   No LMP recorded.  Allergies reviewed: Yes  Medications reviewed: Yes    Medications: Medication refills not needed today.  Pharmacy name entered into [facility]: [id number] [place], [place] - [address] AT [facility]    Clinical concerns: 6 month follow up    Rosalia Medeiros            "

## 2022-09-08 NOTE — LETTER
"    9/8/2022         RE: Antonella Jamison  [address] [address]        Dear Colleague,    Thank you for referring your patient, Antonella Jamison, to the [facility]. Please see a copy of my visit note below.    Oncology Rooming Note    September 8, 2022 10:28 AM   Antonella Jamison is a 41 year old female who presents for:    Chief Complaint   Patient presents with     Oncology Clinic Visit     Malignant neoplasm of central portion of left breast in female, estrogen receptor positive     Initial Vitals: /77   Pulse 83   Ht 1.753 m (5' 9\")   Wt 62.6 kg (138 lb)   SpO2 98%   BMI 20.38 kg/m   Estimated body mass index is 20.38 kg/m  as calculated from the following:    Height as of this encounter: 1.753 m (5' 9\").    Weight as of this encounter: 62.6 kg (138 lb). Body surface area is 1.75 meters squared.  No Pain (0) Comment: Data Unavailable   No LMP recorded.  Allergies reviewed: Yes  Medications reviewed: Yes    Medications: Medication refills not needed today.  Pharmacy name entered into [facility]: [facility] #[id number] [address]  AT [facility]    Clinical concerns: 6 month follow up    Rosalia Medeiros              [facility] Hematology and Oncology Progress Note    Patient: Antonella Jamison  MRN: [id number]  Date of Service: Sep 8, 2022         Reason for Visit    Chief Complaint   Patient presents with     Oncology Clinic Visit     Malignant neoplasm of central portion of left breast in female, estrogen receptor positive       Assessment and Plan    Cancer Staging  Malignant neoplasm of central portion of left breast in female, estrogen receptor positive (H)  Staging form: Breast, AJCC 8th Edition  - Pathologic stage from 6/30/2021: Stage IA (pT1c, pN0(sn), cM0, G1, ER+, NC+, HER2-, Oncotype DX score: 11) - Signed by Sheri Rosa MD on 9/27/2021  - Clinical: No stage assigned - Unsigned      ECOG Performance    0 " - Independent     Pain  Pain Score: No Pain (0)      #.  Stage IA (pT1c N0 M0) invasive ductal carcinoma of the upper outer quadrant of the left breast, ER positive, [place] positive, HER-2 negative.  Oncotype DX 11/3% / <1%.  #.  Premenopausal.     Clinically stable.  Exam unremarkable.  The palpable glandular density in the upper outer quadrant of the wrist was previously evaluated by ultrasound in November 2021 and benign.  Subsequent diagnostic mammogram on 6/1/2022 showed postlumpectomy changes and no area concerning for malignancy.  Advised to continue to monitor at this point.     She tolerates tamoxifen reasonably well at this point.  She will continue tamoxifen.   Follow-up mammogram in 6/2023.   Bacterial vaginosis is not likely direct relationship with tamoxifen use.  Vaginal yeast infection is likely secondary to antibiotic use for bacterial vaginosis.   Follow-up clinical exam in 6 months.    #.  Moderate alcohol use   We discussed about the potential clinical impact on long-term alcohol use including negative impact on the liver, bone marrow in addition to breast cancer outcomes.  Abstinence is advised and offered assistance if needed.    Encounter Diagnoses:    Problem List Items Addressed This Visit        Oncology Diagnoses    Malignant neoplasm of central portion of left breast in female, estrogen receptor positive (H) - Primary       Other    Long-term current use of tamoxifen             CC: Rosemary Mata MD   ______________________________________________________________________________  Diagnosis  5/2021-patient self palpated a lump in her left breast.   Invasive ductal carcinoma of the upper outer quadrant of the left breast.   15 mm, grade 1.   DCIS +   pT1c N0   ER positive (99%), AZ positive (97%), HER-2/kelsey negative by IHC (1+).   Oncotype DX 11/3%/<1%    Treatment to date  6/9/2021-left breast lumpectomy and left sentinel lymph node biopsy.    6/14/2021-underwent reexcision due to close  "margins.  7/28/2021-8/24/2021-adjuvant radiation to the breast 5240 cGy in 20 treatments.  (Dr. Mayer)  8/2021-initiated tamoxifen.    History of Present Illness    Ms. Antonella Jamison presented herself today for follow-up.   She takes tamoxifen regularly.  No new side effects.  She admitted that she has been drinking alcohol a little bit more than before.    She reported she noted to have BV, vaginal yeast infection in the last few months that she was wondering whether it was related to tamoxifen.    LMP- 8/29/2022, regular.    Review of systems  Apart from describing in HPI, the remainder of comprehensive ROS was negative.    Past History    Past Medical History:   Diagnosis Date     Cancer (H)      Infectious viral hepatitis      Personal history of malaria        Past Surgical History:   Procedure Laterality Date     OTHER SURGICAL HISTORY      Hairy nevis     RI MASTECTOMY, PARTIAL Left 6/9/2021    Procedure: Left Lumpectomy (With MSC u/s): [place] Lymph Node Biopsy;  Surgeon: Sigrid Ramirez MD;  Location: [facility];  Service: General     RI MASTECTOMY, PARTIAL Left 6/14/2021    Procedure: Evacuation Hematoma, Re-excision Lumpectomy;  Surgeon: Sigrid Ramirez MD;  Location: [facility];  Service: General      SENTINEL NODE INJECTION  6/9/2021     VAGINAL DELIVERY       WISDOM TOOTH EXTRACTION       ZZC NONSPECIFIC PROCEDURE      nevus removal         Physical Exam    /77   Pulse 83   Ht 1.753 m (5' 9\")   Wt 62.6 kg (138 lb)   SpO2 98%   BMI 20.38 kg/m      General: alert, awake, not in acute distress  HEENT: Head: Normal, normocephalic, atraumatic.  Eye: Normal external eye, conjunctiva, lids cornea, GABINO.  Pharynx: Normal buccal mucosa. Normal pharynx.  Neck / Thyroid: Supple, no masses, nodes, nodules or enlargement.  Lymphatics: No abnormally enlarged lymph nodes.  Chest: Normal chest wall and respirations. Clear to auscultation.  Breasts: Persistent nodule in the upper outer " quadrant of the right breast.  Otherwise unremarkable exam.  Heart: S1 S2 RRR.   Abdomen: abdomen is soft without significant tenderness, masses, organomegaly or guarding  Extremities: normal strength, tone, and muscle mass  Skin: normal. no rash or abnormalities  CNS: non focal.    Lab Results    No results found for this or any previous visit (from the past 168 hour(s)).    Imaging    No results found.     30 minutes spent on the date of the encounter doing chart review, history and exam, documentation and further activities as noted above.    Signed by: Sheri Rosa MD      Again, thank you for allowing me to participate in the care of your patient.        Sincerely,        Sheri Rosa MD

## 2022-09-09 ENCOUNTER — APPOINTMENT (OUTPATIENT)
Dept: URBAN - METROPOLITAN AREA CLINIC 260 | Age: 42
Setting detail: DERMATOLOGY
End: 2022-09-17

## 2022-09-09 DIAGNOSIS — L72.8 OTHER FOLLICULAR CYSTS OF THE SKIN AND SUBCUTANEOUS TISSUE: ICD-10-CM

## 2022-09-09 PROCEDURE — OTHER EXCISION: OTHER

## 2022-09-09 PROCEDURE — 12051 INTMD RPR FACE/MM 2.5 CM/<: CPT

## 2022-09-09 PROCEDURE — OTHER MIPS QUALITY: OTHER

## 2022-09-09 PROCEDURE — 11441 EXC FACE-MM B9+MARG 0.6-1 CM: CPT

## 2022-09-09 ASSESSMENT — LOCATION SIMPLE DESCRIPTION DERM: LOCATION SIMPLE: RIGHT CHEEK

## 2022-09-09 ASSESSMENT — LOCATION DETAILED DESCRIPTION DERM: LOCATION DETAILED: RIGHT CENTRAL MANDIBULAR CHEEK

## 2022-09-09 ASSESSMENT — LOCATION ZONE DERM: LOCATION ZONE: FACE

## 2022-09-09 NOTE — PROCEDURE: EXCISION
Island Pedicle Flap Text: The defect edges were debeveled with a #15 scalpel blade.  Given the location of the defect, shape of the defect and the proximity to free margins an island pedicle advancement flap was deemed most appropriate.  Using a sterile surgical marker, an appropriate advancement flap was drawn incorporating the defect, outlining the appropriate donor tissue and placing the expected incisions within the relaxed skin tension lines where possible.    The area thus outlined was incised deep to adipose tissue with a #15 scalpel blade.  The skin margins were undermined to an appropriate distance in all directions around the primary defect and laterally outward around the island pedicle utilizing iris scissors.  There was minimal undermining beneath the pedicle flap.
Perilesional Excision Additional Text (Leave Blank If You Do Not Want): The margin was drawn around the clinically apparent lesion. Incisions were then made along these lines to the appropriate tissue plane and the lesion was extirpated.
Bilateral Helical Rim Advancement Flap Text: The defect edges were debeveled with a #15 blade scalpel.  Given the location of the defect and the proximity to free margins (helical rim) a bilateral helical rim advancement flap was deemed most appropriate.  Using a sterile surgical marker, the appropriate advancement flaps were drawn incorporating the defect and placing the expected incisions between the helical rim and antihelix where possible.  The area thus outlined was incised through and through with a #15 scalpel blade.  With a skin hook and iris scissors, the flaps were gently and sharply undermined and freed up.
Retention Suture Bite Size: 3 mm
Render Path Notes In Note?: no
M-Plasty Intermediate Repair Preamble Text (Leave Blank If You Do Not Want): Undermining was performed with blunt dissection.
Show Accession Variable: Yes
Excision Depth: adipose tissue
Dermal Autograft Text: The defect edges were debeveled with a #15 scalpel blade.  Given the location of the defect, shape of the defect and the proximity to free margins a dermal autograft was deemed most appropriate.  Using a sterile surgical marker, the primary defect shape was transferred to the donor site. The area thus outlined was incised deep to adipose tissue with a #15 scalpel blade.  The harvested graft was then trimmed of adipose and epidermal tissue until only dermis was left.  The skin graft was then placed in the primary defect and oriented appropriately.
Nasalis-Muscle-Based Myocutaneous Island Pedicle Flap Text: Using a #15 blade, an incision was made around the donor flap to the level of the nasalis muscle. Wide lateral undermining was then performed in both the subcutaneous plane above the nasalis muscle, and in a submuscular plane just above periosteum. This allowed the formation of a free nasalis muscle axial pedicle (based on the angular artery) which was still attached to the actual cutaneous flap, increasing its mobility and vascular viability. Hemostasis was obtained with pinpoint electrocoagulation. The flap was mobilized into position and the pivotal anchor points positioned and stabilized with buried interrupted sutures. Subcutaneous and dermal tissues were closed in a multilayered fashion with sutures. Tissue redundancies were excised, and the epidermal edges were apposed without significant tension and sutured with sutures.
Undermining Type: Entire Wound
Lip Wedge Excision Repair Text: Given the location of the defect and the proximity to free margins a full thickness wedge repair was deemed most appropriate.  Using a sterile surgical marker, the appropriate repair was drawn incorporating the defect and placing the expected incisions perpendicular to the vermilion border.  The vermilion border was also meticulously outlined to ensure appropriate reapproximation during the repair.  The area thus outlined was incised through and through with a #15 scalpel blade.  The muscularis and dermis were reaproximated with deep sutures following hemostasis. Care was taken to realign the vermilion border before proceeding with the superficial closure.  Once the vermilion was realigned the superfical and mucosal closure was finished.
Consent was obtained from the patient. The risks and benefits to therapy were discussed in detail. Specifically, the risks of infection, scarring, bleeding, prolonged wound healing, incomplete removal, allergy to anesthesia, nerve injury and recurrence were addressed. Prior to the procedure, the treatment site was clearly identified and confirmed by the patient. All components of Universal Protocol/PAUSE Rule completed.
Secondary Defect Length (In Cm): 0
Spiral Flap Text: The defect edges were debeveled with a #15 scalpel blade.  Given the location of the defect, shape of the defect and the proximity to free margins a spiral flap was deemed most appropriate.  Using a sterile surgical marker, an appropriate rotation flap was drawn incorporating the defect and placing the expected incisions within the relaxed skin tension lines where possible. The area thus outlined was incised deep to adipose tissue with a #15 scalpel blade.  The skin margins were undermined to an appropriate distance in all directions utilizing iris scissors.
Complex Repair And Split-Thickness Skin Graft Text: The defect edges were debeveled with a #15 scalpel blade.  The primary defect was closed partially with a complex linear closure.  Given the location of the defect, shape of the defect and the proximity to free margins a split thickness skin graft was deemed most appropriate to repair the remaining defect.  The graft was trimmed to fit the size of the remaining defect.  The graft was then placed in the primary defect, oriented appropriately, and sutured into place.
Hemigard Postcare Instructions: The HEMIGARD strips are to remain completely dry for at least 5-7 days.
Cheek Interpolation Flap Text: A decision was made to reconstruct the defect utilizing an interpolation axial flap and a staged reconstruction.  A telfa template was made of the defect.  This telfa template was then used to outline the Cheek Interpolation flap.  The donor area for the pedicle flap was then injected with anesthesia.  The flap was excised through the skin and subcutaneous tissue down to the layer of the underlying musculature.  The interpolation flap was carefully excised within this deep plane to maintain its blood supply.  The edges of the donor site were undermined.   The donor site was closed in a primary fashion.  The pedicle was then rotated into position and sutured.  Once the tube was sutured into place, adequate blood supply was confirmed with blanching and refill.  The pedicle was then wrapped with xeroform gauze and dressed appropriately with a telfa and gauze bandage to ensure continued blood supply and protect the attached pedicle.
Double O-Z Flap Text: The defect edges were debeveled with a #15 scalpel blade.  Given the location of the defect, shape of the defect and the proximity to free margins a Double O-Z flap was deemed most appropriate.  Using a sterile surgical marker, an appropriate transposition flap was drawn incorporating the defect and placing the expected incisions within the relaxed skin tension lines where possible. The area thus outlined was incised deep to adipose tissue with a #15 scalpel blade.  The skin margins were undermined to an appropriate distance in all directions utilizing iris scissors.
Complex Repair And M Plasty Text: The defect edges were debeveled with a #15 scalpel blade.  The primary defect was closed partially with a complex linear closure.  Given the location of the remaining defect, shape of the defect and the proximity to free margins an M plasty was deemed most appropriate for complete closure of the defect.  Using a sterile surgical marker, an appropriate advancement flap was drawn incorporating the defect and placing the expected incisions within the relaxed skin tension lines where possible.    The area thus outlined was incised deep to adipose tissue with a #15 scalpel blade.  The skin margins were undermined to an appropriate distance in all directions utilizing iris scissors.
O-Z Plasty Text: The defect edges were debeveled with a #15 scalpel blade.  Given the location of the defect, shape of the defect and the proximity to free margins an O-Z plasty (double transposition flap) was deemed most appropriate.  Using a sterile surgical marker, the appropriate transposition flaps were drawn incorporating the defect and placing the expected incisions within the relaxed skin tension lines where possible.    The area thus outlined was incised deep to adipose tissue with a #15 scalpel blade.  The skin margins were undermined to an appropriate distance in all directions utilizing iris scissors.  Hemostasis was achieved with electrocautery.  The flaps were then transposed into place, one clockwise and the other counterclockwise, and anchored with interrupted buried subcutaneous sutures.
Number Of Hemigard Strips Per Side: 1
Burow's Advancement Flap Text: The defect edges were debeveled with a #15 scalpel blade.  Given the location of the defect and the proximity to free margins a Burow's advancement flap was deemed most appropriate.  Using a sterile surgical marker, the appropriate advancement flap was drawn incorporating the defect and placing the expected incisions within the relaxed skin tension lines where possible.    The area thus outlined was incised deep to adipose tissue with a #15 scalpel blade.  The skin margins were undermined to an appropriate distance in all directions utilizing iris scissors.
Estimated Blood Loss (Cc): 2 cc
Complex Repair And O-L Flap Text: The defect edges were debeveled with a #15 scalpel blade.  The primary defect was closed partially with a complex linear closure.  Given the location of the remaining defect, shape of the defect and the proximity to free margins an O-L flap was deemed most appropriate for complete closure of the defect.  Using a sterile surgical marker, an appropriate flap was drawn incorporating the defect and placing the expected incisions within the relaxed skin tension lines where possible.    The area thus outlined was incised deep to adipose tissue with a #15 scalpel blade.  The skin margins were undermined to an appropriate distance in all directions utilizing iris scissors.
Complex Repair And V-Y Plasty Text: The defect edges were debeveled with a #15 scalpel blade.  The primary defect was closed partially with a complex linear closure.  Given the location of the remaining defect, shape of the defect and the proximity to free margins a V-Y plasty was deemed most appropriate for complete closure of the defect.  Using a sterile surgical marker, an appropriate advancement flap was drawn incorporating the defect and placing the expected incisions within the relaxed skin tension lines where possible.    The area thus outlined was incised deep to adipose tissue with a #15 scalpel blade.  The skin margins were undermined to an appropriate distance in all directions utilizing iris scissors.
O-T Plasty Text: The defect edges were debeveled with a #15 scalpel blade.  Given the location of the defect, shape of the defect and the proximity to free margins an O-T plasty was deemed most appropriate.  Using a sterile surgical marker, an appropriate O-T plasty was drawn incorporating the defect and placing the expected incisions within the relaxed skin tension lines where possible.    The area thus outlined was incised deep to adipose tissue with a #15 scalpel blade.  The skin margins were undermined to an appropriate distance in all directions utilizing iris scissors.
Advancement Flap (Double) Text: The defect edges were debeveled with a #15 scalpel blade.  Given the location of the defect and the proximity to free margins a double advancement flap was deemed most appropriate.  Using a sterile surgical marker, the appropriate advancement flaps were drawn incorporating the defect and placing the expected incisions within the relaxed skin tension lines where possible.    The area thus outlined was incised deep to adipose tissue with a #15 scalpel blade.  The skin margins were undermined to an appropriate distance in all directions utilizing iris scissors.
Complex Repair And O-T Advancement Flap Text: The defect edges were debeveled with a #15 scalpel blade.  The primary defect was closed partially with a complex linear closure.  Given the location of the remaining defect, shape of the defect and the proximity to free margins an O-T advancement flap was deemed most appropriate for complete closure of the defect.  Using a sterile surgical marker, an appropriate advancement flap was drawn incorporating the defect and placing the expected incisions within the relaxed skin tension lines where possible.    The area thus outlined was incised deep to adipose tissue with a #15 scalpel blade.  The skin margins were undermined to an appropriate distance in all directions utilizing iris scissors.
Dorsal Nasal Flap Text: The defect edges were debeveled with a #15 scalpel blade.  Given the location of the defect and the proximity to free margins a dorsal nasal flap was deemed most appropriate.  Using a sterile surgical marker, an appropriate dorsal nasal flap was drawn around the defect.    The area thus outlined was incised deep to adipose tissue with a #15 scalpel blade.  The skin margins were undermined to an appropriate distance in all directions utilizing iris scissors.
Excisional Biopsy Additional Text (Leave Blank If You Do Not Want): The margin was drawn around the clinically apparent lesion. An elliptical shape was then drawn on the skin incorporating the lesion and margins.  Incisions were then made along these lines to the appropriate tissue plane and the lesion was extirpated.
Helical Rim Advancement Flap Text: The defect edges were debeveled with a #15 blade scalpel.  Given the location of the defect and the proximity to free margins (helical rim) a double helical rim advancement flap was deemed most appropriate.  Using a sterile surgical marker, the appropriate advancement flaps were drawn incorporating the defect and placing the expected incisions between the helical rim and antihelix where possible.  The area thus outlined was incised through and through with a #15 scalpel blade.  With a skin hook and iris scissors, the flaps were gently and sharply undermined and freed up.
Intermediate / Complex Repair - Final Wound Length In Cm: 1.2
Retention Suture Text: Retention sutures were placed to support the closure and prevent dehiscence.
Home Suture Removal Text: Patient was provided a home suture removal kit and will remove their sutures at home.  If they have any questions or difficulties they will call the office.
Repair Type: Intermediate
Dressing: dry sterile dressing
Epidermal Autograft Text: The defect edges were debeveled with a #15 scalpel blade.  Given the location of the defect, shape of the defect and the proximity to free margins an epidermal autograft was deemed most appropriate.  Using a sterile surgical marker, the primary defect shape was transferred to the donor site. The epidermal graft was then harvested.  The skin graft was then placed in the primary defect and oriented appropriately.
Post-Care Instructions: I reviewed with the patient in detail post-care instructions. Patient is not to engage in any heavy lifting, exercise, or swimming for the next 14 days. Should the patient develop any fevers, chills, bleeding, severe pain patient will contact the office immediately.
Nasal Turnover Hinge Flap Text: The defect edges were debeveled with a #15 scalpel blade.  Given the size, depth, location of the defect and the defect being full thickness a nasal turnover hinge flap was deemed most appropriate.  Using a sterile surgical marker, an appropriate hinge flap was drawn incorporating the defect. The area thus outlined was incised with a #15 scalpel blade. The flap was designed to recreate the nasal mucosal lining and the alar rim. The skin margins were undermined to an appropriate distance in all directions utilizing iris scissors.
Lazy S Complex Repair Preamble Text (Leave Blank If You Do Not Want): Extensive wide undermining was performed.
Paramedian Forehead Flap Text: A decision was made to reconstruct the defect utilizing an interpolation axial flap and a staged reconstruction.  A telfa template was made of the defect.  This telfa template was then used to outline the paramedian forehead pedicle flap.  The donor area for the pedicle flap was then injected with anesthesia.  The flap was excised through the skin and subcutaneous tissue down to the layer of the underlying musculature.  The pedicle flap was carefully excised within this deep plane to maintain its blood supply.  The edges of the donor site were undermined.   The donor site was closed in a primary fashion.  The pedicle was then rotated into position and sutured.  Once the tube was sutured into place, adequate blood supply was confirmed with blanching and refill.  The pedicle was then wrapped with xeroform gauze and dressed appropriately with a telfa and gauze bandage to ensure continued blood supply and protect the attached pedicle.
Rotation Flap Text: The defect edges were debeveled with a #15 scalpel blade.  Given the location of the defect, shape of the defect and the proximity to free margins a rotation flap was deemed most appropriate.  Using a sterile surgical marker, an appropriate rotation flap was drawn incorporating the defect and placing the expected incisions within the relaxed skin tension lines where possible.    The area thus outlined was incised deep to adipose tissue with a #15 scalpel blade.  The skin margins were undermined to an appropriate distance in all directions utilizing iris scissors.
Primary Defect Width (In Cm): 0.6
Complex Repair And Burow's Graft Text: The defect edges were debeveled with a #15 scalpel blade.  The primary defect was closed partially with a complex linear closure.  Given the location of the defect, shape of the defect, the proximity to free margins and the presence of a standing cone deformity a Burow's graft was deemed most appropriate to repair the remaining defect.  The graft was trimmed to fit the size of the remaining defect.  The graft was then placed in the primary defect, oriented appropriately, and sutured into place.
Zygomaticofacial Flap Text: Given the location of the defect, shape of the defect and the proximity to free margins a zygomaticofacial flap was deemed most appropriate for repair.  Using a sterile surgical marker, the appropriate flap was drawn incorporating the defect and placing the expected incisions within the relaxed skin tension lines where possible. The area thus outlined was incised deep to adipose tissue with a #15 scalpel blade with preservation of a vascular pedicle.  The skin margins were undermined to an appropriate distance in all directions utilizing iris scissors.  The flap was then placed into the defect and anchored with interrupted buried subcutaneous sutures.
O-Z Flap Text: The defect edges were debeveled with a #15 scalpel blade.  Given the location of the defect, shape of the defect and the proximity to free margins an O-Z flap was deemed most appropriate.  Using a sterile surgical marker, an appropriate transposition flap was drawn incorporating the defect and placing the expected incisions within the relaxed skin tension lines where possible. The area thus outlined was incised deep to adipose tissue with a #15 scalpel blade.  The skin margins were undermined to an appropriate distance in all directions utilizing iris scissors.
Complex Repair And Ftsg Text: The defect edges were debeveled with a #15 scalpel blade.  The primary defect was closed partially with a complex linear closure.  Given the location of the defect, shape of the defect and the proximity to free margins a full thickness skin graft was deemed most appropriate to repair the remaining defect.  The graft was trimmed to fit the size of the remaining defect.  The graft was then placed in the primary defect, oriented appropriately, and sutured into place.
Z Plasty Text: The lesion was extirpated to the level of the fat with a #15 scalpel blade.  Given the location of the defect, shape of the defect and the proximity to free margins a Z-plasty was deemed most appropriate for repair.  Using a sterile surgical marker, the appropriate transposition arms of the Z-plasty were drawn incorporating the defect and placing the expected incisions within the relaxed skin tension lines where possible.    The area thus outlined was incised deep to adipose tissue with a #15 scalpel blade.  The skin margins were undermined to an appropriate distance in all directions utilizing iris scissors.  The opposing transposition arms were then transposed into place in opposite direction and anchored with interrupted buried subcutaneous sutures.
Hemigard Intro: Due to skin fragility and wound tension, it was decided to use HEMIGARD adhesive retention suture devices to permit a linear closure. The skin was cleaned and dried for a 6cm distance away from the wound. Excessive hair, if present, was removed to allow for adhesion.
O-L Flap Text: The defect edges were debeveled with a #15 scalpel blade.  Given the location of the defect, shape of the defect and the proximity to free margins an O-L flap was deemed most appropriate.  Using a sterile surgical marker, an appropriate advancement flap was drawn incorporating the defect and placing the expected incisions within the relaxed skin tension lines where possible.    The area thus outlined was incised deep to adipose tissue with a #15 scalpel blade.  The skin margins were undermined to an appropriate distance in all directions utilizing iris scissors.
Where Do You Want The Question To Include Opioid Counseling Located?: Case Summary Tab
Complex Repair And Transposition Flap Text: The defect edges were debeveled with a #15 scalpel blade.  The primary defect was closed partially with a complex linear closure.  Given the location of the remaining defect, shape of the defect and the proximity to free margins a transposition flap was deemed most appropriate for complete closure of the defect.  Using a sterile surgical marker, an appropriate advancement flap was drawn incorporating the defect and placing the expected incisions within the relaxed skin tension lines where possible.    The area thus outlined was incised deep to adipose tissue with a #15 scalpel blade.  The skin margins were undermined to an appropriate distance in all directions utilizing iris scissors.
Keystone Flap Text: The defect edges were debeveled with a #15 scalpel blade.  Given the location of the defect, shape of the defect a keystone flap was deemed most appropriate.  Using a sterile surgical marker, an appropriate keystone flap was drawn incorporating the defect, outlining the appropriate donor tissue and placing the expected incisions within the relaxed skin tension lines where possible. The area thus outlined was incised deep to adipose tissue with a #15 scalpel blade.  The skin margins were undermined to an appropriate distance in all directions around the primary defect and laterally outward around the flap utilizing iris scissors.
Advancement Flap (Single) Text: The defect edges were debeveled with a #15 scalpel blade.  Given the location of the defect and the proximity to free margins a single advancement flap was deemed most appropriate.  Using a sterile surgical marker, an appropriate advancement flap was drawn incorporating the defect and placing the expected incisions within the relaxed skin tension lines where possible.    The area thus outlined was incised deep to adipose tissue with a #15 scalpel blade.  The skin margins were undermined to an appropriate distance in all directions utilizing iris scissors.
Nostril Rim Text: The closure involved the nostril rim.
Complex Repair And A-T Advancement Flap Text: The defect edges were debeveled with a #15 scalpel blade.  The primary defect was closed partially with a complex linear closure.  Given the location of the remaining defect, shape of the defect and the proximity to free margins an A-T advancement flap was deemed most appropriate for complete closure of the defect.  Using a sterile surgical marker, an appropriate advancement flap was drawn incorporating the defect and placing the expected incisions within the relaxed skin tension lines where possible.    The area thus outlined was incised deep to adipose tissue with a #15 scalpel blade.  The skin margins were undermined to an appropriate distance in all directions utilizing iris scissors.
Trilobed Flap Text: The defect edges were debeveled with a #15 scalpel blade.  Given the location of the defect and the proximity to free margins a trilobed flap was deemed most appropriate.  Using a sterile surgical marker, an appropriate trilobed flap drawn around the defect.    The area thus outlined was incised deep to adipose tissue with a #15 scalpel blade.  The skin margins were undermined to an appropriate distance in all directions utilizing iris scissors.
Length To Time In Minutes Device Was In Place: 10
Slit Excision Additional Text (Leave Blank If You Do Not Want): A linear line was drawn on the skin overlying the lesion. An incision was made slowly until the lesion was visualized.  Once visualized, the lesion was removed with blunt dissection.
Bi-Rhombic Flap Text: The defect edges were debeveled with a #15 scalpel blade.  Given the location of the defect and the proximity to free margins a bi-rhombic flap was deemed most appropriate.  Using a sterile surgical marker, an appropriate rhombic flap was drawn incorporating the defect. The area thus outlined was incised deep to adipose tissue with a #15 scalpel blade.  The skin margins were undermined to an appropriate distance in all directions utilizing iris scissors.
Anesthesia Type: 1% lidocaine with epinephrine
Medical Necessity Clause: This procedure was medically necessary because the lesion that was treated was:
Epidermal Closure: running
Epidermal Sutures: 6-0 Polypropylene
Composite Graft Text: The defect edges were debeveled with a #15 scalpel blade.  Given the location of the defect, shape of the defect, the proximity to free margins and the fact the defect was full thickness a composite graft was deemed most appropriate.  The defect was outline and then transferred to the donor site.  A full thickness graft was then excised from the donor site. The graft was then placed in the primary defect, oriented appropriately and then sutured into place.  The secondary defect was then repaired using a primary closure.
Mustarde Flap Text: The defect edges were debeveled with a #15 scalpel blade.  Given the size, depth and location of the defect and the proximity to free margins a Mustarde flap was deemed most appropriate.  Using a sterile surgical marker, an appropriate flap was drawn incorporating the defect. The area thus outlined was incised with a #15 scalpel blade.  The skin margins were undermined to an appropriate distance in all directions utilizing iris scissors.
Posterior Auricular Interpolation Flap Text: A decision was made to reconstruct the defect utilizing an interpolation axial flap and a staged reconstruction.  A telfa template was made of the defect.  This telfa template was then used to outline the posterior auricular interpolation flap.  The donor area for the pedicle flap was then injected with anesthesia.  The flap was excised through the skin and subcutaneous tissue down to the layer of the underlying musculature.  The pedicle flap was carefully excised within this deep plane to maintain its blood supply.  The edges of the donor site were undermined.   The donor site was closed in a primary fashion.  The pedicle was then rotated into position and sutured.  Once the tube was sutured into place, adequate blood supply was confirmed with blanching and refill.  The pedicle was then wrapped with xeroform gauze and dressed appropriately with a telfa and gauze bandage to ensure continued blood supply and protect the attached pedicle.
Hatchet Flap Text: The defect edges were debeveled with a #15 scalpel blade.  Given the location of the defect, shape of the defect and the proximity to free margins a hatchet flap was deemed most appropriate.  Using a sterile surgical marker, an appropriate hatchet flap was drawn incorporating the defect and placing the expected incisions within the relaxed skin tension lines where possible.    The area thus outlined was incised deep to adipose tissue with a #15 scalpel blade.  The skin margins were undermined to an appropriate distance in all directions utilizing iris scissors.
Muscle Hinge Flap Text: The defect edges were debeveled with a #15 scalpel blade.  Given the size, depth and location of the defect and the proximity to free margins a muscle hinge flap was deemed most appropriate.  Using a sterile surgical marker, an appropriate hinge flap was drawn incorporating the defect. The area thus outlined was incised with a #15 scalpel blade.  The skin margins were undermined to an appropriate distance in all directions utilizing iris scissors.
Complex Repair And Xenograft Text: The defect edges were debeveled with a #15 scalpel blade.  The primary defect was closed partially with a complex linear closure.  Given the location of the defect, shape of the defect and the proximity to free margins a xenograft was deemed most appropriate to repair the remaining defect.  The graft was trimmed to fit the size of the remaining defect.  The graft was then placed in the primary defect, oriented appropriately, and sutured into place.
Scalpel Size: 15 blade
Mastoid Interpolation Flap Text: A decision was made to reconstruct the defect utilizing an interpolation axial flap and a staged reconstruction.  A telfa template was made of the defect.  This telfa template was then used to outline the mastoid interpolation flap.  The donor area for the pedicle flap was then injected with anesthesia.  The flap was excised through the skin and subcutaneous tissue down to the layer of the underlying musculature.  The pedicle flap was carefully excised within this deep plane to maintain its blood supply.  The edges of the donor site were undermined.   The donor site was closed in a primary fashion.  The pedicle was then rotated into position and sutured.  Once the tube was sutured into place, adequate blood supply was confirmed with blanching and refill.  The pedicle was then wrapped with xeroform gauze and dressed appropriately with a telfa and gauze bandage to ensure continued blood supply and protect the attached pedicle.
Mucosal Advancement Flap Text: Given the location of the defect, shape of the defect and the proximity to free margins a mucosal advancement flap was deemed most appropriate. Incisions were made with a 15 blade scalpel in the appropriate fashion along the cutaneous vermillion border and the mucosal lip. The remaining actinically damaged mucosal tissue was excised.  The mucosal advancement flap was then elevated to the gingival sulcus with care taken to preserve the neurovascular structures and advanced into the primary defect. Care was taken to ensure that precise realignment of the vermilion border was achieved.
Complex Repair And Dorsal Nasal Flap Text: The defect edges were debeveled with a #15 scalpel blade.  The primary defect was closed partially with a complex linear closure.  Given the location of the remaining defect, shape of the defect and the proximity to free margins a dorsal nasal flap was deemed most appropriate for complete closure of the defect.  Using a sterile surgical marker, an appropriate flap was drawn incorporating the defect and placing the expected incisions within the relaxed skin tension lines where possible.    The area thus outlined was incised deep to adipose tissue with a #15 scalpel blade.  The skin margins were undermined to an appropriate distance in all directions utilizing iris scissors.
W Plasty Text: The lesion was extirpated to the level of the fat with a #15 scalpel blade.  Given the location of the defect, shape of the defect and the proximity to free margins a W-plasty was deemed most appropriate for repair.  Using a sterile surgical marker, the appropriate transposition arms of the W-plasty were drawn incorporating the defect and placing the expected incisions within the relaxed skin tension lines where possible.    The area thus outlined was incised deep to adipose tissue with a #15 scalpel blade.  The skin margins were undermined to an appropriate distance in all directions utilizing iris scissors.  The opposing transposition arms were then transposed into place in opposite direction and anchored with interrupted buried subcutaneous sutures.
O-T Advancement Flap Text: The defect edges were debeveled with a #15 scalpel blade.  Given the location of the defect, shape of the defect and the proximity to free margins an O-T advancement flap was deemed most appropriate.  Using a sterile surgical marker, an appropriate advancement flap was drawn incorporating the defect and placing the expected incisions within the relaxed skin tension lines where possible.    The area thus outlined was incised deep to adipose tissue with a #15 scalpel blade.  The skin margins were undermined to an appropriate distance in all directions utilizing iris scissors.
Complex Repair And Rhombic Flap Text: The defect edges were debeveled with a #15 scalpel blade.  The primary defect was closed partially with a complex linear closure.  Given the location of the remaining defect, shape of the defect and the proximity to free margins a rhombic flap was deemed most appropriate for complete closure of the defect.  Using a sterile surgical marker, an appropriate advancement flap was drawn incorporating the defect and placing the expected incisions within the relaxed skin tension lines where possible.    The area thus outlined was incised deep to adipose tissue with a #15 scalpel blade.  The skin margins were undermined to an appropriate distance in all directions utilizing iris scissors.
Island Pedicle Flap-Requiring Vessel Identification Text: The defect edges were debeveled with a #15 scalpel blade.  Given the location of the defect, shape of the defect and the proximity to free margins an island pedicle advancement flap was deemed most appropriate.  Using a sterile surgical marker, an appropriate advancement flap was drawn, based on the axial vessel mentioned above, incorporating the defect, outlining the appropriate donor tissue and placing the expected incisions within the relaxed skin tension lines where possible.    The area thus outlined was incised deep to adipose tissue with a #15 scalpel blade.  The skin margins were undermined to an appropriate distance in all directions around the primary defect and laterally outward around the island pedicle utilizing iris scissors.  There was minimal undermining beneath the pedicle flap.
Adjacent Tissue Transfer Text: The defect edges were debeveled with a #15 scalpel blade.  Given the location of the defect and the proximity to free margins an adjacent tissue transfer was deemed most appropriate.  Using a sterile surgical marker, an appropriate flap was drawn incorporating the defect and placing the expected incisions within the relaxed skin tension lines where possible.    The area thus outlined was incised deep to adipose tissue with a #15 scalpel blade.  The skin margins were undermined to an appropriate distance in all directions utilizing iris scissors.
Complex Repair And Modified Advancement Flap Text: The defect edges were debeveled with a #15 scalpel blade.  The primary defect was closed partially with a complex linear closure.  Given the location of the remaining defect, shape of the defect and the proximity to free margins a modified advancement flap was deemed most appropriate for complete closure of the defect.  Using a sterile surgical marker, an appropriate advancement flap was drawn incorporating the defect and placing the expected incisions within the relaxed skin tension lines where possible.    The area thus outlined was incised deep to adipose tissue with a #15 scalpel blade.  The skin margins were undermined to an appropriate distance in all directions utilizing iris scissors.
Debridement Text: The wound edges were debrided prior to proceeding with the closure to facilitate wound healing.
Excision Method: Perilesional
Purse String (Intermediate) Text: Given the location of the defect and the characteristics of the surrounding skin a purse string intermediate closure was deemed most appropriate.  Undermining was performed circumferentially around the surgical defect.  A purse string suture was then placed and tightened.
Cartilage Graft Text: The defect edges were debeveled with a #15 scalpel blade.  Given the location of the defect, shape of the defect, the fact the defect involved a full thickness cartilage defect a cartilage graft was deemed most appropriate.  An appropriate donor site was identified, cleansed, and anesthetized. The cartilage graft was then harvested and transferred to the recipient site, oriented appropriately and then sutured into place.  The secondary defect was then repaired using a primary closure.
Xenograft Text: The defect edges were debeveled with a #15 scalpel blade.  Given the location of the defect, shape of the defect and the proximity to free margins a xenograft was deemed most appropriate.  The graft was then trimmed to fit the size of the defect.  The graft was then placed in the primary defect and oriented appropriately.
Rhombic Flap Text: The defect edges were debeveled with a #15 scalpel blade.  Given the location of the defect and the proximity to free margins a rhombic flap was deemed most appropriate.  Using a sterile surgical marker, an appropriate rhombic flap was drawn incorporating the defect.    The area thus outlined was incised deep to adipose tissue with a #15 scalpel blade.  The skin margins were undermined to an appropriate distance in all directions utilizing iris scissors.
Burow's Graft Text: The defect edges were debeveled with a #15 scalpel blade.  Given the location of the defect, shape of the defect, the proximity to free margins and the presence of a standing cone deformity a Burow's skin graft was deemed most appropriate. The standing cone was removed and this tissue was then trimmed to the shape of the primary defect. The adipose tissue was also removed until only dermis and epidermis were left.  The skin margins of the secondary defect were undermined to an appropriate distance in all directions utilizing iris scissors.  The secondary defect was closed with interrupted buried subcutaneous sutures.  The skin edges were then re-apposed with running  sutures.  The skin graft was then placed in the primary defect and oriented appropriately.
Transposition Flap Text: The defect edges were debeveled with a #15 scalpel blade.  Given the location of the defect and the proximity to free margins a transposition flap was deemed most appropriate.  Using a sterile surgical marker, an appropriate transposition flap was drawn incorporating the defect.    The area thus outlined was incised deep to adipose tissue with a #15 scalpel blade.  The skin margins were undermined to an appropriate distance in all directions utilizing iris scissors.
Complex Repair And Tissue Cultured Epidermal Autograft Text: The defect edges were debeveled with a #15 scalpel blade.  The primary defect was closed partially with a complex linear closure.  Given the location of the defect, shape of the defect and the proximity to free margins an tissue cultured epidermal autograft was deemed most appropriate to repair the remaining defect.  The graft was trimmed to fit the size of the remaining defect.  The graft was then placed in the primary defect, oriented appropriately, and sutured into place.
Melolabial Interpolation Flap Text: A decision was made to reconstruct the defect utilizing an interpolation axial flap and a staged reconstruction.  A telfa template was made of the defect.  This telfa template was then used to outline the melolabial interpolation flap.  The donor area for the pedicle flap was then injected with anesthesia.  The flap was excised through the skin and subcutaneous tissue down to the layer of the underlying musculature.  The pedicle flap was carefully excised within this deep plane to maintain its blood supply.  The edges of the donor site were undermined.   The donor site was closed in a primary fashion.  The pedicle was then rotated into position and sutured.  Once the tube was sutured into place, adequate blood supply was confirmed with blanching and refill.  The pedicle was then wrapped with xeroform gauze and dressed appropriately with a telfa and gauze bandage to ensure continued blood supply and protect the attached pedicle.
Modified Advancement Flap Text: The defect edges were debeveled with a #15 scalpel blade.  Given the location of the defect, shape of the defect and the proximity to free margins a modified advancement flap was deemed most appropriate.  Using a sterile surgical marker, an appropriate advancement flap was drawn incorporating the defect and placing the expected incisions within the relaxed skin tension lines where possible.    The area thus outlined was incised deep to adipose tissue with a #15 scalpel blade.  The skin margins were undermined to an appropriate distance in all directions utilizing iris scissors.
Complex Repair And Z Plasty Text: The defect edges were debeveled with a #15 scalpel blade.  The primary defect was closed partially with a complex linear closure.  Given the location of the remaining defect, shape of the defect and the proximity to free margins a Z plasty was deemed most appropriate for complete closure of the defect.  Using a sterile surgical marker, an appropriate advancement flap was drawn incorporating the defect and placing the expected incisions within the relaxed skin tension lines where possible.    The area thus outlined was incised deep to adipose tissue with a #15 scalpel blade.  The skin margins were undermined to an appropriate distance in all directions utilizing iris scissors.
Suturegard Body: The suture ends were repeatedly re-tightened and re-clamped to achieve the desired tissue expansion.
H Plasty Text: Given the location of the defect, shape of the defect and the proximity to free margins a H-plasty was deemed most appropriate for repair.  Using a sterile surgical marker, the appropriate advancement arms of the H-plasty were drawn incorporating the defect and placing the expected incisions within the relaxed skin tension lines where possible. The area thus outlined was incised deep to adipose tissue with a #15 scalpel blade. The skin margins were undermined to an appropriate distance in all directions utilizing iris scissors.  The opposing advancement arms were then advanced into place in opposite direction and anchored with interrupted buried subcutaneous sutures.
A-T Advancement Flap Text: The defect edges were debeveled with a #15 scalpel blade.  Given the location of the defect, shape of the defect and the proximity to free margins an A-T advancement flap was deemed most appropriate.  Using a sterile surgical marker, an appropriate advancement flap was drawn incorporating the defect and placing the expected incisions within the relaxed skin tension lines where possible.    The area thus outlined was incised deep to adipose tissue with a #15 scalpel blade.  The skin margins were undermined to an appropriate distance in all directions utilizing iris scissors.
Bilobed Transposition Flap Text: The defect edges were debeveled with a #15 scalpel blade.  Given the location of the defect and the proximity to free margins a bilobed transposition flap was deemed most appropriate.  Using a sterile surgical marker, an appropriate bilobe flap drawn around the defect.    The area thus outlined was incised deep to adipose tissue with a #15 scalpel blade.  The skin margins were undermined to an appropriate distance in all directions utilizing iris scissors.
Saucerization Excision Additional Text (Leave Blank If You Do Not Want): The margin was drawn around the clinically apparent lesion.  Incisions were then made along these lines, in a tangential fashion, to the appropriate tissue plane and the lesion was extirpated.
Rhomboid Transposition Flap Text: The defect edges were debeveled with a #15 scalpel blade.  Given the location of the defect and the proximity to free margins a rhomboid transposition flap was deemed most appropriate.  Using a sterile surgical marker, an appropriate rhomboid flap was drawn incorporating the defect.    The area thus outlined was incised deep to adipose tissue with a #15 scalpel blade.  The skin margins were undermined to an appropriate distance in all directions utilizing iris scissors.
Suture Removal: 7 days
Additional Anesthesia Volume In Cc: 6
Anesthesia Volume In Cc: 3
Size Of Margin In Cm: 0.1
Tissue Cultured Epidermal Autograft Text: The defect edges were debeveled with a #15 scalpel blade.  Given the location of the defect, shape of the defect and the proximity to free margins a tissue cultured epidermal autograft was deemed most appropriate.  The graft was then trimmed to fit the size of the defect.  The graft was then placed in the primary defect and oriented appropriately.
Melolabial Transposition Flap Text: The defect edges were debeveled with a #15 scalpel blade.  Given the location of the defect and the proximity to free margins a melolabial flap was deemed most appropriate.  Using a sterile surgical marker, an appropriate melolabial transposition flap was drawn incorporating the defect.    The area thus outlined was incised deep to adipose tissue with a #15 scalpel blade.  The skin margins were undermined to an appropriate distance in all directions utilizing iris scissors.
Split-Thickness Skin Graft Text: The defect edges were debeveled with a #15 scalpel blade.  Given the location of the defect, shape of the defect and the proximity to free margins a split thickness skin graft was deemed most appropriate.  Using a sterile surgical marker, the primary defect shape was transferred to the donor site. The split thickness graft was then harvested.  The skin graft was then placed in the primary defect and oriented appropriately.
Star Wedge Flap Text: The defect edges were debeveled with a #15 scalpel blade.  Given the location of the defect, shape of the defect and the proximity to free margins a star wedge flap was deemed most appropriate.  Using a sterile surgical marker, an appropriate rotation flap was drawn incorporating the defect and placing the expected incisions within the relaxed skin tension lines where possible. The area thus outlined was incised deep to adipose tissue with a #15 scalpel blade.  The skin margins were undermined to an appropriate distance in all directions utilizing iris scissors.
Hemostasis: Electrocautery
Saucerization Depth: dermis and superficial adipose tissue
Complex Repair And Dermal Autograft Text: The defect edges were debeveled with a #15 scalpel blade.  The primary defect was closed partially with a complex linear closure.  Given the location of the defect, shape of the defect and the proximity to free margins an dermal autograft was deemed most appropriate to repair the remaining defect.  The graft was trimmed to fit the size of the remaining defect.  The graft was then placed in the primary defect, oriented appropriately, and sutured into place.
Interpolation Flap Text: A decision was made to reconstruct the defect utilizing an interpolation axial flap and a staged reconstruction.  A telfa template was made of the defect.  This telfa template was then used to outline the interpolation flap.  The donor area for the pedicle flap was then injected with anesthesia.  The flap was excised through the skin and subcutaneous tissue down to the layer of the underlying musculature.  The interpolation flap was carefully excised within this deep plane to maintain its blood supply.  The edges of the donor site were undermined.   The donor site was closed in a primary fashion.  The pedicle was then rotated into position and sutured.  Once the tube was sutured into place, adequate blood supply was confirmed with blanching and refill.  The pedicle was then wrapped with xeroform gauze and dressed appropriately with a telfa and gauze bandage to ensure continued blood supply and protect the attached pedicle.
Mercedes Flap Text: The defect edges were debeveled with a #15 scalpel blade.  Given the location of the defect, shape of the defect and the proximity to free margins a Mercedes flap was deemed most appropriate.  Using a sterile surgical marker, an appropriate advancement flap was drawn incorporating the defect and placing the expected incisions within the relaxed skin tension lines where possible. The area thus outlined was incised deep to adipose tissue with a #15 scalpel blade.  The skin margins were undermined to an appropriate distance in all directions utilizing iris scissors.
Complex Repair And Rotation Flap Text: The defect edges were debeveled with a #15 scalpel blade.  The primary defect was closed partially with a complex linear closure.  Given the location of the remaining defect, shape of the defect and the proximity to free margins a rotation flap was deemed most appropriate for complete closure of the defect.  Using a sterile surgical marker, an appropriate advancement flap was drawn incorporating the defect and placing the expected incisions within the relaxed skin tension lines where possible.    The area thus outlined was incised deep to adipose tissue with a #15 scalpel blade.  The skin margins were undermined to an appropriate distance in all directions utilizing iris scissors.
Double Island Pedicle Flap Text: The defect edges were debeveled with a #15 scalpel blade.  Given the location of the defect, shape of the defect and the proximity to free margins a double island pedicle advancement flap was deemed most appropriate.  Using a sterile surgical marker, an appropriate advancement flap was drawn incorporating the defect, outlining the appropriate donor tissue and placing the expected incisions within the relaxed skin tension lines where possible.    The area thus outlined was incised deep to adipose tissue with a #15 scalpel blade.  The skin margins were undermined to an appropriate distance in all directions around the primary defect and laterally outward around the island pedicle utilizing iris scissors.  There was minimal undermining beneath the pedicle flap.
No Repair - Repaired With Adjacent Surgical Defect Text (Leave Blank If You Do Not Want): After the excision the defect was repaired concurrently with another surgical defect which was in close approximation.
Vermilion Border Text: The closure involved the vermilion border.
Complex Repair And Double Advancement Flap Text: The defect edges were debeveled with a #15 scalpel blade.  The primary defect was closed partially with a complex linear closure.  Given the location of the remaining defect, shape of the defect and the proximity to free margins a double advancement flap was deemed most appropriate for complete closure of the defect.  Using a sterile surgical marker, an appropriate advancement flap was drawn incorporating the defect and placing the expected incisions within the relaxed skin tension lines where possible.    The area thus outlined was incised deep to adipose tissue with a #15 scalpel blade.  The skin margins were undermined to an appropriate distance in all directions utilizing iris scissors.
Medical Necessity Information: It is in your best interest to select a reason for this procedure from the list below. All of these items fulfill various CMS LCD requirements except lesion extends to a margin.
Bilobed Flap Text: The defect edges were debeveled with a #15 scalpel blade.  Given the location of the defect and the proximity to free margins a bilobe flap was deemed most appropriate.  Using a sterile surgical marker, an appropriate bilobe flap drawn around the defect.    The area thus outlined was incised deep to adipose tissue with a #15 scalpel blade.  The skin margins were undermined to an appropriate distance in all directions utilizing iris scissors.
Eliptical Excision Additional Text (Leave Blank If You Do Not Want): The margin was drawn around the clinically apparent lesion.  An elliptical shape was then drawn on the skin incorporating the lesion and margins.  Incisions were then made along these lines to the appropriate tissue plane and the lesion was extirpated.
X Size Of Lesion In Cm (Optional): 0.4
Skin Substitute Text: The defect edges were debeveled with a #15 scalpel blade.  Given the location of the defect, shape of the defect and the proximity to free margins a skin substitute graft was deemed most appropriate.  The graft material was trimmed to fit the size of the defect. The graft was then placed in the primary defect and oriented appropriately.
Orbicularis Oris Muscle Flap Text: The defect edges were debeveled with a #15 scalpel blade.  Given that the defect affected the competency of the oral sphincter an orbicularis oris muscle flap was deemed most appropriate to restore this competency and normal muscle function.  Using a sterile surgical marker, an appropriate flap was drawn incorporating the defect. The area thus outlined was incised with a #15 scalpel blade.
Hemigard Retention Suture: 2-0 Nylon
Ftsg Text: The defect edges were debeveled with a #15 scalpel blade.  Given the location of the defect, shape of the defect and the proximity to free margins a full thickness skin graft was deemed most appropriate.  Using a sterile surgical marker, the primary defect shape was transferred to the donor site. The area thus outlined was incised deep to adipose tissue with a #15 scalpel blade.  The harvested graft was then trimmed of adipose tissue until only dermis and epidermis was left.  The skin margins of the secondary defect were undermined to an appropriate distance in all directions utilizing iris scissors.  The secondary defect was closed with interrupted buried subcutaneous sutures.  The skin edges were then re-apposed with running  sutures.  The skin graft was then placed in the primary defect and oriented appropriately.
Epidermal Closure Graft Donor Site (Optional): simple interrupted
Complex Repair And W Plasty Text: The defect edges were debeveled with a #15 scalpel blade.  The primary defect was closed partially with a complex linear closure.  Given the location of the remaining defect, shape of the defect and the proximity to free margins a W plasty was deemed most appropriate for complete closure of the defect.  Using a sterile surgical marker, an appropriate advancement flap was drawn incorporating the defect and placing the expected incisions within the relaxed skin tension lines where possible.    The area thus outlined was incised deep to adipose tissue with a #15 scalpel blade.  The skin margins were undermined to an appropriate distance in all directions utilizing iris scissors.
V-Y Plasty Text: The defect edges were debeveled with a #15 scalpel blade.  Given the location of the defect, shape of the defect and the proximity to free margins an V-Y advancement flap was deemed most appropriate.  Using a sterile surgical marker, an appropriate advancement flap was drawn incorporating the defect and placing the expected incisions within the relaxed skin tension lines where possible.    The area thus outlined was incised deep to adipose tissue with a #15 scalpel blade.  The skin margins were undermined to an appropriate distance in all directions utilizing iris scissors.
Crescentic Advancement Flap Text: The defect edges were debeveled with a #15 scalpel blade.  Given the location of the defect and the proximity to free margins a crescentic advancement flap was deemed most appropriate.  Using a sterile surgical marker, the appropriate advancement flap was drawn incorporating the defect and placing the expected incisions within the relaxed skin tension lines where possible.    The area thus outlined was incised deep to adipose tissue with a #15 scalpel blade.  The skin margins were undermined to an appropriate distance in all directions utilizing iris scissors.
Complex Repair And Melolabial Flap Text: The defect edges were debeveled with a #15 scalpel blade.  The primary defect was closed partially with a complex linear closure.  Given the location of the remaining defect, shape of the defect and the proximity to free margins a melolabial flap was deemed most appropriate for complete closure of the defect.  Using a sterile surgical marker, an appropriate advancement flap was drawn incorporating the defect and placing the expected incisions within the relaxed skin tension lines where possible.    The area thus outlined was incised deep to adipose tissue with a #15 scalpel blade.  The skin margins were undermined to an appropriate distance in all directions utilizing iris scissors.
Alar Island Pedicle Flap Text: The defect edges were debeveled with a #15 scalpel blade.  Given the location of the defect, shape of the defect and the proximity to the alar rim an island pedicle advancement flap was deemed most appropriate.  Using a sterile surgical marker, an appropriate advancement flap was drawn incorporating the defect, outlining the appropriate donor tissue and placing the expected incisions within the nasal ala running parallel to the alar rim. The area thus outlined was incised with a #15 scalpel blade.  The skin margins were undermined minimally to an appropriate distance in all directions around the primary defect and laterally outward around the island pedicle utilizing iris scissors.  There was minimal undermining beneath the pedicle flap.
Repair Performed By Another Provider Text (Leave Blank If You Do Not Want): After the tissue was excised the defect was repaired by another provider.
Complex Repair And Single Advancement Flap Text: The defect edges were debeveled with a #15 scalpel blade.  The primary defect was closed partially with a complex linear closure.  Given the location of the remaining defect, shape of the defect and the proximity to free margins a single advancement flap was deemed most appropriate for complete closure of the defect.  Using a sterile surgical marker, an appropriate advancement flap was drawn incorporating the defect and placing the expected incisions within the relaxed skin tension lines where possible.    The area thus outlined was incised deep to adipose tissue with a #15 scalpel blade.  The skin margins were undermined to an appropriate distance in all directions utilizing iris scissors.
Detail Level: Detailed
Banner Transposition Flap Text: The defect edges were debeveled with a #15 scalpel blade.  Given the location of the defect and the proximity to free margins a Banner transposition flap was deemed most appropriate.  Using a sterile surgical marker, an appropriate flap drawn around the defect. The area thus outlined was incised deep to adipose tissue with a #15 scalpel blade.  The skin margins were undermined to an appropriate distance in all directions utilizing iris scissors.
Fusiform Excision Additional Text (Leave Blank If You Do Not Want): The margin was drawn around the clinically apparent lesion.  A fusiform shape was then drawn on the skin incorporating the lesion and margins.  Incisions were then made along these lines to the appropriate tissue plane and the lesion was extirpated.
Ear Star Wedge Flap Text: The defect edges were debeveled with a #15 blade scalpel.  Given the location of the defect and the proximity to free margins (helical rim) an ear star wedge flap was deemed most appropriate.  Using a sterile surgical marker, the appropriate flap was drawn incorporating the defect and placing the expected incisions between the helical rim and antihelix where possible.  The area thus outlined was incised through and through with a #15 scalpel blade.
Graft Donor Site Bandage (Optional-Leave Blank If You Don't Want In Note): Steri-strips and a pressure bandage were applied to the donor site.
Surgeon (Optional): Savannah Dye, MPhil, MD
Billing Type: Third-Party Bill
Deep Sutures: 6-0 PGLC
Staged Advancement Flap Text: The defect edges were debeveled with a #15 scalpel blade.  Given the location of the defect, shape of the defect and the proximity to free margins a staged advancement flap was deemed most appropriate.  Using a sterile surgical marker, an appropriate advancement flap was drawn incorporating the defect and placing the expected incisions within the relaxed skin tension lines where possible. The area thus outlined was incised deep to adipose tissue with a #15 scalpel blade.  The skin margins were undermined to an appropriate distance in all directions utilizing iris scissors.
Complex Repair And Epidermal Autograft Text: The defect edges were debeveled with a #15 scalpel blade.  The primary defect was closed partially with a complex linear closure.  Given the location of the defect, shape of the defect and the proximity to free margins an epidermal autograft was deemed most appropriate to repair the remaining defect.  The graft was trimmed to fit the size of the remaining defect.  The graft was then placed in the primary defect, oriented appropriately, and sutured into place.
Cheek-To-Nose Interpolation Flap Text: A decision was made to reconstruct the defect utilizing an interpolation axial flap and a staged reconstruction.  A telfa template was made of the defect.  This telfa template was then used to outline the Cheek-To-Nose Interpolation flap.  The donor area for the pedicle flap was then injected with anesthesia.  The flap was excised through the skin and subcutaneous tissue down to the layer of the underlying musculature.  The interpolation flap was carefully excised within this deep plane to maintain its blood supply.  The edges of the donor site were undermined.   The donor site was closed in a primary fashion.  The pedicle was then rotated into position and sutured.  Once the tube was sutured into place, adequate blood supply was confirmed with blanching and refill.  The pedicle was then wrapped with xeroform gauze and dressed appropriately with a telfa and gauze bandage to ensure continued blood supply and protect the attached pedicle.
V-Y Flap Text: The defect edges were debeveled with a #15 scalpel blade.  Given the location of the defect, shape of the defect and the proximity to free margins a V-Y flap was deemed most appropriate.  Using a sterile surgical marker, an appropriate advancement flap was drawn incorporating the defect and placing the expected incisions within the relaxed skin tension lines where possible.    The area thus outlined was incised deep to adipose tissue with a #15 scalpel blade.  The skin margins were undermined to an appropriate distance in all directions utilizing iris scissors.
Anesthesia Type: 1% lidocaine with epinephrine and a 1:10 solution of 8.4% sodium bicarbonate
Complex Repair And Double M Plasty Text: The defect edges were debeveled with a #15 scalpel blade.  The primary defect was closed partially with a complex linear closure.  Given the location of the remaining defect, shape of the defect and the proximity to free margins a double M plasty was deemed most appropriate for complete closure of the defect.  Using a sterile surgical marker, an appropriate advancement flap was drawn incorporating the defect and placing the expected incisions within the relaxed skin tension lines where possible.    The area thus outlined was incised deep to adipose tissue with a #15 scalpel blade.  The skin margins were undermined to an appropriate distance in all directions utilizing iris scissors.
Information: Selecting Yes will display possible errors in your note based on the variables you have selected. This validation is only offered as a suggestion for you. PLEASE NOTE THAT THE VALIDATION TEXT WILL BE REMOVED WHEN YOU FINALIZE YOUR NOTE. IF YOU WANT TO FAX A PRELIMINARY NOTE YOU WILL NEED TO TOGGLE THIS TO 'NO' IF YOU DO NOT WANT IT IN YOUR FAXED NOTE.
Double O-Z Plasty Text: The defect edges were debeveled with a #15 scalpel blade.  Given the location of the defect, shape of the defect and the proximity to free margins a Double O-Z plasty (double transposition flap) was deemed most appropriate.  Using a sterile surgical marker, the appropriate transposition flaps were drawn incorporating the defect and placing the expected incisions within the relaxed skin tension lines where possible. The area thus outlined was incised deep to adipose tissue with a #15 scalpel blade.  The skin margins were undermined to an appropriate distance in all directions utilizing iris scissors.  Hemostasis was achieved with electrocautery.  The flaps were then transposed into place, one clockwise and the other counterclockwise, and anchored with interrupted buried subcutaneous sutures.
Suturegard Intro: Intraoperative tissue expansion was performed, utilizing the SUTUREGARD device, in order to reduce wound tension.
Chonodrocutaneous Helical Advancement Flap Text: The defect edges were debeveled with a #15 scalpel blade.  Given the location of the defect and the proximity to free margins a chondrocutaneous helical advancement flap was deemed most appropriate.  Using a sterile surgical marker, the appropriate advancement flap was drawn incorporating the defect and placing the expected incisions within the relaxed skin tension lines where possible.    The area thus outlined was incised deep to adipose tissue with a #15 scalpel blade.  The skin margins were undermined to an appropriate distance in all directions utilizing iris scissors.
Complex Repair And Bilobe Flap Text: The defect edges were debeveled with a #15 scalpel blade.  The primary defect was closed partially with a complex linear closure.  Given the location of the remaining defect, shape of the defect and the proximity to free margins a bilobe flap was deemed most appropriate for complete closure of the defect.  Using a sterile surgical marker, an appropriate advancement flap was drawn incorporating the defect and placing the expected incisions within the relaxed skin tension lines where possible.    The area thus outlined was incised deep to adipose tissue with a #15 scalpel blade.  The skin margins were undermined to an appropriate distance in all directions utilizing iris scissors.
Island Pedicle Flap With Canthal Suspension Text: The defect edges were debeveled with a #15 scalpel blade.  Given the location of the defect, shape of the defect and the proximity to free margins an island pedicle advancement flap was deemed most appropriate.  Using a sterile surgical marker, an appropriate advancement flap was drawn incorporating the defect, outlining the appropriate donor tissue and placing the expected incisions within the relaxed skin tension lines where possible. The area thus outlined was incised deep to adipose tissue with a #15 scalpel blade.  The skin margins were undermined to an appropriate distance in all directions around the primary defect and laterally outward around the island pedicle utilizing iris scissors.  There was minimal undermining beneath the pedicle flap. A suspension suture was placed in the canthal tendon to prevent tension and prevent ectropion.
Complex Repair And Skin Substitute Graft Text: The defect edges were debeveled with a #15 scalpel blade.  The primary defect was closed partially with a complex linear closure.  Given the location of the remaining defect, shape of the defect and the proximity to free margins a skin substitute graft was deemed most appropriate to repair the remaining defect.  The graft was trimmed to fit the size of the remaining defect.  The graft was then placed in the primary defect, oriented appropriately, and sutured into place.
Wound Care: Petrolatum
Purse String (Simple) Text: Given the location of the defect and the characteristics of the surrounding skin a purse string simple closure was deemed most appropriate.  Undermining was performed circumferentially around the surgical defect.  A purse string suture was then placed and tightened.
Helical Rim Text: The closure involved the helical rim.

## 2022-09-09 NOTE — PROCEDURE: MIPS QUALITY
Quality 265: Biopsy Follow-Up: Biopsy results reviewed, communicated, tracked, and documented
Detail Level: Detailed
Quality 431: Preventive Care And Screening: Unhealthy Alcohol Use - Screening: Patient not identified as an unhealthy alcohol user when screened for unhealthy alcohol use using a systematic screening method
Quality 110: Preventive Care And Screening: Influenza Immunization: Influenza Immunization previously received during influenza season
Quality 130: Documentation Of Current Medications In The Medical Record: Current Medications Documented
Quality 226: Preventive Care And Screening: Tobacco Use: Screening And Cessation Intervention: Patient screened for tobacco use and is an ex/non-smoker

## 2022-09-12 PROBLEM — Z79.810 LONG-TERM CURRENT USE OF TAMOXIFEN: Status: ACTIVE | Noted: 2022-09-12

## 2022-09-16 ENCOUNTER — APPOINTMENT (OUTPATIENT)
Dept: URBAN - METROPOLITAN AREA CLINIC 260 | Age: 42
Setting detail: DERMATOLOGY
End: 2022-09-16

## 2022-09-16 DIAGNOSIS — Z48.02 ENCOUNTER FOR REMOVAL OF SUTURES: ICD-10-CM

## 2022-09-16 PROCEDURE — OTHER COUNSELING: OTHER

## 2022-09-16 PROCEDURE — OTHER PHOTO-DOCUMENTATION: OTHER

## 2022-09-16 PROCEDURE — OTHER MIPS QUALITY: OTHER

## 2022-09-16 PROCEDURE — OTHER SUTURE REMOVAL (GLOBAL PERIOD): OTHER

## 2022-09-16 PROCEDURE — 99024 POSTOP FOLLOW-UP VISIT: CPT

## 2022-09-16 ASSESSMENT — LOCATION DETAILED DESCRIPTION DERM: LOCATION DETAILED: RIGHT INFERIOR CENTRAL MALAR CHEEK

## 2022-09-16 ASSESSMENT — LOCATION ZONE DERM: LOCATION ZONE: FACE

## 2022-09-16 ASSESSMENT — LOCATION SIMPLE DESCRIPTION DERM: LOCATION SIMPLE: RIGHT CHEEK

## 2022-09-16 NOTE — PROCEDURE: SUTURE REMOVAL (GLOBAL PERIOD)
Add 78848 Cpt? (Important Note: In 2017 The Use Of 03768 Is Being Tracked By Cms To Determine Future Global Period Reimbursement For Global Periods): yes
Detail Level: Detailed

## 2022-09-16 NOTE — PROCEDURE: MIPS QUALITY
Quality 130: Documentation Of Current Medications In The Medical Record: Current Medications Documented
Quality 265: Biopsy Follow-Up: Biopsy results reviewed, communicated, tracked, and documented
Detail Level: Detailed
Quality 431: Preventive Care And Screening: Unhealthy Alcohol Use - Screening: Patient not identified as an unhealthy alcohol user when screened for unhealthy alcohol use using a systematic screening method
Quality 110: Preventive Care And Screening: Influenza Immunization: Influenza Immunization previously received during influenza season
Quality 226: Preventive Care And Screening: Tobacco Use: Screening And Cessation Intervention: Patient screened for tobacco use and is an ex/non-smoker

## 2022-09-25 ENCOUNTER — HEALTH MAINTENANCE LETTER (OUTPATIENT)
Age: 42
End: 2022-09-25

## 2022-10-28 NOTE — PROGRESS NOTES
[facility] Hematology and Oncology Progress Note    Patient: Antonella Jamison  MRN: [id number]  Date of Service: Sep 21, 2021         Reason for Visit    Chief Complaint   Patient presents with     Oncology Clinic Visit     Breast Cancer       Assessment and Plan    Cancer Staging  Malignant neoplasm of central portion of left breast in female, estrogen receptor positive (H)  Staging form: Breast, AJCC 8th Edition  - Pathologic stage from 6/30/2021: Stage IA (pT1c, pN0(sn), cM0, G1, ER+, NE+, HER2-, Oncotype DX score: 11) - Signed by Sheri Rosa MD on 9/27/2021  - Clinical: No stage assigned - Unsigned      ECOG Performance    0 - Independent     Pain  Pain Score: No Pain (0)    #.  Right breast pain and palpable self palpated mass   Will obtain diagnostic mammogram and ultrasound.    #.  Stage IA (pT1c N0 M0) invasive ductal carcinoma of the upper outer quadrant of the left breast, ER positive, NE positive, HER-2 negative.  Oncotype DX 11/3% / <1%.  #.  Premenopausal.  LMP 9/18/2021.   Tolerates tamoxifen well.  Recommended to continue tamoxifen at this point.   Follow-up clinical exam in 4 months.    #.  Alcohol use   She enjoys wine and she is worried about alcohol use and risk of breast cancer recurrence.  We reviewed the literature together.  I told her that there is an association of increased alcohol use with increased risk of breast cancer recurrence.  The amount of alcohol use is not consistent among clinical studies.  In addition to that, the clinical studies have multiple flaws and will not be conclusive no the lower surface amount.  I advised the lowest amount of alcohol use as possible.    Encounter Diagnoses:    Problem List Items Addressed This Visit        Oncology Diagnoses    Malignant neoplasm of central portion of left breast in female, estrogen receptor positive (H)      Other Visit Diagnoses     Lump in upper inner quadrant of right breast    -  Primary             CC: Rosemary Mata,  > 16 inches MD   ______________________________________________________________________________  Diagnosis  5/2021-patient self palpated a lump in her left breast.   Invasive ductal carcinoma of the upper outer quadrant of the left breast.   15 mm, grade 1.   DCIS +   pT1c N0   ER positive (99%), MT positive (97%), HER-2/kelsey negative by IHC (1+).   Oncotype DX 11/3%/<1%    Treatment to date  6/9/2021-left breast lumpectomy and left sentinel lymph node biopsy.    6/14/2021-underwent reexcision due to close margins.  7/28/2021-8/24/2021-adjuvant radiation to the breast 5240 cGy in 20 treatments.  (Dr. Mayer)  8/2021-initiated tamoxifen.    History of Present Illness    Ms. Antonella Jamison presented herself today for follow-up.  She reported she felt a lump in the right breast with some discomfort which she felt the similar in her left breast when she was diagnosed with left breast cancer.  She takes tamoxifen regularly.  No major intolerable side effects.  She has questions regarding safe amount of alcohol use for her.    Review of systems  Apart from describing in HPI, the remainder of comprehensive ROS was negative.    Past History    Past Medical History:   Diagnosis Date     Cancer (H)      Infectious viral hepatitis      Personal history of malaria        Past Surgical History:   Procedure Laterality Date     OTHER SURGICAL HISTORY      Hairy nevis     MT MASTECTOMY, PARTIAL Left 6/9/2021    Procedure: Left Lumpectomy (With MSC u/s): [place] Lymph Node Biopsy;  Surgeon: Sigrid Ramirez MD;  Location: [facility];  Service: General     MT MASTECTOMY, PARTIAL Left 6/14/2021    Procedure: Evacuation Hematoma, Re-excision Lumpectomy;  Surgeon: Sigrid Ramirez MD;  Location: [facility];  Service: General     US SENTINEL NODE INJECTION  6/9/2021     VAGINAL DELIVERY       WISDOM TOOTH EXTRACTION       ZZC NONSPECIFIC PROCEDURE      nevus removal         Physical Exam    /74 (BP Location: Left arm, Cuff Size:  "Adult Regular)   Pulse 71   Resp 16   Ht 1.753 m (5' 9\")   Wt 61.1 kg (134 lb 9.6 oz)   SpO2 100%   BMI 19.88 kg/m        General: alert, awake, not in acute distress  HEENT: Head: Normal, normocephalic, atraumatic.  Eye: Normal external eye, conjunctiva, lids cornea, GABINO.  Nose: Normal external nose, mucus membranes and septum.  Pharynx: Normal buccal mucosa. Normal pharynx.  Neck / Thyroid: Supple, no masses, nodes, nodules or enlargement.  Lymphatics: No abnormally enlarged lymph nodes.  Chest: Normal chest wall and respirations. Clear to auscultation.  Breasts: Dense breast tissue.  No palpable masses.  Heart: S1 S2 RRR, no murmur.   Abdomen: abdomen is soft without significant tenderness, masses, organomegaly or guarding  Extremities: normal strength, tone, and muscle mass  Skin: normal. no rash or abnormalities  CNS: non focal.    Lab Results    No results found for this or any previous visit (from the past 168 hour(s)).    Imaging    US Breast Right    Result Date: 9/23/2021  EXAM: ULTRASOUND BREAST UNILATERAL RIGHT LIMITED LOCATION: [facility] DATE/TIME: 9/23/2021 2:32 PM INDICATION: Lump at the upper inner aspect of the right breast. History of left breast cancer status post breast conservation earlier this year. COMPARISON: Mammogram and ultrasound on 05/18/2021 ULTRASOUND FINDINGS: Targeted ultrasound was performed at the site of palpable lump as directed by the patient at approximately 12:00 2 cm from the nipple. Normal dense fibroglandular breast tissue is visualized. No masses or suspicious findings. No evidence for malignancy. She had a mammogram of the right breast within the past 6 months therefore this was not repeated. Per [employer] high risk screening with MRI should be considered given her personal history of left breast cancer diagnosed at age less than than 50 and dense breast tissue.     IMPRESSION: ACR BI-RADS Category 1: Negative. Results " given to the patient who should resume routine screening mammography. Also, consider annual high risk screening breast MRI in addition to annual mammography. Further management of lump should be based on clinical grounds.    Signed by: Sheri Rosa MD

## 2022-11-08 NOTE — TELEPHONE ENCOUNTER
[facility]: Cancer Care                                                                                          Refill request was received via fax from [facility] for Tamoxifen, last filled on 3/12/22, 90 tablets, 3 refills. Called [facility] to relay that there are still refills on file. Pharmacist relayed that the refill went out in error. She will note in their file.    Signature:  Juanis Galdamez RN

## 2022-12-14 ENCOUNTER — LAB REQUISITION (OUTPATIENT)
Dept: LAB | Facility: CLINIC | Age: 42
End: 2022-12-14

## 2022-12-14 DIAGNOSIS — Z01.419 ENCOUNTER FOR GYNECOLOGICAL EXAMINATION (GENERAL) (ROUTINE) WITHOUT ABNORMAL FINDINGS: ICD-10-CM

## 2022-12-14 PROCEDURE — G0145 SCR C/V CYTO,THINLAYER,RESCR: HCPCS | Performed by: FAMILY MEDICINE

## 2022-12-14 PROCEDURE — 87624 HPV HI-RISK TYP POOLED RSLT: CPT | Performed by: FAMILY MEDICINE

## 2022-12-19 LAB
BKR LAB AP GYN ADEQUACY: NORMAL
BKR LAB AP GYN INTERPRETATION: NORMAL
BKR LAB AP HPV REFLEX: NORMAL
BKR LAB AP LMP: NORMAL
BKR LAB AP PREVIOUS ABNORMAL: NORMAL
PATH REPORT.COMMENTS IMP SPEC: NORMAL
PATH REPORT.COMMENTS IMP SPEC: NORMAL
PATH REPORT.RELEVANT HX SPEC: NORMAL

## 2022-12-21 LAB
HUMAN PAPILLOMA VIRUS 16 DNA: NEGATIVE
HUMAN PAPILLOMA VIRUS 18 DNA: NEGATIVE
HUMAN PAPILLOMA VIRUS FINAL DIAGNOSIS: NORMAL
HUMAN PAPILLOMA VIRUS OTHER HR: NEGATIVE

## 2023-01-18 ENCOUNTER — LAB REQUISITION (OUTPATIENT)
Dept: LAB | Facility: CLINIC | Age: 43
End: 2023-01-18

## 2023-01-18 DIAGNOSIS — N89.8 OTHER SPECIFIED NONINFLAMMATORY DISORDERS OF VAGINA: ICD-10-CM

## 2023-01-18 PROCEDURE — 87086 URINE CULTURE/COLONY COUNT: CPT | Performed by: NURSE PRACTITIONER

## 2023-01-20 LAB — BACTERIA UR CULT: NO GROWTH

## 2023-02-02 ENCOUNTER — ONCOLOGY VISIT (OUTPATIENT)
Dept: ONCOLOGY | Facility: CLINIC | Age: 43
End: 2023-02-02
Attending: INTERNAL MEDICINE
Payer: COMMERCIAL

## 2023-02-02 VITALS
OXYGEN SATURATION: 100 % | HEIGHT: 69 IN | HEART RATE: 71 BPM | DIASTOLIC BLOOD PRESSURE: 77 MMHG | SYSTOLIC BLOOD PRESSURE: 116 MMHG | BODY MASS INDEX: 20.41 KG/M2 | WEIGHT: 137.8 LBS | RESPIRATION RATE: 16 BRPM | TEMPERATURE: 98.4 F

## 2023-02-02 DIAGNOSIS — N63.12 MASS OF UPPER INNER QUADRANT OF RIGHT BREAST: Primary | ICD-10-CM

## 2023-02-02 PROCEDURE — G0463 HOSPITAL OUTPT CLINIC VISIT: HCPCS | Performed by: INTERNAL MEDICINE

## 2023-02-02 PROCEDURE — 99214 OFFICE O/P EST MOD 30 MIN: CPT | Performed by: INTERNAL MEDICINE

## 2023-02-02 RX ORDER — FLUCONAZOLE 150 MG/1
TABLET ORAL
COMMUNITY
Start: 2023-01-18

## 2023-02-02 ASSESSMENT — PAIN SCALES - GENERAL: PAINLEVEL: NO PAIN (0)

## 2023-02-02 NOTE — PROGRESS NOTES
"Oncology Rooming Note    February 2, 2023 3:32 PM   Antonella Jamison is a 42 year old female who presents for:    Chief Complaint   Patient presents with     Oncology Clinic Visit     Initial Vitals: /77 (Patient Position: Sitting)   Pulse 71   Temp 98.4  F (36.9  C) (Oral)   Resp 16   Ht 1.753 m (5' 9\")   Wt 62.5 kg (137 lb 12.8 oz)   SpO2 100%   BMI 20.35 kg/m   Estimated body mass index is 20.35 kg/m  as calculated from the following:    Height as of this encounter: 1.753 m (5' 9\").    Weight as of this encounter: 62.5 kg (137 lb 12.8 oz). Body surface area is 1.74 meters squared.  No Pain (0) Comment: Data Unavailable   No LMP recorded.  Allergies reviewed: Yes  Medications reviewed: Yes    Medications: Medication refills not needed today.  Pharmacy name entered into [facility]: [facility] #[id number] [address]  AT [facility]    Clinical concerns: Lab question.       Radha Galvez LPN            "

## 2023-02-02 NOTE — PROGRESS NOTES
[facility] Hematology and Oncology Progress Note    Patient: Antonella Jamison  MRN: [id number]  Date of Service: Feb 2, 2023         Reason for Visit    Chief Complaint   Patient presents with     Oncology Clinic Visit       Assessment and Plan     Cancer Staging   Malignant neoplasm of central portion of left breast in female, estrogen receptor positive (H)  Staging form: Breast, AJCC 8th Edition  - Pathologic stage from 6/30/2021: Stage IA (pT1c, pN0(sn), cM0, G1, ER+, WI+, HER2-, Oncotype DX score: 11) - Signed by Sheri Rosa MD on 9/27/2021  - Clinical: No stage assigned - Unsigned      ECOG Performance    0 - Independent     Pain  Pain Score: No Pain (0)    #.  Nodule in upper outer quadrant of the right breast   Last mammogram from 6/2022 was benign. Exam showed fibroglandular tissue probably similar but persistent from the last exam a few months ago. It was previously evaluated by ultrasound in 11/2021 and then diagnostic mammogram in 6/1/2022. She is very concerned. To get a better evaluation, requested ultrasound.    #.  Stage IA (pT1c N0 M0) invasive ductal carcinoma of the upper outer quadrant of the left breast, ER positive, WI positive, HER-2 negative.  Oncotype DX 11/3% / <1%.  #.  Premenopausal.     She tolerates tamoxifen reasonably well at this point.  She will continue tamoxifen.   Follow-up mammogram in 6/2023.   Bacterial vaginosis is not likely direct relationship with tamoxifen use.  Vaginal yeast infection is likely secondary to antibiotic use for bacterial vaginosis.   Follow-up clinical exam in 6 months.    Encounter Diagnoses:    Problem List Items Addressed This Visit    None  Visit Diagnoses     Mass of upper inner quadrant of right breast    -  Primary    Relevant Orders    US Breast Right Limited 1-3 Quadrants           CC: Rosemary Mata MD   ______________________________________________________________________________  Diagnosis  5/2021-patient self palpated a lump in her  "left breast.   Invasive ductal carcinoma of the upper outer quadrant of the left breast.   15 mm, grade 1.   DCIS +   pT1c N0   ER positive (99%), TX positive (97%), HER-2/kelsey negative by IHC (1+).   Oncotype DX 11/3%/<1%    Treatment to date  6/9/2021-left breast lumpectomy and left sentinel lymph node biopsy.    6/14/2021-underwent reexcision due to close margins.  7/28/2021-8/24/2021-adjuvant radiation to the breast 5240 cGy in 20 treatments.  (Dr. Mayer)  8/2021-initiated tamoxifen.    History of Present Illness    Ms. Antonella Jamison presented herself today for further evaluation of palpable lump in her right breast. She reported that she has been watching it closely and she thinks it is getting larger. No changes in skin color. No pain.    She still has frequent BV, vaginal yeast infection.     LMP- 1/28/2023, regular.    Review of systems  Apart from describing in HPI, the remainder of comprehensive ROS was negative.    Past History    Past Medical History:   Diagnosis Date     Cancer (H)      Infectious viral hepatitis      Personal history of malaria        Past Surgical History:   Procedure Laterality Date     OTHER SURGICAL HISTORY      Hairy nevis     [place] MASTECTOMY, PARTIAL Left 6/9/2021    Procedure: Left Lumpectomy (With MSC u/s): [place] Lymph Node Biopsy;  Surgeon: Sigrid Ramirez MD;  Location: [facility];  Service: General     TX MASTECTOMY, PARTIAL Left 6/14/2021    Procedure: Evacuation Hematoma, Re-excision Lumpectomy;  Surgeon: Sigrid Ramirez MD;  Location: [facility];  Service: General     US SENTINEL NODE INJECTION  6/9/2021     VAGINAL DELIVERY       WISDOM TOOTH EXTRACTION       ZZC NONSPECIFIC PROCEDURE      nevus removal         Physical Exam    /77 (Patient Position: Sitting)   Pulse 71   Temp 98.4  F (36.9  C) (Oral)   Resp 16   Ht 1.753 m (5' 9\")   Wt 62.5 kg (137 lb 12.8 oz)   SpO2 100%   BMI 20.35 kg/m      General: alert, awake, not in acute " distress  HEENT: Head: Normal, normocephalic, atraumatic.  Eye: Normal external eye, conjunctiva, lids cornea, GABINO.  Pharynx: Normal buccal mucosa. Normal pharynx.  Neck / Thyroid: Supple, no masses, nodes, nodules or enlargement.  Lymphatics: No abnormally enlarged lymph nodes.  Chest: Normal chest wall and respirations. Clear to auscultation.  Breasts: a small 0.5 cm fibroglandular tissue density in the upper outer quadrant of the right breast.  Otherwise unremarkable exam.  Abdomen: abdomen is soft without significant tenderness, masses, organomegaly or guarding  Extremities: normal strength, tone, and muscle mass  Skin: normal. no rash or abnormalities  CNS: non focal.    Lab Results    No results found for this or any previous visit (from the past 168 hour(s)).    Imaging    No results found.     30 minutes spent on the date of the encounter doing chart review, history and exam, documentation and further activities as noted above.    Signed by: Sheri Rosa MD

## 2023-02-02 NOTE — LETTER
"    2/2/2023         RE: Antonella Jamison  [address] [address]        Dear Colleague,    Thank you for referring your patient, Antonella Jamison, to the [facility] CANCER [facility]. Please see a copy of my visit note below.    Oncology Rooming Note    February 2, 2023 3:32 PM   Antonella Jamison is a 42 year old female who presents for:    Chief Complaint   Patient presents with     Oncology Clinic Visit     Initial Vitals: /77 (Patient Position: Sitting)   Pulse 71   Temp 98.4  F (36.9  C) (Oral)   Resp 16   Ht 1.753 m (5' 9\")   Wt 62.5 kg (137 lb 12.8 oz)   SpO2 100%   BMI 20.35 kg/m   Estimated body mass index is 20.35 kg/m  as calculated from the following:    Height as of this encounter: 1.753 m (5' 9\").    Weight as of this encounter: 62.5 kg (137 lb 12.8 oz). Body surface area is 1.74 meters squared.  No Pain (0) Comment: Data Unavailable   No LMP recorded.  Allergies reviewed: Yes  Medications reviewed: Yes    Medications: Medication refills not needed today.  Pharmacy name entered into [facility]: [id number] [place] [id number] [facility]  AT [facility]    Clinical concerns: Lab question.       Radha Galvez LPN              [facility] Hematology and Oncology Progress Note    Patient: Antonella Jamison  MRN: [id number]  Date of Service: Feb 2, 2023         Reason for Visit    Chief Complaint   Patient presents with     Oncology Clinic Visit       Assessment and Plan     Cancer Staging   Malignant neoplasm of central portion of left breast in female, estrogen receptor positive (H)  Staging form: Breast, AJCC 8th Edition  - Pathologic stage from 6/30/2021: Stage IA (pT1c, pN0(sn), cM0, G1, ER+, LA+, HER2-, Oncotype DX score: 11) - Signed by Sheri Rosa MD on 9/27/2021  - Clinical: No stage assigned - Unsigned      ECOG Performance    0 - Independent     Pain  Pain Score: No Pain (0)    #.  Nodule in upper outer quadrant of the right " breast   Last mammogram from 6/2022 was benign. Exam showed fibroglandular tissue probably similar but persistent from the last exam a few months ago. It was previously evaluated by ultrasound in 11/2021 and then diagnostic mammogram in 6/1/2022. She is very concerned. To get a better evaluation, requested ultrasound.    #.  Stage IA (pT1c N0 M0) invasive ductal carcinoma of the upper outer quadrant of the left breast, ER positive, NV positive, HER-2 negative.  Oncotype DX 11/3% / <1%.  #.  Premenopausal.     She tolerates tamoxifen reasonably well at this point.  She will continue tamoxifen.   Follow-up mammogram in 6/2023.   Bacterial vaginosis is not likely direct relationship with tamoxifen use.  Vaginal yeast infection is likely secondary to antibiotic use for bacterial vaginosis.   Follow-up clinical exam in 6 months.    Encounter Diagnoses:    Problem List Items Addressed This Visit    None  Visit Diagnoses     Mass of upper inner quadrant of right breast    -  Primary    Relevant Orders    US Breast Right Limited 1-3 Quadrants           CC: Rosemary Mata MD   ______________________________________________________________________________  Diagnosis  5/2021-patient self palpated a lump in her left breast.   Invasive ductal carcinoma of the upper outer quadrant of the left breast.   15 mm, grade 1.   DCIS +   pT1c N0   ER positive (99%), NV positive (97%), HER-2/kelsey negative by IHC (1+).   Oncotype DX 11/3%/<1%    Treatment to date  6/9/2021-left breast lumpectomy and left sentinel lymph node biopsy.    6/14/2021-underwent reexcision due to close margins.  7/28/2021-8/24/2021-adjuvant radiation to the breast 5240 cGy in 20 treatments.  (Dr. Mayer)  8/2021-initiated tamoxifen.    History of Present Illness    Ms. Antonella Jamison presented herself today for further evaluation of palpable lump in her right breast. She reported that she has been watching it closely and she thinks it is getting larger. No changes in  "skin color. No pain.    She still has frequent BV, vaginal yeast infection.     LMP- 1/28/2023, regular.    Review of systems  Apart from describing in HPI, the remainder of comprehensive ROS was negative.    Past History    Past Medical History:   Diagnosis Date     Cancer (H)      Infectious viral hepatitis      Personal history of malaria        Past Surgical History:   Procedure Laterality Date     OTHER SURGICAL HISTORY      Hairy nevis     [place] MASTECTOMY, PARTIAL Left 6/9/2021    Procedure: Left Lumpectomy (With MSC u/s): [place] Lymph Node Biopsy;  Surgeon: Sigrid Ramirez MD;  Location: [facility];  Service: General     [place] MASTECTOMY, PARTIAL Left 6/14/2021    Procedure: Evacuation Hematoma, Re-excision Lumpectomy;  Surgeon: Sigrid Ramirez MD;  Location: [facility];  Service: General     US SENTINEL NODE INJECTION  6/9/2021     VAGINAL DELIVERY       WISDOM TOOTH EXTRACTION       ZZC NONSPECIFIC PROCEDURE      nevus removal         Physical Exam    /77 (Patient Position: Sitting)   Pulse 71   Temp 98.4  F (36.9  C) (Oral)   Resp 16   Ht 1.753 m (5' 9\")   Wt 62.5 kg (137 lb 12.8 oz)   SpO2 100%   BMI 20.35 kg/m      General: alert, awake, not in acute distress  HEENT: Head: Normal, normocephalic, atraumatic.  Eye: Normal external eye, conjunctiva, lids cornea, GABINO.  Pharynx: Normal buccal mucosa. Normal pharynx.  Neck / Thyroid: Supple, no masses, nodes, nodules or enlargement.  Lymphatics: No abnormally enlarged lymph nodes.  Chest: Normal chest wall and respirations. Clear to auscultation.  Breasts: a small 0.5 cm fibroglandular tissue density in the upper outer quadrant of the right breast.  Otherwise unremarkable exam.  Abdomen: abdomen is soft without significant tenderness, masses, organomegaly or guarding  Extremities: normal strength, tone, and muscle mass  Skin: normal. no rash or abnormalities  CNS: non focal.    Lab Results    No results found for this or any previous " visit (from the past 168 hour(s)).    Imaging    No results found.     30 minutes spent on the date of the encounter doing chart review, history and exam, documentation and further activities as noted above.    Signed by: Sheri Rosa MD      Again, thank you for allowing me to participate in the care of your patient.        Sincerely,        Sheri Rosa MD

## 2023-02-04 ENCOUNTER — HEALTH MAINTENANCE LETTER (OUTPATIENT)
Age: 43
End: 2023-02-04

## 2023-02-06 ENCOUNTER — HOSPITAL ENCOUNTER (OUTPATIENT)
Dept: MAMMOGRAPHY | Facility: CLINIC | Age: 43
Discharge: HOME | End: 2023-02-06
Attending: INTERNAL MEDICINE
Payer: COMMERCIAL

## 2023-02-06 DIAGNOSIS — N63.12 MASS OF UPPER INNER QUADRANT OF RIGHT BREAST: ICD-10-CM

## 2023-02-06 PROCEDURE — 76642 ULTRASOUND BREAST LIMITED: CPT | Mod: RT

## 2023-02-06 PROCEDURE — 77061 BREAST TOMOSYNTHESIS UNI: CPT | Mod: RT

## 2023-05-02 DIAGNOSIS — Z17.0 MALIGNANT NEOPLASM OF CENTRAL PORTION OF LEFT BREAST IN FEMALE, ESTROGEN RECEPTOR POSITIVE (H): ICD-10-CM

## 2023-05-02 DIAGNOSIS — C50.112 MALIGNANT NEOPLASM OF CENTRAL PORTION OF LEFT BREAST IN FEMALE, ESTROGEN RECEPTOR POSITIVE (H): ICD-10-CM

## 2023-05-02 RX ORDER — TAMOXIFEN CITRATE 20 MG/1
TABLET ORAL
Qty: 90 TABLET | Refills: 3 | Status: SHIPPED | OUTPATIENT
Start: 2023-05-02 | End: 2024-02-23

## 2023-05-02 NOTE — TELEPHONE ENCOUNTER
[facility]: Cancer Care                                                                                          Prescription refill request was receied via [facility] from [facility] for Tamoxifen, last filled on 3/12/23 90 tablets, 3 refills. Patient was last seen on 2/2/23 with Dr. Rosa for follow up. Refill request will be routed to provider for consideration of filling    Signature:  Juanis Galdamez RN

## 2023-06-02 ENCOUNTER — HOSPITAL ENCOUNTER (OUTPATIENT)
Dept: MAMMOGRAPHY | Facility: CLINIC | Age: 43
Discharge: HOME | End: 2023-06-02
Attending: FAMILY MEDICINE | Admitting: FAMILY MEDICINE
Payer: COMMERCIAL

## 2023-06-02 DIAGNOSIS — Z12.31 VISIT FOR SCREENING MAMMOGRAM: ICD-10-CM

## 2023-06-02 PROCEDURE — 77067 SCR MAMMO BI INCL CAD: CPT

## 2023-07-21 ENCOUNTER — APPOINTMENT (OUTPATIENT)
Dept: URBAN - METROPOLITAN AREA CLINIC 255 | Age: 43
Setting detail: DERMATOLOGY
End: 2023-08-07

## 2023-07-21 DIAGNOSIS — L90.5 SCAR CONDITIONS AND FIBROSIS OF SKIN: ICD-10-CM

## 2023-07-21 PROCEDURE — OTHER COUNSELING: OTHER

## 2023-07-21 PROCEDURE — 99024 POSTOP FOLLOW-UP VISIT: CPT

## 2023-07-21 PROCEDURE — OTHER MIPS QUALITY: OTHER

## 2023-07-21 PROCEDURE — OTHER FRAXEL: OTHER

## 2023-07-21 ASSESSMENT — LOCATION SIMPLE DESCRIPTION DERM: LOCATION SIMPLE: RIGHT CHEEK

## 2023-07-21 ASSESSMENT — LOCATION ZONE DERM: LOCATION ZONE: FACE

## 2023-07-21 ASSESSMENT — LOCATION DETAILED DESCRIPTION DERM: LOCATION DETAILED: RIGHT INFERIOR LATERAL BUCCAL CHEEK

## 2023-07-21 NOTE — PROCEDURE: FRAXEL
Depth In Microns (Use Numbers Only, No Special Characters Or $): 0690 Depth In Microns (Use Numbers Only, No Special Characters Or $): 2938

## 2023-07-21 NOTE — PROCEDURE: FRAXEL
Medium Plastic Eye Shield Text: The ocular mucosa was anesthetized with tetracaine. Once adequate anesthesia was optained, medium plastic eye shields were inserted and remained in place until the procedure was completed.

## 2023-07-21 NOTE — PROCEDURE: FRAXEL
Small Metal Eye Shield Text: The ocular mucosa was anesthetized with tetracaine. Once adequate anesthesia was optained, small metal eye shields were inserted and remained in place until the procedure was completed.

## 2023-07-21 NOTE — PROCEDURE: FRAXEL
Small Plastic Eye Shield Text: The ocular mucosa was anesthetized with tetracaine. Once adequate anesthesia was optained, small plastic eye shields were inserted and remained in place until the procedure was completed.

## 2023-08-09 ENCOUNTER — ONCOLOGY VISIT (OUTPATIENT)
Dept: ONCOLOGY | Facility: CLINIC | Age: 43
End: 2023-08-09
Attending: INTERNAL MEDICINE
Payer: COMMERCIAL

## 2023-08-09 VITALS
HEART RATE: 75 BPM | BODY MASS INDEX: 20.29 KG/M2 | WEIGHT: 137 LBS | DIASTOLIC BLOOD PRESSURE: 81 MMHG | HEIGHT: 69 IN | RESPIRATION RATE: 16 BRPM | SYSTOLIC BLOOD PRESSURE: 111 MMHG | OXYGEN SATURATION: 98 %

## 2023-08-09 DIAGNOSIS — Z17.0 MALIGNANT NEOPLASM OF CENTRAL PORTION OF LEFT BREAST IN FEMALE, ESTROGEN RECEPTOR POSITIVE (H): Primary | ICD-10-CM

## 2023-08-09 DIAGNOSIS — C50.112 MALIGNANT NEOPLASM OF CENTRAL PORTION OF LEFT BREAST IN FEMALE, ESTROGEN RECEPTOR POSITIVE (H): Primary | ICD-10-CM

## 2023-08-09 DIAGNOSIS — Z79.810 LONG-TERM CURRENT USE OF TAMOXIFEN: ICD-10-CM

## 2023-08-09 PROCEDURE — 99214 OFFICE O/P EST MOD 30 MIN: CPT | Performed by: INTERNAL MEDICINE

## 2023-08-09 PROCEDURE — G0463 HOSPITAL OUTPT CLINIC VISIT: HCPCS | Performed by: INTERNAL MEDICINE

## 2023-08-09 RX ORDER — METRONIDAZOLE 500 MG/1
TABLET ORAL
COMMUNITY
Start: 2023-08-08

## 2023-08-09 ASSESSMENT — PAIN SCALES - GENERAL: PAINLEVEL: NO PAIN (0)

## 2023-08-09 NOTE — Clinical Note
"    8/9/2023         RE: Antonella Jamison  [address] [facility] [address]        Dear Colleague,    Thank you for referring your patient, Antonella Jamison, to the [facility]. Please see a copy of my visit note below.    Oncology Rooming Note    August 9, 2023 10:08 AM   Antonella Jamison is a 42 year old female who presents for:    Chief Complaint   Patient presents with    Oncology Clinic Visit     Malignant neoplasm of central portion of left breast in female, estrogen receptor positive     Initial Vitals: /81   Pulse 75   Resp 16   Ht 1.753 m (5' 9\")   Wt 62.1 kg (137 lb)   SpO2 98%   BMI 20.23 kg/m   Estimated body mass index is 20.23 kg/m  as calculated from the following:    Height as of this encounter: 1.753 m (5' 9\").    Weight as of this encounter: 62.1 kg (137 lb). Body surface area is 1.74 meters squared.  No Pain (0) Comment: Data Unavailable   No LMP recorded.  Allergies reviewed: Yes  Medications reviewed: Yes    Medications: Medication refills not needed today.  Pharmacy name entered into Bundle: [facility] #[id number] [address]  AT [facility]    Clinical concerns:  6 month follow up      Rosalia Medeiros              [facility] Hematology and Oncology Progress Note    Patient: Antonella Jamison  MRN: [id number]  Date of Service: Aug 9, 2023         Reason for Visit    Chief Complaint   Patient presents with     Oncology Clinic Visit     Malignant neoplasm of central portion of left breast in female, estrogen receptor positive       Assessment and Plan     Cancer Staging   Malignant neoplasm of central portion of left breast in female, estrogen receptor positive (H)  Staging form: Breast, AJCC 8th Edition  - Pathologic stage from 6/30/2021: Stage IA (pT1c, pN0(sn), cM0, G1, ER+, KS+, HER2-, Oncotype DX score: 11) - Signed by Sheri Rosa MD on 9/27/2021  - Clinical: No stage assigned - Unsigned      ECOG " Performance    0 - Independent     Pain  Pain Score: No Pain (0)    #.  Nodule in upper outer quadrant of the right breast   Last mammogram from 6/2022 was benign. Exam showed fibroglandular tissue probably similar but persistent from the last exam a few months ago. It was previously evaluated by ultrasound in 11/2021 and then diagnostic mammogram in 6/1/2022. She is very concerned. To get a better evaluation, requested ultrasound.    #.  Stage IA (pT1c N0 M0) invasive ductal carcinoma of the upper outer quadrant of the left breast, ER positive, CA positive, HER-2 negative.  Oncotype DX 11/3% / <1%.  #.  Premenopausal.     She tolerates tamoxifen reasonably well at this point.  She will continue tamoxifen.   Follow-up mammogram in 6/2023.   Bacterial vaginosis is not likely direct relationship with tamoxifen use.  Vaginal yeast infection is likely secondary to antibiotic use for bacterial vaginosis.   Follow-up clinical exam in 6 months.    Encounter Diagnoses:    Problem List Items Addressed This Visit    None         CC: Rosemary Mata MD   ______________________________________________________________________________  Diagnosis  5/2021-patient self palpated a lump in her left breast.   Invasive ductal carcinoma of the upper outer quadrant of the left breast.   15 mm, grade 1.   DCIS +   pT1c N0   ER positive (99%), CA positive (97%), HER-2/kelsey negative by IHC (1+).   Oncotype DX 11/3%/<1%    Treatment to date  6/9/2021-left breast lumpectomy and left sentinel lymph node biopsy.    6/14/2021-underwent reexcision due to close margins.  7/28/2021-8/24/2021-adjuvant radiation to the breast 5240 cGy in 20 treatments.  (Dr. Mayer)  8/2021-initiated tamoxifen.    History of Present Illness    Ms. Antonella Jamison presented herself today for further evaluation of palpable lump in her right breast. She reported that she has been watching it closely and she thinks it is getting larger. No changes in skin color. No  "pain.    She still has frequent BV, vaginal yeast infection.     LMP- 1/28/2023, regular.    Review of systems  Apart from describing in HPI, the remainder of comprehensive ROS was negative.    Past History    Past Medical History:   Diagnosis Date     Cancer (H)      Infectious viral hepatitis      Personal history of malaria        Past Surgical History:   Procedure Laterality Date     OTHER SURGICAL HISTORY      Hairy nevis     CA MASTECTOMY, PARTIAL Left 6/9/2021    Procedure: Left Lumpectomy (With MSC u/s): [place] Lymph Node Biopsy;  Surgeon: Sigrid Ramirez MD;  Location: [facility];  Service: General     CA MASTECTOMY, PARTIAL Left 6/14/2021    Procedure: Evacuation Hematoma, Re-excision Lumpectomy;  Surgeon: Sigrid Ramirez MD;  Location: [facility];  Service: General     US SENTINEL NODE INJECTION  6/9/2021     VAGINAL DELIVERY       WISDOM TOOTH EXTRACTION       ZZC NONSPECIFIC PROCEDURE      nevus removal         Physical Exam    /81   Pulse 75   Resp 16   Ht 1.753 m (5' 9\")   Wt 62.1 kg (137 lb)   LMP 07/14/2023   SpO2 98%   BMI 20.23 kg/m      General: alert, awake, not in acute distress  HEENT: Head: Normal, normocephalic, atraumatic.  Eye: Normal external eye, conjunctiva, lids cornea, GABINO.  Pharynx: Normal buccal mucosa. Normal pharynx.  Neck / Thyroid: Supple, no masses, nodes, nodules or enlargement.  Lymphatics: No abnormally enlarged lymph nodes.  Chest: Normal chest wall and respirations. Clear to auscultation.  Breasts: a small 0.5 cm fibroglandular tissue density in the upper outer quadrant of the right breast.  Otherwise unremarkable exam.  Abdomen: abdomen is soft without significant tenderness, masses, organomegaly or guarding  Extremities: normal strength, tone, and muscle mass  Skin: normal. no rash or abnormalities  CNS: non focal.    Lab Results    No results found for this or any previous visit (from the past 168 hour(s)).    Imaging    No results found. "     30 minutes spent on the date of the encounter doing chart review, history and exam, documentation and further activities as noted above.    Signed by: Sheri Rosa MD      Again, thank you for allowing me to participate in the care of your patient.        Sincerely,        Sheri Rosa MD

## 2023-08-09 NOTE — PROGRESS NOTES
"Oncology Rooming Note    August 9, 2023 10:08 AM   Antonella Jamison is a 42 year old female who presents for:    Chief Complaint   Patient presents with    Oncology Clinic Visit     Malignant neoplasm of central portion of left breast in female, estrogen receptor positive     Initial Vitals: /81   Pulse 75   Resp 16   Ht 1.753 m (5' 9\")   Wt 62.1 kg (137 lb)   SpO2 98%   BMI 20.23 kg/m   Estimated body mass index is 20.23 kg/m  as calculated from the following:    Height as of this encounter: 1.753 m (5' 9\").    Weight as of this encounter: 62.1 kg (137 lb). Body surface area is 1.74 meters squared.  No Pain (0) Comment: Data Unavailable   No LMP recorded.  Allergies reviewed: Yes  Medications reviewed: Yes    Medications: Medication refills not needed today.  Pharmacy name entered into [facility]: [facility] #[id number] [place], [place] - [address] [facility]  AT [facility]    Clinical concerns:  6 month follow up      Rosalia Medeiros            "

## 2023-08-09 NOTE — PROGRESS NOTES
[facility] Hematology and Oncology Progress Note    Patient: Antonella Jamison  MRN: [id number]  Date of Service: Aug 9, 2023         Reason for Visit    Chief Complaint   Patient presents with    Oncology Clinic Visit     Malignant neoplasm of central portion of left breast in female, estrogen receptor positive       Assessment and Plan     Cancer Staging   Malignant neoplasm of central portion of left breast in female, estrogen receptor positive (H)  Staging form: Breast, AJCC 8th Edition  - Pathologic stage from 6/30/2021: Stage IA (pT1c, pN0(sn), cM0, G1, ER+, WI+, HER2-, Oncotype DX score: 11) - Signed by Sheri Rosa MD on 9/27/2021  - Clinical: No stage assigned - Unsigned      ECOG Performance    0 - Independent     Pain  Pain Score: No Pain (0)    #.  History of stage IA (pT1c N0 M0) invasive ductal carcinoma of the upper outer quadrant of the left breast, ER positive, WI positive, HER-2 negative.  Oncotype DX 11/3% / <1%.  #.  Premenopausal.    #.  Persistent fibroglandular density of the upper inner quadrant of the right breast.  No evidence of mass suspicious for malignancy.     Clinically well.  Exam is unremarkable.  Mammogram from 6/2023 was benign.    She tolerates tamoxifen reasonably well at this point.  She will continue tamoxifen.   Follow-up clinical exam in 6 months.  She wishes to continue follow-up here..   Next screening mammogram due in 6/2024.   Discussed about the importance of regular exercises, staying in healthy body weight, and limitation of alcohol use in breast cancer survivors.    Encounter Diagnoses:    Problem List Items Addressed This Visit          Oncology Diagnoses    Malignant neoplasm of central portion of left breast in female, estrogen receptor positive (H) - Primary       Other    Long-term current use of tamoxifen          CC: Rosemary Mata MD   ______________________________________________________________________________  Diagnosis  5/2021-patient self  "palpated a lump in her left breast.   Invasive ductal carcinoma of the upper outer quadrant of the left breast.   15 mm, grade 1.   DCIS +   pT1c N0   ER positive (99%), CO positive (97%), HER-2/kelsey negative by IHC (1+).   Oncotype DX 11/3%/<1%    Treatment to date  6/9/2021-left breast lumpectomy and left sentinel lymph node biopsy.    6/14/2021-underwent reexcision due to close margins.  7/28/2021-8/24/2021-adjuvant radiation to the breast 5240 cGy in 20 treatments.  (Dr. Mayer)  8/2021-initiated tamoxifen.    History of Present Illness    Ms. Antonella Jamison presented herself today for f follow-up.  She noted persistent nodularity in the right breast.  No other changes.  She is overall doing well.  She takes tamoxifen regularly.  She is moving to [facility].  She wishes to continue follow-up here.    LMP- 7/28/2023, regular.    Review of systems  Apart from describing in HPI, the remainder of comprehensive ROS was negative.    Past History    Past Medical History:   Diagnosis Date    Cancer (H)     Infectious viral hepatitis     Personal history of malaria        Past Surgical History:   Procedure Laterality Date    OTHER SURGICAL HISTORY      Hairy nevis    CO MASTECTOMY, PARTIAL Left 6/9/2021    Procedure: Left Lumpectomy (With MSC u/s): [place] Lymph Node Biopsy;  Surgeon: Sigrid Ramirez MD;  Location: [facility];  Service: General    CO MASTECTOMY, PARTIAL Left 6/14/2021    Procedure: Evacuation Hematoma, Re-excision Lumpectomy;  Surgeon: Sigrid Ramirez MD;  Location: [facility];  Service: General    US SENTINEL NODE INJECTION  6/9/2021    VAGINAL DELIVERY      WISDOM TOOTH EXTRACTION      ZZC NONSPECIFIC PROCEDURE      nevus removal         Physical Exam    /81   Pulse 75   Resp 16   Ht 1.753 m (5' 9\")   Wt 62.1 kg (137 lb)   LMP 07/14/2023   SpO2 98%   BMI 20.23 kg/m      General: alert, awake, not in acute distress  HEENT: Head: Normal, normocephalic, " atraumatic.  Eye: Normal external eye, conjunctiva, lids cornea, GABINO.  Pharynx: Normal buccal mucosa. Normal pharynx.  Neck / Thyroid: Supple, no masses, nodes, nodules or enlargement.  Lymphatics: No abnormally enlarged lymph nodes.  Chest: Normal chest wall and respirations. Clear to auscultation.  Breasts: a small 0.5 cm fibroglandular tissue density in the upper outer quadrant of the right breast.  Otherwise unremarkable exam.  Abdomen: abdomen is soft without significant tenderness, masses, organomegaly or guarding  Extremities: normal strength, tone, and muscle mass  Skin: normal. no rash or abnormalities  CNS: non focal.    Lab Results    No results found for this or any previous visit (from the past 168 hour(s)).    Imaging    No results found.     30 minutes spent on the date of the encounter doing chart review, history and exam, documentation, communication of the treatment plan with the care team and further activities as noted above.    Signed by: Sheri Rosa MD

## 2023-08-22 ENCOUNTER — TELEPHONE (OUTPATIENT)
Dept: ONCOLOGY | Facility: CLINIC | Age: 43
End: 2023-08-22
Payer: COMMERCIAL

## 2023-08-22 NOTE — TELEPHONE ENCOUNTER
Patient calls with questions regarding Tamoxifen. She is wondering if she can get her refills at a different [facility] pharmacy. Patient is advised that the current prescription has 3 refills so she can talk to [facility] and have them transfer the prescription. She verbalizes understanding and denies further questions at this time.    Lucie Ramos RN

## 2023-08-25 ENCOUNTER — APPOINTMENT (OUTPATIENT)
Dept: URBAN - METROPOLITAN AREA CLINIC 255 | Age: 43
Setting detail: DERMATOLOGY
End: 2023-08-27

## 2023-08-25 DIAGNOSIS — L90.5 SCAR CONDITIONS AND FIBROSIS OF SKIN: ICD-10-CM

## 2023-08-25 PROCEDURE — OTHER MIPS QUALITY: OTHER

## 2023-08-25 PROCEDURE — OTHER FRAXEL: OTHER

## 2023-08-25 PROCEDURE — 99024 POSTOP FOLLOW-UP VISIT: CPT

## 2023-08-25 PROCEDURE — OTHER COUNSELING: OTHER

## 2023-08-25 ASSESSMENT — LOCATION ZONE DERM: LOCATION ZONE: FACE

## 2023-08-25 ASSESSMENT — LOCATION DETAILED DESCRIPTION DERM: LOCATION DETAILED: RIGHT INFERIOR LATERAL BUCCAL CHEEK

## 2023-08-25 ASSESSMENT — LOCATION SIMPLE DESCRIPTION DERM: LOCATION SIMPLE: RIGHT CHEEK

## 2023-08-25 NOTE — PROCEDURE: FRAXEL
No Consent: Written consent obtained, risks reviewed including but not limited to pain and incomplete improvement.

## 2023-08-25 NOTE — PROCEDURE: FRAXEL
Depth In Microns (Use Numbers Only, No Special Characters Or $): 4480 Depth In Microns (Use Numbers Only, No Special Characters Or $): 8350

## 2023-11-03 DIAGNOSIS — C50.112 MALIGNANT NEOPLASM OF CENTRAL PORTION OF LEFT BREAST IN FEMALE, ESTROGEN RECEPTOR POSITIVE (H): ICD-10-CM

## 2023-11-03 DIAGNOSIS — Z17.0 MALIGNANT NEOPLASM OF CENTRAL PORTION OF LEFT BREAST IN FEMALE, ESTROGEN RECEPTOR POSITIVE (H): ICD-10-CM

## 2023-11-03 RX ORDER — TAMOXIFEN CITRATE 20 MG/1
TABLET ORAL
Qty: 90 TABLET | Refills: 3 | OUTPATIENT
Start: 2023-11-03

## 2023-11-03 NOTE — TELEPHONE ENCOUNTER
Patient has refills on file. Confirmed with [facility] that patient still has refills available.    Susan Bazzi RN...............................11/03/23  9:43 AM

## 2024-01-25 ENCOUNTER — PATIENT OUTREACH (OUTPATIENT)
Dept: CARE COORDINATION | Facility: CLINIC | Age: 44
End: 2024-01-25
Payer: COMMERCIAL

## 2024-01-25 DIAGNOSIS — C50.112 MALIGNANT NEOPLASM OF CENTRAL PORTION OF LEFT BREAST IN FEMALE, ESTROGEN RECEPTOR POSITIVE (H): Primary | ICD-10-CM

## 2024-01-25 DIAGNOSIS — Z17.0 MALIGNANT NEOPLASM OF CENTRAL PORTION OF LEFT BREAST IN FEMALE, ESTROGEN RECEPTOR POSITIVE (H): Primary | ICD-10-CM

## 2024-01-25 NOTE — TELEPHONE ENCOUNTER
Social Work Note - Incoming Call  [facility]    Data/Intervention:     Patient Name: Antonella Jamison Goes By: Antonella    /Age: 1980 (43 year old)      Call From: Patient  Reason for Call: Insurance questions    Assessment:   Antonella called SW inquiring about Dr. Rosa being covered by her insurance. Her insurance wasn't able to find Dr. Rosa in their database. Encouraged her to call back and have them check via clinic instead of individual provider.  also gave her the best estimates of cost line with [facility] - [phone]. Let her know that support is available if any other needs arise.     Plan: SW remains available as needs arise. Pt has contact information.    CAROLINE Campos, [facility]  Adult Oncology Clinics  [place] (M,W), [place] (T) & [place] (Th)  *I am off Friday  Office: [phone]

## 2024-01-30 ENCOUNTER — PATIENT OUTREACH (OUTPATIENT)
Dept: ONCOLOGY | Facility: HOSPITAL | Age: 44
End: 2024-01-30
Payer: COMMERCIAL

## 2024-01-30 NOTE — PROGRESS NOTES
"[facility]: Cancer Care                                                                                          [facility] message sent from patient,    \"Hi Dr Rosa,     Are there any clinics you would be able to refer to me to for my check ins and mammograms?    I live in [place], [place]. My insurance is [employer].     If I should talk to someone else please advise.     Thank you\"    Message sent to Cancer Care Nurse Navigation team.    1/31/24:  Update:  Patient scheduled with Dr. Noel at [facility] on 2/23/24.    Removing self from RNCC.    Signature:  Ainsley Love RN      "

## 2024-02-23 ENCOUNTER — ONCOLOGY VISIT (OUTPATIENT)
Dept: ONCOLOGY | Facility: CLINIC | Age: 44
End: 2024-02-23
Attending: INTERNAL MEDICINE
Payer: COMMERCIAL

## 2024-02-23 VITALS
WEIGHT: 136 LBS | BODY MASS INDEX: 20.14 KG/M2 | DIASTOLIC BLOOD PRESSURE: 74 MMHG | RESPIRATION RATE: 14 BRPM | HEART RATE: 77 BPM | OXYGEN SATURATION: 99 % | SYSTOLIC BLOOD PRESSURE: 115 MMHG | HEIGHT: 69 IN

## 2024-02-23 DIAGNOSIS — Z79.810 LONG-TERM CURRENT USE OF TAMOXIFEN: ICD-10-CM

## 2024-02-23 DIAGNOSIS — C50.112 MALIGNANT NEOPLASM OF CENTRAL PORTION OF LEFT BREAST IN FEMALE, ESTROGEN RECEPTOR POSITIVE (H): Primary | ICD-10-CM

## 2024-02-23 DIAGNOSIS — Z17.0 MALIGNANT NEOPLASM OF CENTRAL PORTION OF LEFT BREAST IN FEMALE, ESTROGEN RECEPTOR POSITIVE (H): Primary | ICD-10-CM

## 2024-02-23 LAB
ALBUMIN SERPL BCG-MCNC: 4.4 G/DL (ref 3.5–5.2)
ALP SERPL-CCNC: 39 U/L (ref 40–150)
ALT SERPL W P-5'-P-CCNC: 8 U/L (ref 0–50)
ANION GAP SERPL CALCULATED.3IONS-SCNC: 10 MMOL/L (ref 7–15)
AST SERPL W P-5'-P-CCNC: 20 U/L (ref 0–45)
BASOPHILS # BLD AUTO: 0 10E3/UL (ref 0–0.2)
BASOPHILS NFR BLD AUTO: 1 %
BILIRUB SERPL-MCNC: 0.2 MG/DL
BUN SERPL-MCNC: 19.4 MG/DL (ref 6–20)
CALCIUM SERPL-MCNC: 9.2 MG/DL (ref 8.6–10)
CHLORIDE SERPL-SCNC: 102 MMOL/L (ref 98–107)
CREAT SERPL-MCNC: 0.77 MG/DL (ref 0.51–0.95)
DEPRECATED HCO3 PLAS-SCNC: 27 MMOL/L (ref 22–29)
EGFRCR SERPLBLD CKD-EPI 2021: >90 ML/MIN/1.73M2
EOSINOPHIL # BLD AUTO: 0.2 10E3/UL (ref 0–0.7)
EOSINOPHIL NFR BLD AUTO: 3 %
ERYTHROCYTE [DISTWIDTH] IN BLOOD BY AUTOMATED COUNT: 12.7 % (ref 10–15)
GLUCOSE SERPL-MCNC: 107 MG/DL (ref 70–99)
HCT VFR BLD AUTO: 39.8 % (ref 35–47)
HGB BLD-MCNC: 12.9 G/DL (ref 11.7–15.7)
IMM GRANULOCYTES # BLD: 0 10E3/UL
IMM GRANULOCYTES NFR BLD: 0 %
LYMPHOCYTES # BLD AUTO: 1.7 10E3/UL (ref 0.8–5.3)
LYMPHOCYTES NFR BLD AUTO: 29 %
MCH RBC QN AUTO: 29.9 PG (ref 26.5–33)
MCHC RBC AUTO-ENTMCNC: 32.4 G/DL (ref 31.5–36.5)
MCV RBC AUTO: 92 FL (ref 78–100)
MONOCYTES # BLD AUTO: 0.5 10E3/UL (ref 0–1.3)
MONOCYTES NFR BLD AUTO: 8 %
NEUTROPHILS # BLD AUTO: 3.4 10E3/UL (ref 1.6–8.3)
NEUTROPHILS NFR BLD AUTO: 59 %
NRBC # BLD AUTO: 0 10E3/UL
NRBC BLD AUTO-RTO: 0 /100
PLATELET # BLD AUTO: 259 10E3/UL (ref 150–450)
POTASSIUM SERPL-SCNC: 4.1 MMOL/L (ref 3.4–5.3)
PROT SERPL-MCNC: 7.5 G/DL (ref 6.4–8.3)
RBC # BLD AUTO: 4.32 10E6/UL (ref 3.8–5.2)
SODIUM SERPL-SCNC: 139 MMOL/L (ref 135–145)
WBC # BLD AUTO: 5.7 10E3/UL (ref 4–11)

## 2024-02-23 PROCEDURE — 99214 OFFICE O/P EST MOD 30 MIN: CPT | Performed by: INTERNAL MEDICINE

## 2024-02-23 PROCEDURE — 99204 OFFICE O/P NEW MOD 45 MIN: CPT | Performed by: INTERNAL MEDICINE

## 2024-02-23 PROCEDURE — 36415 COLL VENOUS BLD VENIPUNCTURE: CPT | Performed by: INTERNAL MEDICINE

## 2024-02-23 PROCEDURE — 85025 COMPLETE CBC W/AUTO DIFF WBC: CPT | Performed by: INTERNAL MEDICINE

## 2024-02-23 PROCEDURE — 80053 COMPREHEN METABOLIC PANEL: CPT | Performed by: INTERNAL MEDICINE

## 2024-02-23 RX ORDER — TAMOXIFEN CITRATE 20 MG/1
20 TABLET ORAL DAILY
Qty: 90 TABLET | Refills: 3 | Status: SHIPPED | OUTPATIENT
Start: 2024-02-23 | End: 2024-08-29

## 2024-02-23 RX ORDER — MULTIVITAMIN WITH IRON
1 TABLET ORAL DAILY
COMMUNITY

## 2024-02-23 RX ORDER — OMEGA-3/DHA/EPA/FISH OIL 60 MG-90MG
CAPSULE ORAL
COMMUNITY

## 2024-02-23 RX ORDER — FAMOTIDINE 20 MG
TABLET ORAL
COMMUNITY

## 2024-02-23 RX ORDER — LACTOBACILLUS RHAMNOSUS GG 10B CELL
1 CAPSULE ORAL 2 TIMES DAILY
COMMUNITY

## 2024-02-23 ASSESSMENT — PAIN SCALES - GENERAL: PAINLEVEL: NO PAIN (0)

## 2024-02-23 NOTE — LETTER
2/23/2024         RE: Antonella Jamison  [address] [facility] [address]        Dear Colleague,    Thank you for referring your patient, Antonella Jamison, to the [facility]. Please see a copy of my visit note below.    [facility]    Hematology/Oncology New Patient Note      Today's Date: February 23, 2024     Referring provider: Dr Sheri Rosa  Reason for Consultation: Transfer of care, breast cancer    HISTORY OF PRESENT ILLNESS: Antonella Jamison is a 43 year old female who was referred to the Hematology/Oncology Clinic for breast cancer    Patient has medical history including breast cancer, recurrent bacterial vaginosis     -5/2021 patient self palpated lump in left breast   -Patient's diagnostic imaging is not available for my review  -5/2021 ultrasound-guided left breast biopsy of 12:00 lesion, 4 cm from nipple  PATHOLOGY:  1. Invasive ductal carcinoma, measuring 0.7 cm in core biopsy      a. Gumaro grade: I of III; Gumaro score: 5 of 9      b. Angio-lymphatic invasion: Absent      c. Associated DCIS: Present      d. Subtype: Cribriform and solid      e. Grade of DCIS: 2 of 3   2. Breast Ancillary Testing:        a. Hormone Receptors:             Estrogen receptor: Positive (99%, strong staining)             Progesterone receptor: Positive (97%, moderate staining)       b. HER2 by IHC: Negative (1+ by manual morphometry)   - 6/9/2021-left breast lumpectomy and left sentinel lymph node biopsy.    PATHOLOGY:  A) LEFT BREAST, ORIENTED LUMPECTOMY:        1) INVASIVE DUCTAL CARCINOMA             a) Grade: Gumaro grade I (of III)             b) Size:  15 x 11 x 9 mm (measured and calculated in slides)   c) Margins: Uninvolved, but very close to, at 0.8 mm from the nearest   posterior margin, and 4 mm from anterior margin        2) DUCTAL CARCINOMA IN SITU: EIC Negative (SEE COMMENT)             a) Nuclear grade: Low to intermediate              b) Patterns: Cribriform and solid, with focal comedonecrosis             c) Size:  20% of the tumor, and measured up to 18 x 4 mm in   sections away from invasive carcinoma             d) Margins: Positive, at posterior (multifocal) and medial   (unifocal) margins        3) ADDITIONAL FINDINGS:             Background breast with focal secretory activities and duct   ectasia, no atypia             Focal lymph-vascular tumor involvement in block A10        4) Previous biopsy site present, with organizing changes and   foreign body giant cell reaction     B) LEFT SENTINEL LYMPH NODE, BIOPSY:        - TWO BENIGN LYMPH NODES (0/2) WITH NO EVIDENCE OF METASTATIC          CARCINOMA     PATHOLOGIC STAGE: pT1c, pN0 (sn), pM-Not applicable   -6/14/2021-underwent reexcision due to close margins (and hematoma evacuation)  - Oncotype DX breast recurrence score report:  Recurrence score result: 11  Distant recurrence risk at 9 years with adjuvant endocrine therapy alone: 3%  Group average absolute chemotherapy benefit: <1%  -7/28/2021-8/24/2021-adjuvant radiation to the breast 5240 cGy in 20 treatments.  (Dr. Mayer)  -8/2021-initiated tamoxifen    -7/21 [facility] Common hereditary cancers panel, TRAVIS and CHEK2: Negative for mutations in 47 genes tested    Lifetime estrogen exposure:  Menarche: 11  Last menstrual period: 2/24  Age of first pregnancy: 32   Number of pregnancies: 1  Weight gain: negative   Exposure to exogenous estrogen: OCPs x10 years+    Family history of:  1.  Breast cancer including male breast cancer: maternal aunt- dx in her mid 50s, stage 2, s/p mastectomy   2. Ovarian cancer: negative  3.  Pancreatic cancer: mother- pancreatic cancer- dx in early 50s, passed within 1.5 years  4.  Prostate cancer: negative  5. Diffuse gastric cancer (if lobular breast CA): negative  6. Uterine cancer: negative  7. Colon cancer: paternal GM- colon cancer in her 80s    Patient denies the following:  History of DVT/PE/VTE for  self or family  Abnormal uterine bleeding, abnormal Pap smears  Cataracts  Severe arthralgias and myalgias  Cardiovascular risk factors including hypertension, hyperlipidemia, diabetes  Osteoporosis  Current use of antidepressants      Pt reports recurrent bacterial vaginosis, currently on flagyl 2x/week, fluconazole 1x/week- plan for tx from 2/24-8/24.     LMP 2/12/24, periods are irregular (1 week early this month). Pt is on magnesium, she thinks it helps with mood changes that she relates to perimenopausal           REVIEW OF SYSTEMS:   A 14 point ROS was reviewed with pertinent positives and negatives in the HPI.        HOME MEDICATIONS:  Current Outpatient Medications   Medication Sig Dispense Refill     acetaminophen (TYLENOL) 325 MG tablet 2 tab(s)       Cyanocobalamin (RAPID B-12 ENERGY) 200 MCG/SPRAY LIQD Place 1 spray under the tongue       fish oil-omega-3 fatty acids 500 MG capsule        fluconazole (DIFLUCAN) 150 MG tablet        ibuprofen (ADVIL/MOTRIN) 200 MG tablet Take 200-400 mg by mouth       lactobacillus rhamnosus, GG, (CULTURELL) capsule Take 1 capsule by mouth 2 times daily       loratadine (CLARITIN) 10 MG tablet 1 tab(s)       magnesium 250 MG tablet Take 1 tablet by mouth daily       metroNIDAZOLE (FLAGYL) 500 MG tablet        Multiple Vitamin (MULTIVITAMIN ADULT PO) Take 1 tablet by mouth       tamoxifen (NOLVADEX) 20 MG tablet Take 1 tablet (20 mg) by mouth daily 90 tablet 3     Vitamin D, Cholecalciferol, 25 MCG (1000 UT) CAPS            ALLERGIES:  Allergies   Allergen Reactions     Cat Dander [Animal Dander] Unknown     Dog Dander [Dog Epithelium Allergy Skin Test] Unknown     Other Environmental Allergy Unknown     Seasonal Allergies      Other Reaction(s): Unknown         PAST MEDICAL HISTORY:  Past Medical History:   Diagnosis Date     Cancer (H)      Infectious viral hepatitis      Personal history of malaria          PAST SURGICAL HISTORY:  Past Surgical History:   Procedure  Laterality Date     OTHER SURGICAL HISTORY      Hairy nevis     FL MASTECTOMY, PARTIAL Left 2021    Procedure: Left Lumpectomy (With MSC u/s): [place] Lymph Node Biopsy;  Surgeon: Sigrid Ramirez MD;  Location: [facility];  Service: General     FL MASTECTOMY, PARTIAL Left 2021    Procedure: Evacuation Hematoma, Re-excision Lumpectomy;  Surgeon: Sigrid Ramirez MD;  Location: [facility];  Service: General     US SENTINEL NODE INJECTION  2021     VAGINAL DELIVERY       WISDOM TOOTH EXTRACTION       ZZC NONSPECIFIC PROCEDURE      nevus removal         SOCIAL HISTORY:  Social History     Socioeconomic History     Marital status:      Spouse name: Not on file     Number of children: 0     Years of education: 16     Highest education level: Not on file   Occupational History     Occupation: [employer]   Tobacco Use     Smoking status: Former     Packs/day: 0.25     Years: 2.00     Additional pack years: 0.00     Total pack years: 0.50     Types: Cigarettes     Quit date: 2007     Years since quittin.1     Smokeless tobacco: Never   Substance and Sexual Activity     Alcohol use: Yes     Comment: Alcoholic Drinks/day: 5 X week     Drug use: Never     Sexual activity: Not Currently   Other Topics Concern     Parent/sibling w/ CABG, MI or angioplasty before 65F 55M? No   Social History Narrative     Not on file     Social Determinants of Health     Financial Resource Strain: Not on file   Food Insecurity: Not on file   Transportation Needs: Not on file   Physical Activity: Not on file   Stress: Not on file   Social Connections: Not on file   Interpersonal Safety: Not on file   Housing Stability: Not on file         FAMILY HISTORY:  Family History   Problem Relation Age of Onset     Pancreatic Cancer Mother      Breast Cancer Maternal Aunt      Melanoma Maternal Aunt      Colon Cancer Paternal Grandmother          PHYSICAL EXAM:  Vital signs:  /74 (BP Location: Right arm)    "Pulse 77   Resp 14   Ht 1.753 m (5' 9.02\")   Wt 61.7 kg (136 lb)   LMP 2024 (Within Days)   SpO2 99%   BMI 20.07 kg/m         ECO  GENERAL/CONSTITUTIONAL: No acute distress.  EYES: Pupils are equal and round. Extraocular movements intact without nystagmus.  No scleral icterus.  RESPIRATORY: Equal chest rise.   MUSCULOSKELETAL: Warm and well-perfused, no cyanosis  NEUROLOGIC: Cranial nerves are grossly intact. Alert, oriented to person, place and time, answers questions appropriately.  INTEGUMENTARY: No rashes or jaundice.  GAIT: Steady, does not use assistive device  BREAST: pt circled are of concern- right breast, 1030 oclock, lateral breast, pec muscle palpable at that area but no nodule or cyst palpable and overall feels symmetric to left breast seated and supine. No palpable LN in either axilla or clavicle area.        LABS:  CBC RESULTS:   Recent Labs   Lab Test 21  1019   WBC 4.1   RBC 4.36   HGB 13.4   HCT 39.5   MCV 91   MCH 30.7   MCHC 33.9   RDW 12.4          Recent Labs   Lab Test 21  1700 20  1642    139   POTASSIUM 4.1 3.9   CHLORIDE 105 105   CO2 26 23   ANIONGAP 10 11   GLC 89 101   BUN 14 21   CR 0.78 0.71   CHRISTIANA 9.1 8.9         PATHOLOGY:      IMAGING:      ASSESSMENT/PLAN:  Antonella Jamison is a 43 year old female with:      #  pT1c pN0 (sn) LEFT breast IDC, ER positive, AZ positive, HER2 negative  -Patient's diagnostic imaging is not available for my review  -2021 ultrasound-guided left breast biopsy of 12:00 lesion, 4 cm from nipple  PATHOLOGY: IDC, 0.7 cm, grade 1; DCIS grade 2, ER positive (99%, strong), AZ positive (97%, moderate), HER2 negative (1+ on IHC  - 2021-left breast lumpectomy and left sentinel lymph node biopsy.    PATHOLOGY: IDC, grade 1, 1.5 x 1.0 x 0.9 cm, focal LVI, margins negative but close (0.8 mm from posterior margin); DCIS, low-grade, 20% of the tumor measuring up to 1.8 x 0.4 cm in sections away from invasive carcinoma, " margins positive (posterior multifocal and medial unifocal; left axillary SLNBx negative (0/2), pT1c, (sn)pN0, pM-Not applicable   -6/14/2021-underwent reexcision due to close margins.  - Oncotype DX breast recurrence score report:  Recurrence score result: 11  Distant recurrence risk at 9 years with adjuvant endocrine therapy alone: 3%  Group average absolute chemotherapy benefit: <1%  -7/28/2021-8/24/2021-adjuvant radiation to the breast 5240 cGy in 20 treatments.  (Dr. Mayer)  -8/2021-initiated tamoxifen    -7/21 [facility] Common hereditary cancers panel, TRAVIS and CHEK2: Negative for mutations in 47 genes tested    Last imaging:  - 6/23 bilateral screening mammogram: Breast heterogeneously dense, conservation changes of left breast, otherwise FER BI-RADS 2, follow-up 1 year    PLAN:  - Continue tamoxifen, refilled today  - Plan for at least 5 years of adjuvant endocrine therapy (8/2026)  - Next mammogram due 6/2024-ordered today  - Check baseline CBC, CMP today  - Check CBC, CMP, fasting lipid panel, hemoglobin A1c next visit  - Continue annual gynecology visit and ophthalmology visit    #recurrent bacterial vaginosis  - currently on flagyl 2x/week, fluconazole 1x/week- plan for tx from 2/24-8/24.     RTC 6 months for follow-up with me, fasting labs prior to visit    Navya Jauregui DO  Hematology/Oncology  [facility]    Future Appointments   Date Time Provider Department Center   2/23/2024  3:15 PM [facility] [place]          Again, thank you for allowing me to participate in the care of your patient.        Sincerely,        NAVYA JAUREGUI DO

## 2024-02-23 NOTE — NURSING NOTE
"Oncology Rooming Note    February 23, 2024 3:24 PM   Antonella Jamison is a 43 year old female who presents for:    Chief Complaint   Patient presents with    Oncology Clinic Visit     Transfer of care     Initial Vitals: /74 (BP Location: Right arm)   Pulse 77   Resp 14   Ht 1.753 m (5' 9.02\")   Wt 61.7 kg (136 lb)   LMP 02/14/2024 (Within Days)   SpO2 99%   BMI 20.07 kg/m   Estimated body mass index is 20.07 kg/m  as calculated from the following:    Height as of this encounter: 1.753 m (5' 9.02\").    Weight as of this encounter: 61.7 kg (136 lb). Body surface area is 1.73 meters squared.  No Pain (0) Comment: Data Unavailable   Patient's last menstrual period was 02/14/2024 (within days).  Allergies reviewed: Yes  Medications reviewed: Yes    Medications: Medication refills not needed today.  Pharmacy name entered into EPIC: [id number] - [place] [id number]    Frailty Screening:   Is the patient here for a new oncology consult visit in cancer care? 2. No      Clinical concerns: tamoxifen side effects       Christine Spaulding LPN              "

## 2024-02-23 NOTE — PROGRESS NOTES
[facility]    Hematology/Oncology New Patient Note      Today's Date: February 23, 2024     Referring provider: Dr Sheri Rosa  Reason for Consultation: Transfer of care, breast cancer    HISTORY OF PRESENT ILLNESS: Antonella Jamison is a 43 year old female who was referred to the Hematology/Oncology Clinic for breast cancer    Patient has medical history including breast cancer, recurrent bacterial vaginosis     -5/2021 patient self palpated lump in left breast   -Patient's diagnostic imaging is not available for my review  -5/2021 ultrasound-guided left breast biopsy of 12:00 lesion, 4 cm from nipple  PATHOLOGY:  1. Invasive ductal carcinoma, measuring 0.7 cm in core biopsy      a. [place] Edge grade: I of III; [place] Edge score: 5 of 9      b. Angio-lymphatic invasion: Absent      c. Associated DCIS: Present      d. Subtype: Cribriform and solid      e. Grade of DCIS: 2 of 3   2. Breast Ancillary Testing:        a. Hormone Receptors:             Estrogen receptor: Positive (99%, strong staining)             Progesterone receptor: Positive (97%, moderate staining)       b. HER2 by IHC: Negative (1+ by manual morphometry)   - 6/9/2021-left breast lumpectomy and left sentinel lymph node biopsy.    PATHOLOGY:  A) LEFT BREAST, ORIENTED LUMPECTOMY:        1) INVASIVE DUCTAL CARCINOMA             a) Grade: Gumaro grade I (of III)             b) Size:  15 x 11 x 9 mm (measured and calculated in slides)   c) Margins: Uninvolved, but very close to, at 0.8 mm from the nearest   posterior margin, and 4 mm from anterior margin        2) DUCTAL CARCINOMA IN SITU: EIC Negative (SEE COMMENT)             a) Nuclear grade: Low to intermediate             b) Patterns: Cribriform and solid, with focal comedonecrosis             c) Size:  20% of the tumor, and measured up to 18 x 4 mm in   sections away from invasive carcinoma             d) Margins: Positive, at posterior (multifocal) and medial    (unifocal) margins        3) ADDITIONAL FINDINGS:             Background breast with focal secretory activities and duct   ectasia, no atypia             Focal lymph-vascular tumor involvement in block A10        4) Previous biopsy site present, with organizing changes and   foreign body giant cell reaction     B) LEFT SENTINEL LYMPH NODE, BIOPSY:        - TWO BENIGN LYMPH NODES (0/2) WITH NO EVIDENCE OF METASTATIC          CARCINOMA     PATHOLOGIC STAGE: pT1c, pN0 (sn), pM-Not applicable   -6/14/2021-underwent reexcision due to close margins (and hematoma evacuation)  - Oncotype DX breast recurrence score report:  Recurrence score result: 11  Distant recurrence risk at 9 years with adjuvant endocrine therapy alone: 3%  Group average absolute chemotherapy benefit: <1%  -7/28/2021-8/24/2021-adjuvant radiation to the breast 5240 cGy in 20 treatments.  (Dr. Mayer)  -8/2021-initiated tamoxifen    -7/21 [facility] Common hereditary cancers panel, TRAVIS and CHEK2: Negative for mutations in 47 genes tested    Lifetime estrogen exposure:  Menarche: 11  Last menstrual period: 2/24  Age of first pregnancy: 32   Number of pregnancies: 1  Weight gain: negative   Exposure to exogenous estrogen: OCPs x10 years+    Family history of:  1.  Breast cancer including male breast cancer: maternal aunt- dx in her mid 50s, stage 2, s/p mastectomy   2. Ovarian cancer: negative  3.  Pancreatic cancer: mother- pancreatic cancer- dx in early 50s, passed within 1.5 years  4.  Prostate cancer: negative  5. Diffuse gastric cancer (if lobular breast CA): negative  6. Uterine cancer: negative  7. Colon cancer: paternal GM- colon cancer in her 80s    Patient denies the following:  History of DVT/PE/VTE for self or family  Abnormal uterine bleeding, abnormal Pap smears  Cataracts  Severe arthralgias and myalgias  Cardiovascular risk factors including hypertension, hyperlipidemia, diabetes  Osteoporosis  Current use of antidepressants      Pt reports  recurrent bacterial vaginosis, currently on flagyl 2x/week, fluconazole 1x/week- plan for tx from 2/24-8/24.     LMP 2/12/24, periods are irregular (1 week early this month). Pt is on magnesium, she thinks it helps with mood changes that she relates to perimenopausal           REVIEW OF SYSTEMS:   A 14 point ROS was reviewed with pertinent positives and negatives in the HPI.        HOME MEDICATIONS:  Current Outpatient Medications   Medication Sig Dispense Refill    acetaminophen (TYLENOL) 325 MG tablet 2 tab(s)      Cyanocobalamin (RAPID B-12 ENERGY) 200 MCG/SPRAY LIQD Place 1 spray under the tongue      fish oil-omega-3 fatty acids 500 MG capsule       fluconazole (DIFLUCAN) 150 MG tablet       ibuprofen (ADVIL/MOTRIN) 200 MG tablet Take 200-400 mg by mouth      lactobacillus rhamnosus, GG, (CULTURELL) capsule Take 1 capsule by mouth 2 times daily      loratadine (CLARITIN) 10 MG tablet 1 tab(s)      magnesium 250 MG tablet Take 1 tablet by mouth daily      metroNIDAZOLE (FLAGYL) 500 MG tablet       Multiple Vitamin (MULTIVITAMIN ADULT PO) Take 1 tablet by mouth      tamoxifen (NOLVADEX) 20 MG tablet Take 1 tablet (20 mg) by mouth daily 90 tablet 3    Vitamin D, Cholecalciferol, 25 MCG (1000 UT) CAPS            ALLERGIES:  Allergies   Allergen Reactions    Cat Dander [Animal Dander] Unknown    Dog Dander [Dog Epithelium Allergy Skin Test] Unknown    Other Environmental Allergy Unknown    Seasonal Allergies      Other Reaction(s): Unknown         PAST MEDICAL HISTORY:  Past Medical History:   Diagnosis Date    Cancer (H)     Infectious viral hepatitis     Personal history of malaria          PAST SURGICAL HISTORY:  Past Surgical History:   Procedure Laterality Date    OTHER SURGICAL HISTORY      Hairy nevis    FL MASTECTOMY, PARTIAL Left 6/9/2021    Procedure: Left Lumpectomy (With MSC u/s): [place] Lymph Node Biopsy;  Surgeon: Sigrid Ramirez MD;  Location: [facility];  Service: General    FL MASTECTOMY,  "PARTIAL Left 2021    Procedure: Evacuation Hematoma, Re-excision Lumpectomy;  Surgeon: Sigrid Ramirez MD;  Location: [facility];  Service: General    US SENTINEL NODE INJECTION  2021    VAGINAL DELIVERY      WISDOM TOOTH EXTRACTION      ZZC NONSPECIFIC PROCEDURE      nevus removal         SOCIAL HISTORY:  Social History     Socioeconomic History    Marital status:      Spouse name: Not on file    Number of children: 0    Years of education: 16    Highest education level: Not on file   Occupational History    Occupation: [employer]   Tobacco Use    Smoking status: Former     Packs/day: 0.25     Years: 2.00     Additional pack years: 0.00     Total pack years: 0.50     Types: Cigarettes     Quit date: 2007     Years since quittin.1    Smokeless tobacco: Never   Substance and Sexual Activity    Alcohol use: Yes     Comment: Alcoholic Drinks/day: 5 X week    Drug use: Never    Sexual activity: Not Currently   Other Topics Concern    Parent/sibling w/ CABG, MI or angioplasty before 65F 55M? No   Social History Narrative    Not on file     Social Determinants of Health     Financial Resource Strain: Not on file   Food Insecurity: Not on file   Transportation Needs: Not on file   Physical Activity: Not on file   Stress: Not on file   Social Connections: Not on file   Interpersonal Safety: Not on file   Housing Stability: Not on file         FAMILY HISTORY:  Family History   Problem Relation Age of Onset    Pancreatic Cancer Mother     Breast Cancer Maternal Aunt     Melanoma Maternal Aunt     Colon Cancer Paternal Grandmother          PHYSICAL EXAM:  Vital signs:  /74 (BP Location: Right arm)   Pulse 77   Resp 14   Ht 1.753 m (5' 9.02\")   Wt 61.7 kg (136 lb)   LMP 2024 (Within Days)   SpO2 99%   BMI 20.07 kg/m         ECO  GENERAL/CONSTITUTIONAL: No acute distress.  EYES: Pupils are equal and round. Extraocular movements intact without nystagmus.  No scleral " icterus.  RESPIRATORY: Equal chest rise.   MUSCULOSKELETAL: Warm and well-perfused, no cyanosis  NEUROLOGIC: Cranial nerves are grossly intact. Alert, oriented to person, place and time, answers questions appropriately.  INTEGUMENTARY: No rashes or jaundice.  GAIT: Steady, does not use assistive device  BREAST: pt circled are of concern- right breast, 1030 oclock, lateral breast, pec muscle palpable at that area but no nodule or cyst palpable and overall feels symmetric to left breast seated and supine. No palpable LN in either axilla or clavicle area.        LABS:  CBC RESULTS:   Recent Labs   Lab Test 06/14/21  1019   WBC 4.1   RBC 4.36   HGB 13.4   HCT 39.5   MCV 91   MCH 30.7   MCHC 33.9   RDW 12.4          Recent Labs   Lab Test 11/30/21  1700 11/27/20  1642    139   POTASSIUM 4.1 3.9   CHLORIDE 105 105   CO2 26 23   ANIONGAP 10 11   GLC 89 101   BUN 14 21   CR 0.78 0.71   CHRISTIANA 9.1 8.9         PATHOLOGY:      IMAGING:      ASSESSMENT/PLAN:  Antonella Jamison is a 43 year old female with:      #  pT1c pN0 (sn) LEFT breast IDC, ER positive, LA positive, HER2 negative  -Patient's diagnostic imaging is not available for my review  -5/2021 ultrasound-guided left breast biopsy of 12:00 lesion, 4 cm from nipple  PATHOLOGY: IDC, 0.7 cm, grade 1; DCIS grade 2, ER positive (99%, strong), LA positive (97%, moderate), HER2 negative (1+ on IHC  - 6/9/2021-left breast lumpectomy and left sentinel lymph node biopsy.    PATHOLOGY: IDC, grade 1, 1.5 x 1.0 x 0.9 cm, focal LVI, margins negative but close (0.8 mm from posterior margin); DCIS, low-grade, 20% of the tumor measuring up to 1.8 x 0.4 cm in sections away from invasive carcinoma, margins positive (posterior multifocal and medial unifocal; left axillary SLNBx negative (0/2), pT1c, (sn)pN0, pM-Not applicable   -6/14/2021-underwent reexcision due to close margins.  - Oncotype DX breast recurrence score report:  Recurrence score result: 11  Distant recurrence risk  at 9 years with adjuvant endocrine therapy alone: 3%  Group average absolute chemotherapy benefit: <1%  -7/28/2021-8/24/2021-adjuvant radiation to the breast 5240 cGy in 20 treatments.  (Dr. Mayer)  -8/2021-initiated tamoxifen    -7/21 [facility] Common hereditary cancers panel, TRAVIS and CHEK2: Negative for mutations in 47 genes tested    Last imaging:  - 6/23 bilateral screening mammogram: Breast heterogeneously dense, conservation changes of left breast, otherwise FER BI-RADS 2, follow-up 1 year    PLAN:  - Continue tamoxifen, refilled today  - Plan for at least 5 years of adjuvant endocrine therapy (8/2026)  - Next mammogram due 6/2024-ordered today  - Check baseline CBC, CMP today  - Check CBC, CMP, fasting lipid panel, hemoglobin A1c next visit  - Continue annual gynecology visit and ophthalmology visit    #recurrent bacterial vaginosis  - currently on flagyl 2x/week, fluconazole 1x/week- plan for tx from 2/24-8/24.     RTC 6 months for follow-up with me, fasting labs prior to visit    Barby Noel DO  Hematology/Oncology  [facility]    Future Appointments   Date Time Provider Department Center   2/23/2024  3:15 PM Barby Noel DO [facility]

## 2024-04-08 ENCOUNTER — MYC MEDICAL ADVICE (OUTPATIENT)
Dept: ONCOLOGY | Facility: CLINIC | Age: 44
End: 2024-04-08
Payer: COMMERCIAL

## 2024-04-08 NOTE — TELEPHONE ENCOUNTER
Patient called triage to discuss sx. States for the last several days she has noticed tenderness with palpation under both armpits. No swelling, lumps or bumps. No fever, redness, chills, and area is not hot to touch. Pain is at 3/10 and only felt upon palpation.  No other sx noted. No injury or strenuous activity. She's not wearing tight fitting bras.     Advised patient to apply warm compresses to the area and monitor for any new or worsening sx. She is to call us if she develops any new or worsening sx.       Glory Dutta, RN, BSN, PHN, OCN  M.[facility]

## 2024-04-09 NOTE — TELEPHONE ENCOUNTER
Called patient and discussed message below, understanding verbalized. She will follow up as indicated.     Glory Dutta RN, BSN, PHN, OCN  M.[facility]

## 2024-06-18 ENCOUNTER — MYC MEDICAL ADVICE (OUTPATIENT)
Dept: ONCOLOGY | Facility: CLINIC | Age: 44
End: 2024-06-18
Payer: COMMERCIAL

## 2024-06-18 DIAGNOSIS — Z17.0 MALIGNANT NEOPLASM OF CENTRAL PORTION OF LEFT BREAST IN FEMALE, ESTROGEN RECEPTOR POSITIVE (H): Primary | ICD-10-CM

## 2024-06-18 DIAGNOSIS — N64.4 BREAST PAIN: ICD-10-CM

## 2024-06-18 DIAGNOSIS — C50.112 MALIGNANT NEOPLASM OF CENTRAL PORTION OF LEFT BREAST IN FEMALE, ESTROGEN RECEPTOR POSITIVE (H): Primary | ICD-10-CM

## 2024-06-20 NOTE — TELEPHONE ENCOUNTER
Pt sent the following [employer] message:    Antonella OCONNOR Yaquelin HANSON [facility] Rn (supporting Barby DO Bert)2 days ago     NAREN Noel,     Since June 13 I have noticed the sides of my breast are sore and especially right below my right nipple. I m not on my period or PMSing. No accidents and no straining.   Hmmmm just wanted to let you know because this pain/tenderness is new to me.      Thank you!        Oncology Nurse Triage    Situation:   Antonella reporting the following symptoms: soreness of lateral aspects of both breasts and below nipple on right breast     Background:   Treating Provider:   Dr Noel     Date of last office visit: 2/23/2024    Recent Treatments: tamoxifen for history of left breast cancer     Assessment:     Writer called pt to further discuss her symptoms.  Pt states that symptoms started on 6/12/2024. She has noticed some soreness/tenderness on the lateral aspects of both breasts, and also some soreness below the nipple on  her right breast. Soreness on right breast is worse than soreness on left breast.   Symptoms are not worsening over the past week. Overall pt states that they are the same to slightly improved.   Denies any redness, warmth, or swelling. No new lumps or bumps. No fever. Pt denies any skin changes or thickening. No nipple discharge or changes.   Pt states that soreness is only present when she pushes on it or lays on her stomach. Rates pain at 4-5/10 when the area is being pressed. At all  other times, pt denies any pain. She has not had to take any pain medications for the soreness.  Her first day of last menstrual period was 6/4/2024.    Recommendations:     Will route to Dr Noel for review and recommendations.  Reviewed emergent symptoms with pt that would warrant return call to clinic or evaluation right away.    Pt is scheduled for screening mammogram on 6/24/2024. Will also check with Dr Noel to see if pt should have diagnostic mammogram done  instead.    Patient verbalized understanding and agreement with plan.  Patient was instructed to call the clinic with any questions, concerns, or worsening symptoms.    Maggie Melton RN on 6/20/2024 at 3:27 PM

## 2024-06-21 NOTE — TELEPHONE ENCOUNTER
Barby Noel, DO  You; Marilin Cole, RN1 hour ago (6:50 AM)       Order placed for diagnostic mammo and added US order in case that is needed     Attempted to reach pt, but she did not answer.  Left general message for pt to let her know that [employer] message will be sent with recommendations from Dr Noel.     Maggie Melton, RN on 6/21/2024 at 8:39 AM

## 2024-06-24 ENCOUNTER — ANCILLARY PROCEDURE (OUTPATIENT)
Dept: MAMMOGRAPHY | Facility: CLINIC | Age: 44
End: 2024-06-24
Attending: INTERNAL MEDICINE
Payer: COMMERCIAL

## 2024-06-24 DIAGNOSIS — C50.112 MALIGNANT NEOPLASM OF CENTRAL PORTION OF LEFT BREAST IN FEMALE, ESTROGEN RECEPTOR POSITIVE (H): ICD-10-CM

## 2024-06-24 DIAGNOSIS — N64.4 BREAST PAIN: ICD-10-CM

## 2024-06-24 DIAGNOSIS — Z17.0 MALIGNANT NEOPLASM OF CENTRAL PORTION OF LEFT BREAST IN FEMALE, ESTROGEN RECEPTOR POSITIVE (H): ICD-10-CM

## 2024-06-24 PROCEDURE — 77062 BREAST TOMOSYNTHESIS BI: CPT

## 2024-06-24 PROCEDURE — 76642 ULTRASOUND BREAST LIMITED: CPT | Mod: 50

## 2024-07-20 ENCOUNTER — HEALTH MAINTENANCE LETTER (OUTPATIENT)
Age: 44
End: 2024-07-20

## 2024-08-29 ENCOUNTER — ONCOLOGY VISIT (OUTPATIENT)
Dept: ONCOLOGY | Facility: CLINIC | Age: 44
End: 2024-08-29
Attending: INTERNAL MEDICINE
Payer: COMMERCIAL

## 2024-08-29 ENCOUNTER — LAB (OUTPATIENT)
Dept: INFUSION THERAPY | Facility: CLINIC | Age: 44
End: 2024-08-29
Attending: INTERNAL MEDICINE
Payer: COMMERCIAL

## 2024-08-29 VITALS
DIASTOLIC BLOOD PRESSURE: 75 MMHG | BODY MASS INDEX: 20.73 KG/M2 | RESPIRATION RATE: 16 BRPM | HEIGHT: 69 IN | HEART RATE: 69 BPM | SYSTOLIC BLOOD PRESSURE: 119 MMHG | WEIGHT: 140 LBS | OXYGEN SATURATION: 100 %

## 2024-08-29 DIAGNOSIS — Z17.0 MALIGNANT NEOPLASM OF CENTRAL PORTION OF LEFT BREAST IN FEMALE, ESTROGEN RECEPTOR POSITIVE (H): ICD-10-CM

## 2024-08-29 DIAGNOSIS — C50.112 MALIGNANT NEOPLASM OF CENTRAL PORTION OF LEFT BREAST IN FEMALE, ESTROGEN RECEPTOR POSITIVE (H): Primary | ICD-10-CM

## 2024-08-29 DIAGNOSIS — C50.112 MALIGNANT NEOPLASM OF CENTRAL PORTION OF LEFT BREAST IN FEMALE, ESTROGEN RECEPTOR POSITIVE (H): ICD-10-CM

## 2024-08-29 DIAGNOSIS — D72.819 LEUKOPENIA, UNSPECIFIED TYPE: ICD-10-CM

## 2024-08-29 DIAGNOSIS — Z17.0 MALIGNANT NEOPLASM OF CENTRAL PORTION OF LEFT BREAST IN FEMALE, ESTROGEN RECEPTOR POSITIVE (H): Primary | ICD-10-CM

## 2024-08-29 DIAGNOSIS — Z79.810 LONG-TERM CURRENT USE OF TAMOXIFEN: ICD-10-CM

## 2024-08-29 LAB
ALBUMIN SERPL BCG-MCNC: 4.2 G/DL (ref 3.5–5.2)
ALP SERPL-CCNC: 32 U/L (ref 40–150)
ALT SERPL W P-5'-P-CCNC: 21 U/L (ref 0–50)
ANION GAP SERPL CALCULATED.3IONS-SCNC: 8 MMOL/L (ref 7–15)
AST SERPL W P-5'-P-CCNC: 32 U/L (ref 0–45)
BASOPHILS # BLD AUTO: 0 10E3/UL (ref 0–0.2)
BASOPHILS NFR BLD AUTO: 1 %
BILIRUB SERPL-MCNC: 0.5 MG/DL
BUN SERPL-MCNC: 16.3 MG/DL (ref 6–20)
CALCIUM SERPL-MCNC: 9.1 MG/DL (ref 8.8–10.4)
CHLORIDE SERPL-SCNC: 106 MMOL/L (ref 98–107)
CHOLEST SERPL-MCNC: 179 MG/DL
CREAT SERPL-MCNC: 0.82 MG/DL (ref 0.51–0.95)
EGFRCR SERPLBLD CKD-EPI 2021: 90 ML/MIN/1.73M2
EOSINOPHIL # BLD AUTO: 0.2 10E3/UL (ref 0–0.7)
EOSINOPHIL NFR BLD AUTO: 5 %
ERYTHROCYTE [DISTWIDTH] IN BLOOD BY AUTOMATED COUNT: 12.5 % (ref 10–15)
FASTING STATUS PATIENT QL REPORTED: YES
FASTING STATUS PATIENT QL REPORTED: YES
GLUCOSE SERPL-MCNC: 100 MG/DL (ref 70–99)
HBA1C MFR BLD: 5.5 % (ref 0–5.6)
HCO3 SERPL-SCNC: 26 MMOL/L (ref 22–29)
HCT VFR BLD AUTO: 39.7 % (ref 35–47)
HDLC SERPL-MCNC: 63 MG/DL
HGB BLD-MCNC: 13.1 G/DL (ref 11.7–15.7)
HOLD SPECIMEN: NORMAL
IMM GRANULOCYTES # BLD: 0 10E3/UL
IMM GRANULOCYTES NFR BLD: 0 %
LDLC SERPL CALC-MCNC: 100 MG/DL
LYMPHOCYTES # BLD AUTO: 1.1 10E3/UL (ref 0.8–5.3)
LYMPHOCYTES NFR BLD AUTO: 29 %
MCH RBC QN AUTO: 31.1 PG (ref 26.5–33)
MCHC RBC AUTO-ENTMCNC: 33 G/DL (ref 31.5–36.5)
MCV RBC AUTO: 94 FL (ref 78–100)
MONOCYTES # BLD AUTO: 0.3 10E3/UL (ref 0–1.3)
MONOCYTES NFR BLD AUTO: 9 %
NEUTROPHILS # BLD AUTO: 2.1 10E3/UL (ref 1.6–8.3)
NEUTROPHILS NFR BLD AUTO: 57 %
NONHDLC SERPL-MCNC: 116 MG/DL
NRBC # BLD AUTO: 0 10E3/UL
NRBC BLD AUTO-RTO: 0 /100
PLATELET # BLD AUTO: 229 10E3/UL (ref 150–450)
POTASSIUM SERPL-SCNC: 3.8 MMOL/L (ref 3.4–5.3)
PROT SERPL-MCNC: 7.1 G/DL (ref 6.4–8.3)
RBC # BLD AUTO: 4.21 10E6/UL (ref 3.8–5.2)
SODIUM SERPL-SCNC: 140 MMOL/L (ref 135–145)
TRIGL SERPL-MCNC: 79 MG/DL
WBC # BLD AUTO: 3.7 10E3/UL (ref 4–11)

## 2024-08-29 PROCEDURE — 82040 ASSAY OF SERUM ALBUMIN: CPT

## 2024-08-29 PROCEDURE — 99214 OFFICE O/P EST MOD 30 MIN: CPT | Performed by: INTERNAL MEDICINE

## 2024-08-29 PROCEDURE — 84155 ASSAY OF PROTEIN SERUM: CPT

## 2024-08-29 PROCEDURE — 36415 COLL VENOUS BLD VENIPUNCTURE: CPT | Performed by: INTERNAL MEDICINE

## 2024-08-29 PROCEDURE — 85004 AUTOMATED DIFF WBC COUNT: CPT

## 2024-08-29 PROCEDURE — 83036 HEMOGLOBIN GLYCOSYLATED A1C: CPT

## 2024-08-29 PROCEDURE — 84478 ASSAY OF TRIGLYCERIDES: CPT

## 2024-08-29 RX ORDER — TAMOXIFEN CITRATE 20 MG/1
20 TABLET ORAL DAILY
Qty: 90 TABLET | Refills: 3 | Status: SHIPPED | OUTPATIENT
Start: 2024-08-29

## 2024-08-29 ASSESSMENT — PAIN SCALES - GENERAL: PAINLEVEL: NO PAIN (0)

## 2024-08-29 NOTE — LETTER
8/29/2024      Antonella Jamison  [address] [address]      Dear Colleague,    Thank you for referring your patient, Antonella Jamison, to the [facility]. Please see a copy of my visit note below.    [facility]    Hematology/Oncology Established Patient Follow-Up Note    Today's Date: 8/29/2024    Referring provider: Dr Sheri Rosa  Reason for Consultation: Transfer of care, breast cancer    HISTORY OF PRESENT ILLNESS: Antonella Jamison is a 43 year old female who was referred to the Hematology/Oncology Clinic for breast cancer    Patient has medical history including breast cancer, recurrent bacterial vaginosis     -5/2021 patient self palpated lump in left breast   -Patient's diagnostic imaging is not available for my review  -5/2021 ultrasound-guided left breast biopsy of 12:00 lesion, 4 cm from nipple  PATHOLOGY:  1. Invasive ductal carcinoma, measuring 0.7 cm in core biopsy      a. Gumaro grade: I of III; [place] score: 5 of 9      b. Angio-lymphatic invasion: Absent      c. Associated DCIS: Present      d. Subtype: Cribriform and solid      e. Grade of DCIS: 2 of 3   2. Breast Ancillary Testing:        a. Hormone Receptors:             Estrogen receptor: Positive (99%, strong staining)             Progesterone receptor: Positive (97%, moderate staining)       b. HER2 by IHC: Negative (1+ by manual morphometry)   - 6/9/2021-left breast lumpectomy and left sentinel lymph node biopsy.    PATHOLOGY:  A) LEFT BREAST, ORIENTED LUMPECTOMY:        1) INVASIVE DUCTAL CARCINOMA             a) Grade: Gumaro grade I (of III)             b) Size:  15 x 11 x 9 mm (measured and calculated in slides)   c) Margins: Uninvolved, but very close to, at 0.8 mm from the nearest   posterior margin, and 4 mm from anterior margin        2) DUCTAL CARCINOMA IN SITU: EIC Negative (SEE COMMENT)             a) Nuclear grade: Low to intermediate              b) Patterns: Cribriform and solid, with focal comedonecrosis             c) Size:  20% of the tumor, and measured up to 18 x 4 mm in   sections away from invasive carcinoma             d) Margins: Positive, at posterior (multifocal) and medial   (unifocal) margins        3) ADDITIONAL FINDINGS:             Background breast with focal secretory activities and duct   ectasia, no atypia             Focal lymph-vascular tumor involvement in block A10        4) Previous biopsy site present, with organizing changes and   foreign body giant cell reaction     B) LEFT SENTINEL LYMPH NODE, BIOPSY:        - TWO BENIGN LYMPH NODES (0/2) WITH NO EVIDENCE OF METASTATIC          CARCINOMA     PATHOLOGIC STAGE: pT1c, pN0 (sn), pM-Not applicable   -6/14/2021-underwent reexcision due to close margins (and hematoma evacuation)  - Oncotype DX breast recurrence score report:  Recurrence score result: 11  Distant recurrence risk at 9 years with adjuvant endocrine therapy alone: 3%  Group average absolute chemotherapy benefit: <1%  -7/28/2021-8/24/2021-adjuvant radiation to the breast 5240 cGy in 20 treatments.  (Dr. Mayer)  -8/2021-initiated tamoxifen    -7/21 [facility] Common hereditary cancers panel, TRAVIS and CHEK2: Negative for mutations in 47 genes tested    Lifetime estrogen exposure:  Menarche: 11  Last menstrual period: 2/24  Age of first pregnancy: 32   Number of pregnancies: 1  Weight gain: negative   Exposure to exogenous estrogen: OCPs x10 years+    Family history of:  1.  Breast cancer including male breast cancer: maternal aunt- dx in her mid 50s, stage 2, s/p mastectomy   2. Ovarian cancer: negative  3.  Pancreatic cancer: mother- pancreatic cancer- dx in early 50s, passed within 1.5 years  4.  Prostate cancer: negative  5. Diffuse gastric cancer (if lobular breast CA): negative  6. Uterine cancer: negative  7. Colon cancer: paternal GM- colon cancer in her 80s    Patient denies the following:  History of DVT/PE/VTE for self  or family  Abnormal uterine bleeding, abnormal Pap smears  Cataracts  Severe arthralgias and myalgias  Cardiovascular risk factors including hypertension, hyperlipidemia, diabetes  Osteoporosis  Current use of antidepressants      Pt reports recurrent bacterial vaginosis, currently on flagyl 2x/week, fluconazole 1x/week- plan for tx from 2/24-8/24.     LMP 2/12/24, periods are irregular (1 week early this month). Pt is on magnesium, she thinks it helps with mood changes that she relates to perimenopausal state      INTERIM HISTORY:  Pt continues on tamoxifen, rare missed doses   No breast pain currently, thinks previously it was related to her menstrual cycle  LMP 8/22/24, prior to that was 7/23/24, still getting periods around monthly, last varying amount of days  Follows with gyn regularly for BV- completing fluconazole and flagyl today  No s/s of DVT/PE  Had annual eye exam 7/24- no cataracts    REVIEW OF SYSTEMS:   A 14 point ROS was reviewed with pertinent positives and negatives in the HPI.        HOME MEDICATIONS:  Current Outpatient Medications   Medication Sig Dispense Refill     acetaminophen (TYLENOL) 325 MG tablet 2 tab(s)       Cyanocobalamin (RAPID B-12 ENERGY) 200 MCG/SPRAY LIQD Place 1 spray under the tongue       fish oil-omega-3 fatty acids 500 MG capsule        fluconazole (DIFLUCAN) 150 MG tablet        ibuprofen (ADVIL/MOTRIN) 200 MG tablet Take 200-400 mg by mouth       lactobacillus rhamnosus, GG, (CULTURELL) capsule Take 1 capsule by mouth 2 times daily       loratadine (CLARITIN) 10 MG tablet 1 tab(s)       magnesium 250 MG tablet Take 1 tablet by mouth daily       metroNIDAZOLE (FLAGYL) 500 MG tablet        Multiple Vitamin (MULTIVITAMIN ADULT PO) Take 1 tablet by mouth       tamoxifen (NOLVADEX) 20 MG tablet Take 1 tablet (20 mg) by mouth daily 90 tablet 3     Vitamin D, Cholecalciferol, 25 MCG (1000 UT) CAPS            ALLERGIES:  Allergies   Allergen Reactions     Cat Dander [Animal  Dander] Unknown     Dog Dander [Dog Epithelium (Canis Lupus Familiaris)] Unknown     Other Environmental Allergy Unknown     Seasonal Allergies      Other Reaction(s): Unknown         PAST MEDICAL HISTORY:  Past Medical History:   Diagnosis Date     Cancer (H)      Infectious viral hepatitis      Personal history of malaria          PAST SURGICAL HISTORY:  Past Surgical History:   Procedure Laterality Date     OTHER SURGICAL HISTORY      Hairy nevis     NJ MASTECTOMY, PARTIAL Left 2021    Procedure: Left Lumpectomy (With MSC u/s): [place] Lymph Node Biopsy;  Surgeon: Sigrid Ramirez MD;  Location: [facility];  Service: General     NJ MASTECTOMY, PARTIAL Left 2021    Procedure: Evacuation Hematoma, Re-excision Lumpectomy;  Surgeon: Sigrid Ramirez MD;  Location: [facility];  Service: General     US SENTINEL NODE INJECTION  2021     VAGINAL DELIVERY       WISDOM TOOTH EXTRACTION       ZZC NONSPECIFIC PROCEDURE      nevus removal         SOCIAL HISTORY:  Social History     Socioeconomic History     Marital status:      Spouse name: Not on file     Number of children: 0     Years of education: 16     Highest education level: Not on file   Occupational History     Occupation: [employer]   Tobacco Use     Smoking status: Former     Current packs/day: 0.00     Average packs/day: 0.3 packs/day for 2.0 years (0.5 ttl pk-yrs)     Types: Cigarettes     Start date: 2005     Quit date: 2007     Years since quittin.6     Smokeless tobacco: Never   Substance and Sexual Activity     Alcohol use: Yes     Comment: Alcoholic Drinks/day: 5 X week     Drug use: Never     Sexual activity: Not Currently   Other Topics Concern     Parent/sibling w/ CABG, MI or angioplasty before 65F 55M? No   Social History Narrative     Not on file     Social Determinants of Health     Financial Resource Strain: Not on file   Food Insecurity: Not on file   Transportation Needs: Not on file   Physical  "Activity: Not on file   Stress: Not on file   Social Connections: Not on file   Interpersonal Safety: Not on file   Housing Stability: Not on file         FAMILY HISTORY:  Family History   Problem Relation Age of Onset     Pancreatic Cancer Mother      Breast Cancer Maternal Aunt      Melanoma Maternal Aunt      Colon Cancer Paternal Grandmother          PHYSICAL EXAM:  Vital signs:  /75 (BP Location: Right arm)   Pulse 69   Resp 16   Ht 1.753 m (5' 9.02\")   Wt 63.5 kg (140 lb)   LMP 2024 (Exact Date)   SpO2 100%   BMI 20.66 kg/m         ECO  GENERAL/CONSTITUTIONAL: No acute distress.  EYES: Pupils are equal and round. Extraocular movements intact without nystagmus.  No scleral icterus.  RESPIRATORY: Equal chest rise.   MUSCULOSKELETAL: Warm and well-perfused, no cyanosis  NEUROLOGIC: Cranial nerves are grossly intact. Alert, oriented to person, place and time, answers questions appropriately.  INTEGUMENTARY: No rashes or jaundice.  GAIT: Steady, does not use assistive device  BREAST: Right-no palpable mass, discharge, rash, or axillary lymphadenopathy.  Left-no palpable mass, discharge, rash, or axillary lymphadenopathy. Scar above aroela noted          LABS:   Latest Reference Range & Units 24 08:21   Sodium 135 - 145 mmol/L 140   Potassium 3.4 - 5.3 mmol/L 3.8   Chloride 98 - 107 mmol/L 106   Carbon Dioxide (CO2) 22 - 29 mmol/L 26   Urea Nitrogen 6.0 - 20.0 mg/dL 16.3   Creatinine 0.51 - 0.95 mg/dL 0.82   GFR Estimate >60 mL/min/1.73m2 90   Calcium 8.8 - 10.4 mg/dL 9.1   Anion Gap 7 - 15 mmol/L 8   Albumin 3.5 - 5.2 g/dL 4.2   Protein Total 6.4 - 8.3 g/dL 7.1   Alkaline Phosphatase 40 - 150 U/L 32 (L)   ALT 0 - 50 U/L 21   AST 0 - 45 U/L 32   Bilirubin Total <=1.2 mg/dL 0.5   Cholesterol <200 mg/dL 179   Patient Fasting?  Yes  Yes   Glucose 70 - 99 mg/dL 100 (H)   HDL Cholesterol >=50 mg/dL 63   Hemoglobin A1C 0.0 - 5.6 % 5.5   LDL Cholesterol Calculated <=100 mg/dL 100   Non HDL " Cholesterol <130 mg/dL 116   Triglycerides <150 mg/dL 79   WBC 4.0 - 11.0 10e3/uL 3.7 (L)   Hemoglobin 11.7 - 15.7 g/dL 13.1   Hematocrit 35.0 - 47.0 % 39.7   Platelet Count 150 - 450 10e3/uL 229   RBC Count 3.80 - 5.20 10e6/uL 4.21   MCV 78 - 100 fL 94   MCH 26.5 - 33.0 pg 31.1   MCHC 31.5 - 36.5 g/dL 33.0   RDW 10.0 - 15.0 % 12.5   % Neutrophils % 57   % Lymphocytes % 29   % Monocytes % 9   % Eosinophils % 5   % Basophils % 1   Absolute Basophils 0.0 - 0.2 10e3/uL 0.0   Absolute Eosinophils 0.0 - 0.7 10e3/uL 0.2   Absolute Immature Granulocytes <=0.4 10e3/uL 0.0   Absolute Lymphocytes 0.8 - 5.3 10e3/uL 1.1   Absolute Monocytes 0.0 - 1.3 10e3/uL 0.3   % Immature Granulocytes % 0   Absolute Neutrophils 1.6 - 8.3 10e3/uL 2.1   Absolute NRBCs 10e3/uL 0.0   NRBCs per 100 WBC <1 /100 0   (L): Data is abnormally low  (H): Data is abnormally high  PATHOLOGY:      IMAGING:  Narrative & Impression   MA DIAGNOSTIC BILATERAL W/ ANTOINE, US BREAST BILATERAL LIMITED 1-3  QUADRANTS -  6/24/2024 11:10 AM     HISTORY:  Bilateral breast pain at the lateral aspect of both breasts,  improved on the left. Personal history of left breast malignancy  status post breast conservation treatment in 2021.     COMPARISON:  Mammograms dated 6/2/2023, 2/6/2023, 6/1/2022, 5/18/2021     MAMMOGRAPHIC FINDINGS:  Bilateral digital diagnostic mammograms  performed. The breast tissue is heterogeneously dense which may  obscure small masses. There are breast conservation treatment changes  on the left. No mammographic evidence for malignancy in either breast.  Breast tomosynthesis and computer-aided detection used in  interpretation.     ULTRASOUND FINDINGS: Bilateral targeted ultrasound was performed and  areas of pain as directed by the patient at the lateral aspect of both  breasts. Normal tissue is visualized. No masses or suspicious  abnormality. No evidence for malignancy.                                                                       IMPRESSION:  No evidence for malignancy.     BI-RADS CATEGORY: 2 - Benign.     Results given to the patient who can resume routine screening  mammography Given the lack imaging findings further management of pain  should be based on clinical grounds.     ALEXIS HENRY MD           ASSESSMENT/PLAN:  Antonella Jamison is a 43 year old female with:      #  pT1c pN0 (sn) LEFT breast IDC, ER positive, MI positive, HER2 negative  -Patient's diagnostic imaging is not available for my review  -5/2021 ultrasound-guided left breast biopsy of 12:00 lesion, 4 cm from nipple  PATHOLOGY: IDC, 0.7 cm, grade 1; DCIS grade 2, ER positive (99%, strong), MI positive (97%, moderate), HER2 negative (1+ on IHC  - 6/9/2021-left breast lumpectomy and left sentinel lymph node biopsy.    PATHOLOGY: IDC, grade 1, 1.5 x 1.0 x 0.9 cm, focal LVI, margins negative but close (0.8 mm from posterior margin); DCIS, low-grade, 20% of the tumor measuring up to 1.8 x 0.4 cm in sections away from invasive carcinoma, margins positive (posterior multifocal and medial unifocal; left axillary SLNBx negative (0/2), pT1c, (sn)pN0, pM-Not applicable   -6/14/2021-underwent reexcision due to close margins.  - Oncotype DX breast recurrence score report:  Recurrence score result: 11  Distant recurrence risk at 9 years with adjuvant endocrine therapy alone: 3%  Group average absolute chemotherapy benefit: <1%  -7/28/2021-8/24/2021-adjuvant radiation to the breast 5240 cGy in 20 treatments.  (Dr. Mayer)  -8/2021-initiated tamoxifen    -7/21 [facility] Common hereditary cancers panel, TRAVIS and CHEK2: Negative for mutations in 47 genes tested    Last imaging:  - 6/24 bilateral diagnostic mammogram:The breast tissue is heterogeneously dense which may obscure small masses. There are breast conservation treatment changes on the left. No mammographic evidence for malignancy in either breast.  - 6/24 bilateral breast targeted ultrasound: Visualization of areas of pain  directed by patient at lateral aspects of both breasts, FER  Overall BI-RADS 2, follow-up 1 year    Last labs:  - 8/24 CBC WBC 3.7, normal differential, CMP WNL, fasting lipid panel WNL, hemoglobin A1c 5.5    PLAN:  - Continue tamoxifen, refilled today  - Plan for at least 5 years of adjuvant endocrine therapy (8/2026)  - Next mammogram due 6/2025-to be ordered later  - Check CBC, CMP in 6 months-ordered today  - Check fasting lipid panel, hemoglobin A1c 8/2025-to be ordered later  - Continue annual gynecology visit and ophthalmology visit  - Monitor for signs and symptoms of DVT/PE    #recurrent bacterial vaginosis  - completed flagyl and fluconazole 2/24-8/24.     RTC 6 months for follow-up with [facility], labs prior to visit    Navya Jauregui DO  Hematology/Oncology  [facility]      Again, thank you for allowing me to participate in the care of your patient.        Sincerely,        NAVYA JAUREGUI DO

## 2024-08-29 NOTE — PROGRESS NOTES
[facility]    Hematology/Oncology Established Patient Follow-Up Note    Today's Date: 8/29/2024    Referring provider: Dr Sheri Rosa  Reason for Consultation: Transfer of care, breast cancer    HISTORY OF PRESENT ILLNESS: Antonella Jamison is a 43 year old female who was referred to the Hematology/Oncology Clinic for breast cancer    Patient has medical history including breast cancer, recurrent bacterial vaginosis     -5/2021 patient self palpated lump in left breast   -Patient's diagnostic imaging is not available for my review  -5/2021 ultrasound-guided left breast biopsy of 12:00 lesion, 4 cm from nipple  PATHOLOGY:  1. Invasive ductal carcinoma, measuring 0.7 cm in core biopsy      a. Gumaro grade: I of III; [place] score: 5 of 9      b. Angio-lymphatic invasion: Absent      c. Associated DCIS: Present      d. Subtype: Cribriform and solid      e. Grade of DCIS: 2 of 3   2. Breast Ancillary Testing:        a. Hormone Receptors:             Estrogen receptor: Positive (99%, strong staining)             Progesterone receptor: Positive (97%, moderate staining)       b. HER2 by IHC: Negative (1+ by manual morphometry)   - 6/9/2021-left breast lumpectomy and left sentinel lymph node biopsy.    PATHOLOGY:  A) LEFT BREAST, ORIENTED LUMPECTOMY:        1) INVASIVE DUCTAL CARCINOMA             a) Grade: [place] grade I (of III)             b) Size:  15 x 11 x 9 mm (measured and calculated in slides)   c) Margins: Uninvolved, but very close to, at 0.8 mm from the nearest   posterior margin, and 4 mm from anterior margin        2) DUCTAL CARCINOMA IN SITU: EIC Negative (SEE COMMENT)             a) Nuclear grade: Low to intermediate             b) Patterns: Cribriform and solid, with focal comedonecrosis             c) Size:  20% of the tumor, and measured up to 18 x 4 mm in   sections away from invasive carcinoma             d) Margins: Positive, at posterior (multifocal) and medial    (unifocal) margins        3) ADDITIONAL FINDINGS:             Background breast with focal secretory activities and duct   ectasia, no atypia             Focal lymph-vascular tumor involvement in block A10        4) Previous biopsy site present, with organizing changes and   foreign body giant cell reaction     B) LEFT SENTINEL LYMPH NODE, BIOPSY:        - TWO BENIGN LYMPH NODES (0/2) WITH NO EVIDENCE OF METASTATIC          CARCINOMA     PATHOLOGIC STAGE: pT1c, pN0 (sn), pM-Not applicable   -6/14/2021-underwent reexcision due to close margins (and hematoma evacuation)  - Oncotype DX breast recurrence score report:  Recurrence score result: 11  Distant recurrence risk at 9 years with adjuvant endocrine therapy alone: 3%  Group average absolute chemotherapy benefit: <1%  -7/28/2021-8/24/2021-adjuvant radiation to the breast 5240 cGy in 20 treatments.  (Dr. Mayer)  -8/2021-initiated tamoxifen    -7/21 [facility] Common hereditary cancers panel, TRAVIS and CHEK2: Negative for mutations in 47 genes tested    Lifetime estrogen exposure:  Menarche: 11  Last menstrual period: 2/24  Age of first pregnancy: 32   Number of pregnancies: 1  Weight gain: negative   Exposure to exogenous estrogen: OCPs x10 years+    Family history of:  1.  Breast cancer including male breast cancer: maternal aunt- dx in her mid 50s, stage 2, s/p mastectomy   2. Ovarian cancer: negative  3.  Pancreatic cancer: mother- pancreatic cancer- dx in early 50s, passed within 1.5 years  4.  Prostate cancer: negative  5. Diffuse gastric cancer (if lobular breast CA): negative  6. Uterine cancer: negative  7. Colon cancer: paternal GM- colon cancer in her 80s    Patient denies the following:  History of DVT/PE/VTE for self or family  Abnormal uterine bleeding, abnormal Pap smears  Cataracts  Severe arthralgias and myalgias  Cardiovascular risk factors including hypertension, hyperlipidemia, diabetes  Osteoporosis  Current use of antidepressants      Pt reports  recurrent bacterial vaginosis, currently on flagyl 2x/week, fluconazole 1x/week- plan for tx from 2/24-8/24.     LMP 2/12/24, periods are irregular (1 week early this month). Pt is on magnesium, she thinks it helps with mood changes that she relates to perimenopausal state      INTERIM HISTORY:  Pt continues on tamoxifen, rare missed doses   No breast pain currently, thinks previously it was related to her menstrual cycle  LMP 8/22/24, prior to that was 7/23/24, still getting periods around monthly, last varying amount of days  Follows with gyn regularly for BV- completing fluconazole and flagyl today  No s/s of DVT/PE  Had annual eye exam 7/24- no cataracts    REVIEW OF SYSTEMS:   A 14 point ROS was reviewed with pertinent positives and negatives in the HPI.        HOME MEDICATIONS:  Current Outpatient Medications   Medication Sig Dispense Refill    acetaminophen (TYLENOL) 325 MG tablet 2 tab(s)      Cyanocobalamin (RAPID B-12 ENERGY) 200 MCG/SPRAY LIQD Place 1 spray under the tongue      fish oil-omega-3 fatty acids 500 MG capsule       fluconazole (DIFLUCAN) 150 MG tablet       ibuprofen (ADVIL/MOTRIN) 200 MG tablet Take 200-400 mg by mouth      lactobacillus rhamnosus, GG, (CULTURELL) capsule Take 1 capsule by mouth 2 times daily      loratadine (CLARITIN) 10 MG tablet 1 tab(s)      magnesium 250 MG tablet Take 1 tablet by mouth daily      metroNIDAZOLE (FLAGYL) 500 MG tablet       Multiple Vitamin (MULTIVITAMIN ADULT PO) Take 1 tablet by mouth      tamoxifen (NOLVADEX) 20 MG tablet Take 1 tablet (20 mg) by mouth daily 90 tablet 3    Vitamin D, Cholecalciferol, 25 MCG (1000 UT) CAPS            ALLERGIES:  Allergies   Allergen Reactions    Cat Dander [Animal Dander] Unknown    Dog Dander [Dog Epithelium (Canis Lupus Familiaris)] Unknown    Other Environmental Allergy Unknown    Seasonal Allergies      Other Reaction(s): Unknown         PAST MEDICAL HISTORY:  Past Medical History:   Diagnosis Date    Cancer (H)      Infectious viral hepatitis     Personal history of malaria          PAST SURGICAL HISTORY:  Past Surgical History:   Procedure Laterality Date    OTHER SURGICAL HISTORY      Hairy nevis    KY MASTECTOMY, PARTIAL Left 2021    Procedure: Left Lumpectomy (With MSC u/s): Rio Lymph Node Biopsy;  Surgeon: Sigrid Ramirez MD;  Location: [facility];  Service: General    KY MASTECTOMY, PARTIAL Left 2021    Procedure: Evacuation Hematoma, Re-excision Lumpectomy;  Surgeon: Sigrid Ramirez MD;  Location: [facility];  Service: General    US SENTINEL NODE INJECTION  2021    VAGINAL DELIVERY      WISDOM TOOTH EXTRACTION      ZZC NONSPECIFIC PROCEDURE      nevus removal         SOCIAL HISTORY:  Social History     Socioeconomic History    Marital status:      Spouse name: Not on file    Number of children: 0    Years of education: 16    Highest education level: Not on file   Occupational History    Occupation: [employer]   Tobacco Use    Smoking status: Former     Current packs/day: 0.00     Average packs/day: 0.3 packs/day for 2.0 years (0.5 ttl pk-yrs)     Types: Cigarettes     Start date: 2005     Quit date: 2007     Years since quittin.6    Smokeless tobacco: Never   Substance and Sexual Activity    Alcohol use: Yes     Comment: Alcoholic Drinks/day: 5 X week    Drug use: Never    Sexual activity: Not Currently   Other Topics Concern    Parent/sibling w/ CABG, MI or angioplasty before 65F 55M? No   Social History Narrative    Not on file     Social Determinants of Health     Financial Resource Strain: Not on file   Food Insecurity: Not on file   Transportation Needs: Not on file   Physical Activity: Not on file   Stress: Not on file   Social Connections: Not on file   Interpersonal Safety: Not on file   Housing Stability: Not on file         FAMILY HISTORY:  Family History   Problem Relation Age of Onset    Pancreatic Cancer Mother     Breast Cancer Maternal Aunt      "Melanoma Maternal Aunt     Colon Cancer Paternal Grandmother          PHYSICAL EXAM:  Vital signs:  /75 (BP Location: Right arm)   Pulse 69   Resp 16   Ht 1.753 m (5' 9.02\")   Wt 63.5 kg (140 lb)   LMP 2024 (Exact Date)   SpO2 100%   BMI 20.66 kg/m         ECO  GENERAL/CONSTITUTIONAL: No acute distress.  EYES: Pupils are equal and round. Extraocular movements intact without nystagmus.  No scleral icterus.  RESPIRATORY: Equal chest rise.   MUSCULOSKELETAL: Warm and well-perfused, no cyanosis  NEUROLOGIC: Cranial nerves are grossly intact. Alert, oriented to person, place and time, answers questions appropriately.  INTEGUMENTARY: No rashes or jaundice.  GAIT: Steady, does not use assistive device  BREAST: Right-no palpable mass, discharge, rash, or axillary lymphadenopathy.  Left-no palpable mass, discharge, rash, or axillary lymphadenopathy. Scar above aroela noted          LABS:   Latest Reference Range & Units 24 08:21   Sodium 135 - 145 mmol/L 140   Potassium 3.4 - 5.3 mmol/L 3.8   Chloride 98 - 107 mmol/L 106   Carbon Dioxide (CO2) 22 - 29 mmol/L 26   Urea Nitrogen 6.0 - 20.0 mg/dL 16.3   Creatinine 0.51 - 0.95 mg/dL 0.82   GFR Estimate >60 mL/min/1.73m2 90   Calcium 8.8 - 10.4 mg/dL 9.1   Anion Gap 7 - 15 mmol/L 8   Albumin 3.5 - 5.2 g/dL 4.2   Protein Total 6.4 - 8.3 g/dL 7.1   Alkaline Phosphatase 40 - 150 U/L 32 (L)   ALT 0 - 50 U/L 21   AST 0 - 45 U/L 32   Bilirubin Total <=1.2 mg/dL 0.5   Cholesterol <200 mg/dL 179   Patient Fasting?  Yes  Yes   Glucose 70 - 99 mg/dL 100 (H)   HDL Cholesterol >=50 mg/dL 63   Hemoglobin A1C 0.0 - 5.6 % 5.5   LDL Cholesterol Calculated <=100 mg/dL 100   Non HDL Cholesterol <130 mg/dL 116   Triglycerides <150 mg/dL 79   WBC 4.0 - 11.0 10e3/uL 3.7 (L)   Hemoglobin 11.7 - 15.7 g/dL 13.1   Hematocrit 35.0 - 47.0 % 39.7   Platelet Count 150 - 450 10e3/uL 229   RBC Count 3.80 - 5.20 10e6/uL 4.21   MCV 78 - 100 fL 94   MCH 26.5 - 33.0 pg 31.1   MCHC 31.5 " - 36.5 g/dL 33.0   RDW 10.0 - 15.0 % 12.5   % Neutrophils % 57   % Lymphocytes % 29   % Monocytes % 9   % Eosinophils % 5   % Basophils % 1   Absolute Basophils 0.0 - 0.2 10e3/uL 0.0   Absolute Eosinophils 0.0 - 0.7 10e3/uL 0.2   Absolute Immature Granulocytes <=0.4 10e3/uL 0.0   Absolute Lymphocytes 0.8 - 5.3 10e3/uL 1.1   Absolute Monocytes 0.0 - 1.3 10e3/uL 0.3   % Immature Granulocytes % 0   Absolute Neutrophils 1.6 - 8.3 10e3/uL 2.1   Absolute NRBCs 10e3/uL 0.0   NRBCs per 100 WBC <1 /100 0   (L): Data is abnormally low  (H): Data is abnormally high  PATHOLOGY:      IMAGING:  Narrative & Impression   MA DIAGNOSTIC BILATERAL W/ ANTOINE, US BREAST BILATERAL LIMITED 1-3  QUADRANTS -  6/24/2024 11:10 AM     HISTORY:  Bilateral breast pain at the lateral aspect of both breasts,  improved on the left. Personal history of left breast malignancy  status post breast conservation treatment in 2021.     COMPARISON:  Mammograms dated 6/2/2023, 2/6/2023, 6/1/2022, 5/18/2021     MAMMOGRAPHIC FINDINGS:  Bilateral digital diagnostic mammograms  performed. The breast tissue is heterogeneously dense which may  obscure small masses. There are breast conservation treatment changes  on the left. No mammographic evidence for malignancy in either breast.  Breast tomosynthesis and computer-aided detection used in  interpretation.     ULTRASOUND FINDINGS: Bilateral targeted ultrasound was performed and  areas of pain as directed by the patient at the lateral aspect of both  breasts. Normal tissue is visualized. No masses or suspicious  abnormality. No evidence for malignancy.                                                                      IMPRESSION:  No evidence for malignancy.     BI-RADS CATEGORY: 2 - Benign.     Results given to the patient who can resume routine screening  mammography Given the lack imaging findings further management of pain  should be based on clinical grounds.     ALEXIS HENRY MD            ASSESSMENT/PLAN:  Antonella Jamison is a 43 year old female with:      #  pT1c pN0 (sn) LEFT breast IDC, ER positive, NC positive, HER2 negative  -Patient's diagnostic imaging is not available for my review  -5/2021 ultrasound-guided left breast biopsy of 12:00 lesion, 4 cm from nipple  PATHOLOGY: IDC, 0.7 cm, grade 1; DCIS grade 2, ER positive (99%, strong), NC positive (97%, moderate), HER2 negative (1+ on IHC  - 6/9/2021-left breast lumpectomy and left sentinel lymph node biopsy.    PATHOLOGY: IDC, grade 1, 1.5 x 1.0 x 0.9 cm, focal LVI, margins negative but close (0.8 mm from posterior margin); DCIS, low-grade, 20% of the tumor measuring up to 1.8 x 0.4 cm in sections away from invasive carcinoma, margins positive (posterior multifocal and medial unifocal; left axillary SLNBx negative (0/2), pT1c, (sn)pN0, pM-Not applicable   -6/14/2021-underwent reexcision due to close margins.  - Oncotype DX breast recurrence score report:  Recurrence score result: 11  Distant recurrence risk at 9 years with adjuvant endocrine therapy alone: 3%  Group average absolute chemotherapy benefit: <1%  -7/28/2021-8/24/2021-adjuvant radiation to the breast 5240 cGy in 20 treatments.  (Dr. Mayer)  -8/2021-initiated tamoxifen    -7/21 [facility] Common hereditary cancers panel, TRAVIS and CHEK2: Negative for mutations in 47 genes tested    Last imaging:  - 6/24 bilateral diagnostic mammogram:The breast tissue is heterogeneously dense which may obscure small masses. There are breast conservation treatment changes on the left. No mammographic evidence for malignancy in either breast.  - 6/24 bilateral breast targeted ultrasound: Visualization of areas of pain directed by patient at lateral aspects of both breasts, FER  Overall BI-RADS 2, follow-up 1 year    Last labs:  - 8/24 CBC WBC 3.7, normal differential, CMP WNL, fasting lipid panel WNL, hemoglobin A1c 5.5    PLAN:  - Continue tamoxifen, refilled today  - Plan for at least 5 years of  adjuvant endocrine therapy (8/2026)  - Next mammogram due 6/2025-to be ordered later  - Check CBC, CMP in 6 months-ordered today  - Check fasting lipid panel, hemoglobin A1c 8/2025-to be ordered later  - Continue annual gynecology visit and ophthalmology visit  - Monitor for signs and symptoms of DVT/PE    #recurrent bacterial vaginosis  - completed flagyl and fluconazole 2/24-8/24.     RTC 6 months for follow-up with [facility], labs prior to visit    Barby DO Bert  Hematology/Oncology  [facility]

## 2024-08-29 NOTE — NURSING NOTE
"Oncology Rooming Note    August 29, 2024 8:42 AM   Antonella Jamison is a 43 year old female who presents for:    Chief Complaint   Patient presents with    Oncology Clinic Visit     6 month follow up     Initial Vitals: /75 (BP Location: Right arm)   Pulse 69   Resp 16   Ht 1.753 m (5' 9.02\")   Wt 63.5 kg (140 lb)   LMP 08/22/2024 (Exact Date)   SpO2 100%   BMI 20.66 kg/m   Estimated body mass index is 20.66 kg/m  as calculated from the following:    Height as of this encounter: 1.753 m (5' 9.02\").    Weight as of this encounter: 63.5 kg (140 lb). Body surface area is 1.76 meters squared.  No Pain (0) Comment: Data Unavailable   Patient's last menstrual period was 08/22/2024 (exact date).  Allergies reviewed: Yes  Medications reviewed: Yes    Medications: Medication refills not needed today.  Pharmacy name entered into EPIC: [id number] - [place], [place] - [facility] [address]    Frailty Screening:   Is the patient here for a new oncology consult visit in cancer care? 2. No      Clinical concerns: No new concerns         Christine Spaulding LPN              "

## 2024-12-04 ENCOUNTER — MEDICAL CORRESPONDENCE (OUTPATIENT)
Dept: HEALTH INFORMATION MANAGEMENT | Facility: CLINIC | Age: 44
End: 2024-12-04

## 2025-01-13 ENCOUNTER — TRANSCRIBE ORDERS (OUTPATIENT)
Dept: OTHER | Age: 45
End: 2025-01-13

## 2025-01-13 DIAGNOSIS — Z12.11 SCREENING FOR COLORECTAL CANCER: Primary | ICD-10-CM

## 2025-01-13 DIAGNOSIS — Z12.12 SCREENING FOR COLORECTAL CANCER: Primary | ICD-10-CM

## 2025-01-21 ENCOUNTER — TELEPHONE (OUTPATIENT)
Dept: GASTROENTEROLOGY | Facility: CLINIC | Age: 45
End: 2025-01-21
Payer: COMMERCIAL

## 2025-01-21 NOTE — TELEPHONE ENCOUNTER
Upon chart review, writer notes that patient stated in office visit notes from 12/4/24 she drinks 12-15 drinks per week. Patient is currently scheduled with CS. Message sent to scoping provider to confirm appropriate sedation.     --------------------------------------------------------------------------------------------------------------------    Pre visit planning completed.      Procedure details:    Patient scheduled for Colonoscopy on 1/31/25.     Approximate arrival time: 0645. Procedure time 0730.   *Ensure patient is aware that endoscopy team will be calling about 2 days prior to procedure date to confirm arrival time as this may change.     Facility location: [facility]; [address] [place] [address], [address] Floor, [place] [address]. Check in location: 2nd Floor at Surgery desk.  *Disclaimer: Drivers are to check in with patient and stay on campus during procedure.     Sedation type: Conscious sedation     Pre op exam needed? No.    Indication for procedure: screening for colon cancer      Chart review:     Electronic implanted devices? No    Recent diagnosis of diverticulitis within the last 6 weeks? No      Medication review:    Diabetic? No    Anticoagulants? No    Weight loss medication/injectable? No GLP-1 medication per patient's medication list. Nursing to verify with pre-assessment call.    Other medication HOLDING recommendations:  N/A      Prep for procedure:     Bowel prep recommendation: Standard Miralax.   Due to: standard bowel prep    Procedure information and instructions sent via [employer] Blevins LPN  Endoscopy Procedure Pre Assessment   [phone] option 2

## 2025-01-21 NOTE — TELEPHONE ENCOUNTER
"Endoscopy Scheduling Screen    Have you had any respiratory illness or flu-like symptoms in the last 10 days?  No    What is your communication preference for Instructions and/or Bowel Prep?   MyChart    What insurance is in the chart?  Other:  [employer]    Ordering/Referring Provider:   TRUE BARTHOLOMEW        (If ordering provider performs procedure, schedule with ordering provider unless otherwise instructed. )    BMI: Estimated body mass index is 20.66 kg/m  as calculated from the following:    Height as of 8/29/24: 1.753 m (5' 9.02\").    Weight as of 8/29/24: 63.5 kg (140 lb).     Sedation Ordered  moderate sedation.   If patient BMI > 50 do not schedule in [facility].    If patient BMI > 45 do not schedule at [facility].    Are you taking methadone or Suboxone?  NO, No RN review required.    Have you been diagnosed and are being treated for severe PTSD or severe anxiety?  NO, No RN review required.    Are you taking any prescription medications for pain 3 or more times per week?   NO, No RN review required.    Do you have a history of malignant hyperthermia?  No    (Females) Are you currently pregnant?   No     Have you been diagnosed or told you have pulmonary hypertension?   No    Do you have an LVAD?  No    Have you been told you have moderate to severe sleep apnea?  No.    Have you been told you have COPD, asthma, or any other lung disease?  No    Do you have any heart conditions?  No     Have you ever had or are you waiting for an organ transplant?  No. Continue scheduling, no site restrictions.    Have you had a stroke or transient ischemic attack (TIA aka \"mini stroke\" in the last 6 months?   No    Have you been diagnosed with or been told you have cirrhosis of the liver?   No.    Are you currently on dialysis?   No    Do you need assistance transferring?   No    BMI: Estimated body mass index is 20.66 kg/m  as calculated from the following:    Height as of 8/29/24: 1.753 m (5' 9.02\").    Weight as of 8/29/24: 63.5 kg " (140 lb).     Is patients BMI > 40 and scheduling location [facility]?  No    Do you take an injectable or oral medication for weight loss or diabetes (excluding insulin)?  No    Do you take the medication Naltrexone?  No    Do you take blood thinners?  No       Prep   Are you currently on dialysis or do you have chronic kidney disease?  No    Do you have a diagnosis of diabetes?  No    Do you have a diagnosis of cystic fibrosis (CF)?  No    On a regular basis do you go 3 -5 days between bowel movements?  No    BMI > 40?  No    Preferred Pharmacy:    [id number] [place], [place] - [address] [address]  Phone: [phone] Fax: [phone]    Final Scheduling Details     Procedure scheduled  Colonoscopy    Surgeon:  LARRY     Date of procedure:  01/31/2025     Pre-OP / PAC:   No - Not required for this site.    Location  [facility] - [facility] - Per order.    Sedation   Moderate Sedation - Per order.      Patient Reminders:   You will receive a call from a Nurse to review instructions and health history.  This assessment must be completed prior to your procedure.  Failure to complete the Nurse assessment may result in the procedure being cancelled.      On the day of your procedure, please designate an adult(s) who can drive you home stay with you for the next 24 hours. The medicines used in the exam will make you sleepy. You will not be able to drive.      You cannot take public transportation, ride share services, or non-medical taxi service without a responsible caregiver.  Medical transport services are allowed with the requirement that a responsible caregiver will receive you at your destination.  We require that drivers and caregivers are confirmed prior to your procedure.

## 2025-01-22 NOTE — TELEPHONE ENCOUNTER
Pre assessment completed for upcoming procedure.      Procedure details:    Patient scheduled for Colonoscopy on 1/31/25.     Approximate arrival time: 0645. Procedure time 0730.   *Ensure patient is aware that endoscopy team will be calling about 2 days prior to procedure date to confirm arrival time as this may change.      Facility location: [facility]; [address] Floor, [place] [address]. Check in location: 2nd Floor at Surgery desk.  *Disclaimer: Drivers are to check in with patient and stay on campus during procedure.      Sedation type: Conscious sedation. Did discuss sedation with the patient. She would like to continue with CS. She feels that it will not be a problem.     Pre op exam needed? No.     Indication for procedure: screening for colon cancer    Designated  policy reviewed. Instructed to have someone stay 6 hours post procedure.       Chart review:     Electronic implanted devices? No    Recent diagnosis of diverticulitis within the last 6 weeks?  No      Medication review:    Diabetic? No    Anticoagulants? No    Weight loss medication/injectable? No.    Other medication HOLDING recommendations:  N/A      Prep for procedure:     Reviewed procedure prep instructions.     Patient verbalized understanding and had no questions or concerns at this time.        Agustina Aparicio LPN  Endoscopy Procedure Pre Assessment   [phone] option 2         CT Head- L thalamic bleed, subacute with local mass effect CT Head- L thalamic bleed, subacute with local mass effect  Pelvis 2 view, no acute fx, dislocation or soft tissue abnormality, ? bladder stone

## 2025-01-22 NOTE — TELEPHONE ENCOUNTER
UPDATE:    Eduarda Lebron MD Juenemann, Jill, LPN  You can let patient know she might have better sedation if she rescheduled when she can have MAC. I am fine with trying with CS if she prefers not to reschedule.     Please discuss sedation with patient when completing PA call     Ainsley Blevins LPN on 1/22/2025 at 8:28 AM

## 2025-01-29 ENCOUNTER — TELEPHONE (OUTPATIENT)
Dept: GASTROENTEROLOGY | Facility: CLINIC | Age: 45
End: 2025-01-29
Payer: COMMERCIAL

## 2025-01-31 ENCOUNTER — HOSPITAL ENCOUNTER (OUTPATIENT)
Facility: AMBULATORY SURGERY CENTER | Age: 45
Discharge: HOME | End: 2025-01-31
Attending: FAMILY MEDICINE | Admitting: FAMILY MEDICINE
Payer: COMMERCIAL

## 2025-01-31 VITALS
OXYGEN SATURATION: 100 % | HEART RATE: 74 BPM | DIASTOLIC BLOOD PRESSURE: 83 MMHG | TEMPERATURE: 97.3 F | RESPIRATION RATE: 16 BRPM | SYSTOLIC BLOOD PRESSURE: 126 MMHG

## 2025-01-31 DIAGNOSIS — Z12.11 SCREEN FOR COLON CANCER: Primary | ICD-10-CM

## 2025-01-31 LAB — COLONOSCOPY: NORMAL

## 2025-01-31 PROCEDURE — G8918 PT W/O PREOP ORDER IV AB PRO: HCPCS

## 2025-01-31 PROCEDURE — 88305 TISSUE EXAM BY PATHOLOGIST: CPT | Performed by: STUDENT IN AN ORGANIZED HEALTH CARE EDUCATION/TRAINING PROGRAM

## 2025-01-31 PROCEDURE — 45385 COLONOSCOPY W/LESION REMOVAL: CPT | Mod: PT | Performed by: FAMILY MEDICINE

## 2025-01-31 PROCEDURE — 99152 MOD SED SAME PHYS/QHP 5/>YRS: CPT | Mod: 59 | Performed by: FAMILY MEDICINE

## 2025-01-31 PROCEDURE — 45385 COLONOSCOPY W/LESION REMOVAL: CPT

## 2025-01-31 PROCEDURE — 99153 MOD SED SAME PHYS/QHP EA: CPT | Mod: PT | Performed by: FAMILY MEDICINE

## 2025-01-31 PROCEDURE — G8907 PT DOC NO EVENTS ON DISCHARG: HCPCS

## 2025-01-31 RX ORDER — LIDOCAINE 40 MG/G
CREAM TOPICAL
Status: DISCONTINUED | OUTPATIENT
Start: 2025-01-31 | End: 2025-02-01 | Stop reason: HOSPADM

## 2025-01-31 RX ORDER — ONDANSETRON 2 MG/ML
4 INJECTION INTRAMUSCULAR; INTRAVENOUS
Status: DISCONTINUED | OUTPATIENT
Start: 2025-01-31 | End: 2025-02-01 | Stop reason: HOSPADM

## 2025-01-31 RX ORDER — FENTANYL CITRATE 50 UG/ML
INJECTION, SOLUTION INTRAMUSCULAR; INTRAVENOUS PRN
Status: DISCONTINUED | OUTPATIENT
Start: 2025-01-31 | End: 2025-01-31 | Stop reason: HOSPADM

## 2025-01-31 NOTE — H&P
Pre-Endoscopy History and Physical     Antonella Jamison MRN# [id number]   YOB: 1980 Age: 44 year old     Date of Procedure: 1/31/2025  Primary care provider: Rosemary Mata  Type of Endoscopy: colonoscopy  Reason for Procedure: screening, PGM colon cancer >60  Type of Anesthesia Anticipated: Moderate Sedation    HPI:    Antonella is a 44 year old female who will be undergoing the above procedure.      A history and physical has been performed. The patient's medications and allergies have been reviewed. The risks and benefits of the procedure and the sedation options and risks were discussed with the patient.  All questions were answered and informed consent was obtained.      She denies a personal or family history of anesthesia complications or bleeding disorders.     Allergies   Allergen Reactions    Cat Dander [Animal Dander] Unknown    Dog Dander [Dog Epithelium (Canis Lupus Familiaris)] Unknown    Other Environmental Allergy Unknown    Seasonal Allergies      Other Reaction(s): Unknown        Cannot display prior to admission medications because the patient has not been admitted in this contact.       Patient Active Problem List   Diagnosis    Malignant neoplasm of central portion of left breast in female, estrogen receptor positive (H)    Long-term current use of tamoxifen        Past Medical History:   Diagnosis Date    Cancer (H)     Infectious viral hepatitis     Personal history of malaria         Past Surgical History:   Procedure Laterality Date    OTHER SURGICAL HISTORY      Hairy nevis    [place] MASTECTOMY, PARTIAL Left 6/9/2021    Procedure: Left Lumpectomy (With MSC u/s): [place] Lymph Node Biopsy;  Surgeon: Sigrid Ramirez MD;  Location: [facility];  Service: General    [place] MASTECTOMY, PARTIAL Left 6/14/2021    Procedure: Evacuation Hematoma, Re-excision Lumpectomy;  Surgeon: Sigrid Ramirez MD;  Location: [facility];  Service: General    US SENTINEL NODE  "INJECTION  2021    VAGINAL DELIVERY      WISDOM TOOTH EXTRACTION      ZZC NONSPECIFIC PROCEDURE      nevus removal       Social History     Tobacco Use    Smoking status: Former     Current packs/day: 0.00     Average packs/day: 0.3 packs/day for 2.0 years (0.5 ttl pk-yrs)     Types: Cigarettes     Start date: 2005     Quit date: 2007     Years since quittin.0    Smokeless tobacco: Never   Substance Use Topics    Alcohol use: Yes     Comment: Alcoholic Drinks/day: 5 X week       Family History   Problem Relation Age of Onset    Pancreatic Cancer Mother     Breast Cancer Maternal Aunt     Melanoma Maternal Aunt     Colon Cancer Paternal Grandmother        REVIEW OF SYSTEMS:     5 point ROS negative except as noted above in HPI, including Gen., Resp., CV, GI &  system review.      PHYSICAL EXAM:   /83   Pulse 90   Temp 97.3  F (36.3  C) (Temporal)   Resp 16   SpO2 100%  Estimated body mass index is 20.66 kg/m  as calculated from the following:    Height as of 24: 1.753 m (5' 9.02\").    Weight as of 24: 63.5 kg (140 lb).   GENERAL APPEARANCE: healthy, alert, and no distress  MENTAL STATUS: alert and oriented x 3  AIRWAY EXAM: Mallampatti Class II (visualization of the soft palate, fauces, and uvula)  RESP: lungs clear to auscultation - no rales, rhonchi or wheezes  CV: regular rates and rhythm and normal S1 S2, no S3 or S4      DIAGNOSTICS:    Not indicated      IMPRESSION   ASA Class 2 - Mild systemic disease        PLAN:       Plan for colonoscopy. We discussed the risks, benefits and alternatives and the patient wished to proceed.    The above has been forwarded to the consulting provider.      Signed Electronically by: Eduarda Lebron MD  2025    " room air

## 2025-02-03 LAB
PATH REPORT.COMMENTS IMP SPEC: NORMAL
PATH REPORT.COMMENTS IMP SPEC: NORMAL
PATH REPORT.FINAL DX SPEC: NORMAL
PATH REPORT.GROSS SPEC: NORMAL
PATH REPORT.MICROSCOPIC SPEC OTHER STN: NORMAL
PATH REPORT.RELEVANT HX SPEC: NORMAL
PHOTO IMAGE: NORMAL

## 2025-02-28 NOTE — PROGRESS NOTES
Oncology Follow Up Visit: March 4, 2025    Oncologist: Dr MILKA Noel  PCP: Rosemary Mata    Diagnosis: Left breast hormone positive cancer  Antonella Jamison is a 44 year old female who found lump to left breast in 5/2021  -5/2021 ultrasound-guided left breast biopsy of 12:00 lesion, 4 cm from nipple  Invasive ductal carcinoma, measuring 0.7 cm in core biopsy ,[place] grade: I, with DCIS, ER/OK +, HER2 by IHC: Negative (1+ by manual morphometry)   - 6/9/2021-left breast lumpectomy and left sentinel lymph node biopsy.    A) LEFT BREAST, ORIENTED LUMPECTOMY:        1) INVASIVE DUCTAL CARCINOMA             a) Grade: Gumaro grade I (of III)             b) Size:  15 x 11 x 9 mm (measured and calculated in slides)   c) Margins: Uninvolved, but very close to, at 0.8 mm from the nearest   posterior margin, and 4 mm from anterior margin        2) DUCTAL CARCINOMA IN SITU: EIC Negative (SEE COMMENT)             a) Nuclear grade: Low to intermediate             b) Patterns: Cribriform and solid, with focal comedonecrosis             c) Size:  20% of the tumor, and measured up to 18 x 4 mm in   sections away from invasive carcinoma             d) Margins: Positive, at posterior (multifocal) and medial   (unifocal) margins   - 0/2 SLN - no metastasis  PATHOLOGIC STAGE: pT1c, pN0 (sn)  -6/14/2021-underwent reexcision due to close margins (and hematoma evacuation)  - Oncotype DX = 11  -7/28/2021-8/24/2021-adjuvant radiation to the breast 5240 cGy in 20 treatments.  (Dr. Mayer)  -8/2021-initiated tamoxifen    -7/21 [facility] Common hereditary cancers panel, TRAVIS and CHEK2: Negative for mutations in 47 genes tested    Interval History: Ms Jamison comes to clinic for review of her breast cancer and use of Tamoxifen since 8/2021- pt now into treatment x 3.5 years. Pt states she is doing well and has no complaints. Some hot flashes noted yet but tolerable.   She is eating well with weight stable.   No new breast/chest  "changes.   Wonders if in perimenopause. Is good about regular exercise  Rest of comprehensive and complete ROS is reviewed and is negative.   Past Medical History:   Diagnosis Date    Cancer (H)     Infectious viral hepatitis     Personal history of malaria      Current Outpatient Medications   Medication Sig Dispense Refill    acetaminophen (TYLENOL) 325 MG tablet 2 tab(s)      amoxicillin-clavulanate (AUGMENTIN) 875-125 MG tablet Take 1 tablet by mouth 2 times daily.      Cyanocobalamin (RAPID B-12 ENERGY) 200 MCG/SPRAY LIQD Place 1 spray under the tongue      fish oil-omega-3 fatty acids 500 MG capsule       fluconazole (DIFLUCAN) 150 MG tablet       ibuprofen (ADVIL/MOTRIN) 200 MG tablet Take 200-400 mg by mouth      lactobacillus rhamnosus, GG, (CULTURELL) capsule Take 1 capsule by mouth 2 times daily      loratadine (CLARITIN) 10 MG tablet 1 tab(s)      magnesium 250 MG tablet Take 1 tablet by mouth daily      metroNIDAZOLE (FLAGYL) 500 MG tablet       Multiple Vitamin (MULTIVITAMIN ADULT PO) Take 1 tablet by mouth      tamoxifen (NOLVADEX) 20 MG tablet Take 1 tablet (20 mg) by mouth daily. 90 tablet 3    Vitamin D, Cholecalciferol, 25 MCG (1000 UT) CAPS        No current facility-administered medications for this visit.     Allergies   Allergen Reactions    Cat Dander [Animal Dander] Unknown    Dog Dander [Dog Epithelium (Canis Lupus Familiaris)] Unknown    Other Environmental Allergy Unknown    Seasonal Allergies      Other Reaction(s): Unknown       Physical Exam:/69 (BP Location: Right arm)   Pulse 79   Temp 98.8  F (37.1  C) (Oral)   Ht 1.753 m (5' 9.02\")   LMP 02/09/2025   SpO2 99%   BMI 20.66 kg/m     ECOG PS- 0  Constitutional: Alert, healthy, and in no distress.   ENT: Eyes bright , No mouth sores  Neck: Supple, No adenopathy.  Cardiac: Heart rate and rhythm is regular and strong without murmur  Respiratory: Breathing easy. Lung sounds clear to auscultation  Breasts: bilaterally without " new masses or tenderness or discharge.   GI: Abdomen is soft, non-tender, BS normal. No masses or organomegaly  MS: Muscle tone normal, extremities normal with no edema.   Skin: No suspicious lesions or rashes  Neuro: Sensory grossly WNL, gait normal.   Lymph: Normal ant/post cervical, axillary, supraclavicular nodes  Psych: Mentation appears normal and affect normal/bright and smiling    Laboratory/Imaging Results:   Results for orders placed or performed in visit on 03/03/25   Comprehensive metabolic panel     Status: Abnormal   Result Value Ref Range    Sodium 142 135 - 145 mmol/L    Potassium 4.2 3.4 - 5.3 mmol/L    Carbon Dioxide (CO2) 25 22 - 29 mmol/L    Anion Gap 11 7 - 15 mmol/L    Urea Nitrogen 18.1 6.0 - 20.0 mg/dL    Creatinine 0.79 0.51 - 0.95 mg/dL    GFR Estimate >90 >60 mL/min/1.73m2    Calcium 9.5 8.8 - 10.4 mg/dL    Chloride 106 98 - 107 mmol/L    Glucose 60 (L) 70 - 99 mg/dL    Alkaline Phosphatase 42 40 - 150 U/L    AST 21 0 - 45 U/L    ALT 18 0 - 50 U/L    Protein Total 7.2 6.4 - 8.3 g/dL    Albumin 4.1 3.5 - 5.2 g/dL    Bilirubin Total 0.3 <=1.2 mg/dL   Extra Tube     Status: None    Narrative    The following orders were created for panel order Extra Tube.  Procedure                               Abnormality         Status                     ---------                               -----------         ------                     Extra Serum Separator Tu...[[id number]]                      Final result                 Please view results for these tests on the individual orders.   CBC with platelets and differential     Status: None   Result Value Ref Range    WBC Count 5.4 4.0 - 11.0 10e3/uL    RBC Count 4.06 3.80 - 5.20 10e6/uL    Hemoglobin 12.4 11.7 - 15.7 g/dL    Hematocrit 37.2 35.0 - 47.0 %    MCV 92 78 - 100 fL    MCH 30.5 26.5 - 33.0 pg    MCHC 33.3 31.5 - 36.5 g/dL    RDW 12.1 10.0 - 15.0 %    Platelet Count 287 150 - 450 10e3/uL    % Neutrophils 51 %    % Lymphocytes 28 %    %  Monocytes 11 %    % Eosinophils 8 %    % Basophils 1 %    % Immature Granulocytes 0 %    NRBCs per 100 WBC 0 <1 /100    Absolute Neutrophils 2.8 1.6 - 8.3 10e3/uL    Absolute Lymphocytes 1.5 0.8 - 5.3 10e3/uL    Absolute Monocytes 0.6 0.0 - 1.3 10e3/uL    Absolute Eosinophils 0.5 0.0 - 0.7 10e3/uL    Absolute Basophils 0.1 0.0 - 0.2 10e3/uL    Absolute Immature Granulocytes 0.0 <=0.4 10e3/uL    Absolute NRBCs 0.0 10e3/uL   Extra Serum Separator Tube (SST)     Status: None   Result Value Ref Range    Hold Specimen [facility]    CBC with Platelets & Differential     Status: None    Narrative    The following orders were created for panel order CBC with Platelets & Differential.  Procedure                               Abnormality         Status                     ---------                               -----------         ------                     CBC with platelets and d...[[id number]]                      Final result                 Please view results for these tests on the individual orders.       Assessment and Plan:   Left breast hormone positive cancer- Pt completed lumpectomy with SLN bx , repeat margin excision, radiation therapy and then began Tamoxifen use by 8/2021. She continues with Tamoxifen and is tolerating well.   Planning on 5 years of use.   Labs reviewed as acceptable.   Last mammogram was 6/24/2024- repeat annually- order given  Reminded of need for annual uterine evaluation due to Tamoxifen use.   Return to clinic for review in 6 months with no labs necessary.       The total time of this encounter amounted to  30 minutes. This time included face to face time spent with the patient, prep work, ordering tests, and performing post visit documentation.  Bambi Gonzalez,Cnp

## 2025-03-03 ENCOUNTER — ONCOLOGY VISIT (OUTPATIENT)
Dept: ONCOLOGY | Facility: CLINIC | Age: 45
End: 2025-03-03
Attending: INTERNAL MEDICINE
Payer: COMMERCIAL

## 2025-03-03 ENCOUNTER — LAB (OUTPATIENT)
Dept: INFUSION THERAPY | Facility: CLINIC | Age: 45
End: 2025-03-03
Attending: INTERNAL MEDICINE
Payer: COMMERCIAL

## 2025-03-03 VITALS
OXYGEN SATURATION: 99 % | HEART RATE: 79 BPM | DIASTOLIC BLOOD PRESSURE: 69 MMHG | BODY MASS INDEX: 20.66 KG/M2 | HEIGHT: 69 IN | SYSTOLIC BLOOD PRESSURE: 104 MMHG | TEMPERATURE: 98.8 F

## 2025-03-03 DIAGNOSIS — Z17.0 MALIGNANT NEOPLASM OF CENTRAL PORTION OF LEFT BREAST IN FEMALE, ESTROGEN RECEPTOR POSITIVE (H): Primary | ICD-10-CM

## 2025-03-03 DIAGNOSIS — Z12.31 ENCOUNTER FOR SCREENING MAMMOGRAM FOR BREAST CANCER: ICD-10-CM

## 2025-03-03 DIAGNOSIS — C50.112 MALIGNANT NEOPLASM OF CENTRAL PORTION OF LEFT BREAST IN FEMALE, ESTROGEN RECEPTOR POSITIVE (H): Primary | ICD-10-CM

## 2025-03-03 DIAGNOSIS — Z79.810 LONG-TERM CURRENT USE OF TAMOXIFEN: ICD-10-CM

## 2025-03-03 LAB
ALBUMIN SERPL BCG-MCNC: 4.1 G/DL (ref 3.5–5.2)
ALP SERPL-CCNC: 42 U/L (ref 40–150)
ALT SERPL W P-5'-P-CCNC: 18 U/L (ref 0–50)
ANION GAP SERPL CALCULATED.3IONS-SCNC: 11 MMOL/L (ref 7–15)
AST SERPL W P-5'-P-CCNC: 21 U/L (ref 0–45)
BASOPHILS # BLD AUTO: 0.1 10E3/UL (ref 0–0.2)
BASOPHILS NFR BLD AUTO: 1 %
BILIRUB SERPL-MCNC: 0.3 MG/DL
BUN SERPL-MCNC: 18.1 MG/DL (ref 6–20)
CALCIUM SERPL-MCNC: 9.5 MG/DL (ref 8.8–10.4)
CHLORIDE SERPL-SCNC: 106 MMOL/L (ref 98–107)
CREAT SERPL-MCNC: 0.79 MG/DL (ref 0.51–0.95)
EGFRCR SERPLBLD CKD-EPI 2021: >90 ML/MIN/1.73M2
EOSINOPHIL # BLD AUTO: 0.5 10E3/UL (ref 0–0.7)
EOSINOPHIL NFR BLD AUTO: 8 %
ERYTHROCYTE [DISTWIDTH] IN BLOOD BY AUTOMATED COUNT: 12.1 % (ref 10–15)
GLUCOSE SERPL-MCNC: 60 MG/DL (ref 70–99)
HCO3 SERPL-SCNC: 25 MMOL/L (ref 22–29)
HCT VFR BLD AUTO: 37.2 % (ref 35–47)
HGB BLD-MCNC: 12.4 G/DL (ref 11.7–15.7)
HOLD SPECIMEN: NORMAL
IMM GRANULOCYTES # BLD: 0 10E3/UL
IMM GRANULOCYTES NFR BLD: 0 %
LYMPHOCYTES # BLD AUTO: 1.5 10E3/UL (ref 0.8–5.3)
LYMPHOCYTES NFR BLD AUTO: 28 %
MCH RBC QN AUTO: 30.5 PG (ref 26.5–33)
MCHC RBC AUTO-ENTMCNC: 33.3 G/DL (ref 31.5–36.5)
MCV RBC AUTO: 92 FL (ref 78–100)
MONOCYTES # BLD AUTO: 0.6 10E3/UL (ref 0–1.3)
MONOCYTES NFR BLD AUTO: 11 %
NEUTROPHILS # BLD AUTO: 2.8 10E3/UL (ref 1.6–8.3)
NEUTROPHILS NFR BLD AUTO: 51 %
NRBC # BLD AUTO: 0 10E3/UL
NRBC BLD AUTO-RTO: 0 /100
PLATELET # BLD AUTO: 287 10E3/UL (ref 150–450)
POTASSIUM SERPL-SCNC: 4.2 MMOL/L (ref 3.4–5.3)
PROT SERPL-MCNC: 7.2 G/DL (ref 6.4–8.3)
RBC # BLD AUTO: 4.06 10E6/UL (ref 3.8–5.2)
SODIUM SERPL-SCNC: 142 MMOL/L (ref 135–145)
WBC # BLD AUTO: 5.4 10E3/UL (ref 4–11)

## 2025-03-03 PROCEDURE — 85004 AUTOMATED DIFF WBC COUNT: CPT

## 2025-03-03 PROCEDURE — 99213 OFFICE O/P EST LOW 20 MIN: CPT | Performed by: NURSE PRACTITIONER

## 2025-03-03 PROCEDURE — 84155 ASSAY OF PROTEIN SERUM: CPT

## 2025-03-03 PROCEDURE — 82374 ASSAY BLOOD CARBON DIOXIDE: CPT

## 2025-03-03 PROCEDURE — 36415 COLL VENOUS BLD VENIPUNCTURE: CPT

## 2025-03-03 PROCEDURE — 99214 OFFICE O/P EST MOD 30 MIN: CPT | Performed by: NURSE PRACTITIONER

## 2025-03-03 ASSESSMENT — PAIN SCALES - GENERAL: PAINLEVEL_OUTOF10: NO PAIN (0)

## 2025-03-03 NOTE — LETTER
3/3/2025      Antonella Jamison  [address] [facility] [address]      Dear Colleague,    Thank you for referring your patient, Antonella Jamison, to the [facility]. Please see a copy of my visit note below.    Oncology Follow Up Visit: March 4, 2025    Oncologist: Dr MILKA Noel  PCP: Rosemary Mata    Diagnosis: Left breast hormone positive cancer  Antonella Jamison is a 44 year old female who found lump to left breast in 5/2021  -5/2021 ultrasound-guided left breast biopsy of 12:00 lesion, 4 cm from nipple  Invasive ductal carcinoma, measuring 0.7 cm in core biopsy ,[place] grade: I, with DCIS, ER/OH +, HER2 by IHC: Negative (1+ by manual morphometry)   - 6/9/2021-left breast lumpectomy and left sentinel lymph node biopsy.    A) LEFT BREAST, ORIENTED LUMPECTOMY:        1) INVASIVE DUCTAL CARCINOMA             a) Grade: [place]aro grade I (of III)             b) Size:  15 x 11 x 9 mm (measured and calculated in slides)   c) Margins: Uninvolved, but very close to, at 0.8 mm from the nearest   posterior margin, and 4 mm from anterior margin        2) DUCTAL CARCINOMA IN SITU: EIC Negative (SEE COMMENT)             a) Nuclear grade: Low to intermediate             b) Patterns: Cribriform and solid, with focal comedonecrosis             c) Size:  20% of the tumor, and measured up to 18 x 4 mm in   sections away from invasive carcinoma             d) Margins: Positive, at posterior (multifocal) and medial   (unifocal) margins   - 0/2 SLN - no metastasis  PATHOLOGIC STAGE: pT1c, pN0 (sn)  -6/14/2021-underwent reexcision due to close margins (and hematoma evacuation)  - Oncotype DX = 11  -7/28/2021-8/24/2021-adjuvant radiation to the breast 5240 cGy in 20 treatments.  (Dr. Mayer)  -8/2021-initiated tamoxifen    -7/21 [facility] Common hereditary cancers panel, TRAVIS and CHEK2: Negative for mutations in 47 genes tested    Interval History: Ms Jamison comes to clinic for review  "of her breast cancer and use of Tamoxifen since 8/2021- pt now into treatment x 3.5 years. Pt states she is doing well and has no complaints. Some hot flashes noted yet but tolerable.   She is eating well with weight stable.   No new breast/chest changes.   Wonders if in perimenopause. Is good about regular exercise  Rest of comprehensive and complete ROS is reviewed and is negative.   Past Medical History:   Diagnosis Date     Cancer (H)      Infectious viral hepatitis      Personal history of malaria      Current Outpatient Medications   Medication Sig Dispense Refill     acetaminophen (TYLENOL) 325 MG tablet 2 tab(s)       amoxicillin-clavulanate (AUGMENTIN) 875-125 MG tablet Take 1 tablet by mouth 2 times daily.       Cyanocobalamin (RAPID B-12 ENERGY) 200 MCG/SPRAY LIQD Place 1 spray under the tongue       fish oil-omega-3 fatty acids 500 MG capsule        fluconazole (DIFLUCAN) 150 MG tablet        ibuprofen (ADVIL/MOTRIN) 200 MG tablet Take 200-400 mg by mouth       lactobacillus rhamnosus, GG, (CULTURELL) capsule Take 1 capsule by mouth 2 times daily       loratadine (CLARITIN) 10 MG tablet 1 tab(s)       magnesium 250 MG tablet Take 1 tablet by mouth daily       metroNIDAZOLE (FLAGYL) 500 MG tablet        Multiple Vitamin (MULTIVITAMIN ADULT PO) Take 1 tablet by mouth       tamoxifen (NOLVADEX) 20 MG tablet Take 1 tablet (20 mg) by mouth daily. 90 tablet 3     Vitamin D, Cholecalciferol, 25 MCG (1000 UT) CAPS        No current facility-administered medications for this visit.     Allergies   Allergen Reactions     Cat Dander [Animal Dander] Unknown     Dog Dander [Dog Epithelium (Canis Lupus Familiaris)] Unknown     Other Environmental Allergy Unknown     Seasonal Allergies      Other Reaction(s): Unknown       Physical Exam:/69 (BP Location: Right arm)   Pulse 79   Temp 98.8  F (37.1  C) (Oral)   Ht 1.753 m (5' 9.02\")   LMP 02/09/2025   SpO2 99%   BMI 20.66 kg/m     ECOG PS- 0  Constitutional: " Alert, healthy, and in no distress.   ENT: Eyes bright , No mouth sores  Neck: Supple, No adenopathy.  Cardiac: Heart rate and rhythm is regular and strong without murmur  Respiratory: Breathing easy. Lung sounds clear to auscultation  Breasts: bilaterally without new masses or tenderness or discharge.   GI: Abdomen is soft, non-tender, BS normal. No masses or organomegaly  MS: Muscle tone normal, extremities normal with no edema.   Skin: No suspicious lesions or rashes  Neuro: Sensory grossly WNL, gait normal.   Lymph: Normal ant/post cervical, axillary, supraclavicular nodes  Psych: Mentation appears normal and affect normal/bright and smiling    Laboratory/Imaging Results:   Results for orders placed or performed in visit on 03/03/25   Comprehensive metabolic panel     Status: Abnormal   Result Value Ref Range    Sodium 142 135 - 145 mmol/L    Potassium 4.2 3.4 - 5.3 mmol/L    Carbon Dioxide (CO2) 25 22 - 29 mmol/L    Anion Gap 11 7 - 15 mmol/L    Urea Nitrogen 18.1 6.0 - 20.0 mg/dL    Creatinine 0.79 0.51 - 0.95 mg/dL    GFR Estimate >90 >60 mL/min/1.73m2    Calcium 9.5 8.8 - 10.4 mg/dL    Chloride 106 98 - 107 mmol/L    Glucose 60 (L) 70 - 99 mg/dL    Alkaline Phosphatase 42 40 - 150 U/L    AST 21 0 - 45 U/L    ALT 18 0 - 50 U/L    Protein Total 7.2 6.4 - 8.3 g/dL    Albumin 4.1 3.5 - 5.2 g/dL    Bilirubin Total 0.3 <=1.2 mg/dL   Extra Tube     Status: None    Narrative    The following orders were created for panel order Extra Tube.  Procedure                               Abnormality         Status                     ---------                               -----------         ------                     Extra Serum Separator Tu...[[id number]]                      Final result                 Please view results for these tests on the individual orders.   CBC with platelets and differential     Status: None   Result Value Ref Range    WBC Count 5.4 4.0 - 11.0 10e3/uL    RBC Count 4.06 3.80 - 5.20 10e6/uL     Hemoglobin 12.4 11.7 - 15.7 g/dL    Hematocrit 37.2 35.0 - 47.0 %    MCV 92 78 - 100 fL    MCH 30.5 26.5 - 33.0 pg    MCHC 33.3 31.5 - 36.5 g/dL    RDW 12.1 10.0 - 15.0 %    Platelet Count 287 150 - 450 10e3/uL    % Neutrophils 51 %    % Lymphocytes 28 %    % Monocytes 11 %    % Eosinophils 8 %    % Basophils 1 %    % Immature Granulocytes 0 %    NRBCs per 100 WBC 0 <1 /100    Absolute Neutrophils 2.8 1.6 - 8.3 10e3/uL    Absolute Lymphocytes 1.5 0.8 - 5.3 10e3/uL    Absolute Monocytes 0.6 0.0 - 1.3 10e3/uL    Absolute Eosinophils 0.5 0.0 - 0.7 10e3/uL    Absolute Basophils 0.1 0.0 - 0.2 10e3/uL    Absolute Immature Granulocytes 0.0 <=0.4 10e3/uL    Absolute NRBCs 0.0 10e3/uL   Extra Serum Separator Tube (SST)     Status: None   Result Value Ref Range    Hold Specimen [facility]    CBC with Platelets & Differential     Status: None    Narrative    The following orders were created for panel order CBC with Platelets & Differential.  Procedure                               Abnormality         Status                     ---------                               -----------         ------                     CBC with platelets and d...[[id number]]                      Final result                 Please view results for these tests on the individual orders.       Assessment and Plan:   Left breast hormone positive cancer- Pt completed lumpectomy with SLN bx , repeat margin excision, radiation therapy and then began Tamoxifen use by 8/2021. She continues with Tamoxifen and is tolerating well.   Planning on 5 years of use.   Labs reviewed as acceptable.   Last mammogram was 6/24/2024- repeat annually- order given  Reminded of need for annual uterine evaluation due to Tamoxifen use.   Return to clinic for review in 6 months with no labs necessary.       The total time of this encounter amounted to  30 minutes. This time included face to face time spent with the patient, prep work, ordering tests, and performing post visit  documentation.  Bambi Gonzalez Cnp      Again, thank you for allowing me to participate in the care of your patient.        Sincerely,        Bambi Gonzalez NP, APRN CNP    Electronically signed

## 2025-03-03 NOTE — NURSING NOTE
"Oncology Rooming Note    March 3, 2025 10:37 AM   Antonella Jamison is a 44 year old female who presents for:    Chief Complaint   Patient presents with    Oncology Clinic Visit     Initial Vitals: /69 (BP Location: Right arm)   Pulse 79   Temp 98.8  F (37.1  C) (Oral)   Ht 1.753 m (5' 9.02\")   LMP 02/09/2025   SpO2 99%   BMI 20.66 kg/m   Estimated body mass index is 20.66 kg/m  as calculated from the following:    Height as of this encounter: 1.753 m (5' 9.02\").    Weight as of 8/29/24: 63.5 kg (140 lb). Body surface area is 1.76 meters squared.  No Pain (0) Comment: Data Unavailable   Patient's last menstrual period was 02/09/2025.  Allergies reviewed: Yes  Medications reviewed: No    Medications: Medication refills not needed today.  Pharmacy name entered into EPIC: [id number] - [place], [place] - [facility] [address]    Frailty Screening:   Is the patient here for a new oncology consult visit in cancer care? 2. No    PHQ9:  Did this patient require a PHQ9?: No      Clinical concerns: Patient will discuss concerns with provider.       Humble Rose MA            "

## 2025-06-26 ENCOUNTER — ANCILLARY PROCEDURE (OUTPATIENT)
Dept: MAMMOGRAPHY | Facility: CLINIC | Age: 45
End: 2025-06-26
Attending: NURSE PRACTITIONER
Payer: COMMERCIAL

## 2025-06-26 DIAGNOSIS — Z17.0 MALIGNANT NEOPLASM OF CENTRAL PORTION OF LEFT BREAST IN FEMALE, ESTROGEN RECEPTOR POSITIVE (H): ICD-10-CM

## 2025-06-26 DIAGNOSIS — Z12.31 ENCOUNTER FOR SCREENING MAMMOGRAM FOR BREAST CANCER: ICD-10-CM

## 2025-06-26 DIAGNOSIS — C50.112 MALIGNANT NEOPLASM OF CENTRAL PORTION OF LEFT BREAST IN FEMALE, ESTROGEN RECEPTOR POSITIVE (H): ICD-10-CM

## 2025-06-26 PROCEDURE — 77063 BREAST TOMOSYNTHESIS BI: CPT | Mod: GC | Performed by: STUDENT IN AN ORGANIZED HEALTH CARE EDUCATION/TRAINING PROGRAM

## 2025-06-26 PROCEDURE — 77067 SCR MAMMO BI INCL CAD: CPT | Mod: GC | Performed by: STUDENT IN AN ORGANIZED HEALTH CARE EDUCATION/TRAINING PROGRAM

## 2025-09-02 ENCOUNTER — ONCOLOGY VISIT (OUTPATIENT)
Dept: ONCOLOGY | Facility: CLINIC | Age: 45
End: 2025-09-02
Attending: NURSE PRACTITIONER
Payer: COMMERCIAL

## 2025-09-02 VITALS
SYSTOLIC BLOOD PRESSURE: 118 MMHG | HEART RATE: 82 BPM | DIASTOLIC BLOOD PRESSURE: 78 MMHG | WEIGHT: 145.3 LBS | HEIGHT: 69 IN | BODY MASS INDEX: 21.52 KG/M2 | TEMPERATURE: 98.6 F | OXYGEN SATURATION: 99 %

## 2025-09-02 DIAGNOSIS — Z17.0 MALIGNANT NEOPLASM OF CENTRAL PORTION OF LEFT BREAST IN FEMALE, ESTROGEN RECEPTOR POSITIVE (H): Primary | ICD-10-CM

## 2025-09-02 DIAGNOSIS — Z12.31 ENCOUNTER FOR SCREENING MAMMOGRAM FOR BREAST CANCER: ICD-10-CM

## 2025-09-02 DIAGNOSIS — Z79.810 LONG-TERM CURRENT USE OF TAMOXIFEN: ICD-10-CM

## 2025-09-02 DIAGNOSIS — C50.112 MALIGNANT NEOPLASM OF CENTRAL PORTION OF LEFT BREAST IN FEMALE, ESTROGEN RECEPTOR POSITIVE (H): Primary | ICD-10-CM

## 2025-09-02 PROCEDURE — 99214 OFFICE O/P EST MOD 30 MIN: CPT | Performed by: PHYSICIAN ASSISTANT

## 2025-09-02 PROCEDURE — G2211 COMPLEX E/M VISIT ADD ON: HCPCS | Performed by: PHYSICIAN ASSISTANT

## 2025-09-02 PROCEDURE — 99213 OFFICE O/P EST LOW 20 MIN: CPT | Performed by: PHYSICIAN ASSISTANT

## 2025-09-02 RX ORDER — TAMOXIFEN CITRATE 20 MG/1
20 TABLET ORAL DAILY
Qty: 90 TABLET | Refills: 3 | Status: SHIPPED | OUTPATIENT
Start: 2025-09-02

## 2025-09-02 ASSESSMENT — PAIN SCALES - GENERAL: PAINLEVEL_OUTOF10: NO PAIN (0)

## (undated) DEVICE — SOL WATER IRRIG 1000ML BOTTLE 07139-09

## (undated) DEVICE — LIFTER SURGICAL ASCENDO SUBMUCOSAL LIFT AGENT BX00712934

## (undated) RX ORDER — FENTANYL CITRATE 50 UG/ML
INJECTION, SOLUTION INTRAMUSCULAR; INTRAVENOUS
Status: DISPENSED
Start: 2025-01-31

## (undated) RX ORDER — SIMETHICONE 40MG/0.6ML
SUSPENSION, DROPS(FINAL DOSAGE FORM)(ML) ORAL
Status: DISPENSED
Start: 2025-01-31